# Patient Record
Sex: MALE | Race: WHITE | NOT HISPANIC OR LATINO
[De-identification: names, ages, dates, MRNs, and addresses within clinical notes are randomized per-mention and may not be internally consistent; named-entity substitution may affect disease eponyms.]

---

## 2017-10-09 ENCOUNTER — RESULT REVIEW (OUTPATIENT)
Age: 57
End: 2017-10-09

## 2017-10-18 ENCOUNTER — OUTPATIENT (OUTPATIENT)
Dept: OUTPATIENT SERVICES | Facility: HOSPITAL | Age: 57
LOS: 1 days | End: 2017-10-18
Payer: COMMERCIAL

## 2017-10-18 ENCOUNTER — APPOINTMENT (OUTPATIENT)
Dept: MRI IMAGING | Facility: CLINIC | Age: 57
End: 2017-10-18

## 2017-10-18 DIAGNOSIS — Z00.8 ENCOUNTER FOR OTHER GENERAL EXAMINATION: ICD-10-CM

## 2017-10-18 PROCEDURE — 72148 MRI LUMBAR SPINE W/O DYE: CPT | Mod: 26

## 2017-10-18 PROCEDURE — 72148 MRI LUMBAR SPINE W/O DYE: CPT

## 2018-04-12 ENCOUNTER — OUTPATIENT (OUTPATIENT)
Dept: OUTPATIENT SERVICES | Facility: HOSPITAL | Age: 58
LOS: 1 days | End: 2018-04-12
Payer: COMMERCIAL

## 2018-04-12 ENCOUNTER — APPOINTMENT (OUTPATIENT)
Dept: MRI IMAGING | Facility: CLINIC | Age: 58
End: 2018-04-12

## 2018-04-12 DIAGNOSIS — Z00.8 ENCOUNTER FOR OTHER GENERAL EXAMINATION: ICD-10-CM

## 2018-04-12 PROCEDURE — 73721 MRI JNT OF LWR EXTRE W/O DYE: CPT | Mod: 26,RT

## 2018-04-12 PROCEDURE — 73721 MRI JNT OF LWR EXTRE W/O DYE: CPT

## 2018-05-22 ENCOUNTER — OUTPATIENT (OUTPATIENT)
Dept: OUTPATIENT SERVICES | Facility: HOSPITAL | Age: 58
LOS: 1 days | End: 2018-05-22
Payer: COMMERCIAL

## 2018-05-22 VITALS
HEIGHT: 67 IN | WEIGHT: 173.94 LBS | SYSTOLIC BLOOD PRESSURE: 105 MMHG | HEART RATE: 65 BPM | RESPIRATION RATE: 16 BRPM | TEMPERATURE: 97 F | DIASTOLIC BLOOD PRESSURE: 74 MMHG

## 2018-05-22 DIAGNOSIS — S83.241A OTHER TEAR OF MEDIAL MENISCUS, CURRENT INJURY, RIGHT KNEE, INITIAL ENCOUNTER: ICD-10-CM

## 2018-05-22 DIAGNOSIS — Z01.818 ENCOUNTER FOR OTHER PREPROCEDURAL EXAMINATION: ICD-10-CM

## 2018-05-22 DIAGNOSIS — Z98.890 OTHER SPECIFIED POSTPROCEDURAL STATES: Chronic | ICD-10-CM

## 2018-05-22 LAB
ANION GAP SERPL CALC-SCNC: 8 MMOL/L — SIGNIFICANT CHANGE UP (ref 5–17)
BUN SERPL-MCNC: 22 MG/DL — SIGNIFICANT CHANGE UP (ref 7–23)
CALCIUM SERPL-MCNC: 9.1 MG/DL — SIGNIFICANT CHANGE UP (ref 8.5–10.1)
CHLORIDE SERPL-SCNC: 106 MMOL/L — SIGNIFICANT CHANGE UP (ref 96–108)
CO2 SERPL-SCNC: 27 MMOL/L — SIGNIFICANT CHANGE UP (ref 22–31)
CREAT SERPL-MCNC: 1 MG/DL — SIGNIFICANT CHANGE UP (ref 0.5–1.3)
GLUCOSE SERPL-MCNC: 81 MG/DL — SIGNIFICANT CHANGE UP (ref 70–99)
HCT VFR BLD CALC: 45 % — SIGNIFICANT CHANGE UP (ref 39–50)
HGB BLD-MCNC: 15 G/DL — SIGNIFICANT CHANGE UP (ref 13–17)
MCHC RBC-ENTMCNC: 31.6 PG — SIGNIFICANT CHANGE UP (ref 27–34)
MCHC RBC-ENTMCNC: 33.2 GM/DL — SIGNIFICANT CHANGE UP (ref 32–36)
MCV RBC AUTO: 95.1 FL — SIGNIFICANT CHANGE UP (ref 80–100)
PLATELET # BLD AUTO: 271 K/UL — SIGNIFICANT CHANGE UP (ref 150–400)
POTASSIUM SERPL-MCNC: 4.1 MMOL/L — SIGNIFICANT CHANGE UP (ref 3.5–5.3)
POTASSIUM SERPL-SCNC: 4.1 MMOL/L — SIGNIFICANT CHANGE UP (ref 3.5–5.3)
RBC # BLD: 4.74 M/UL — SIGNIFICANT CHANGE UP (ref 4.2–5.8)
RBC # FLD: 11 % — SIGNIFICANT CHANGE UP (ref 10.3–14.5)
SODIUM SERPL-SCNC: 141 MMOL/L — SIGNIFICANT CHANGE UP (ref 135–145)
WBC # BLD: 6.7 K/UL — SIGNIFICANT CHANGE UP (ref 3.8–10.5)
WBC # FLD AUTO: 6.7 K/UL — SIGNIFICANT CHANGE UP (ref 3.8–10.5)

## 2018-05-22 PROCEDURE — 93005 ELECTROCARDIOGRAM TRACING: CPT

## 2018-05-22 PROCEDURE — G0463: CPT

## 2018-05-22 PROCEDURE — 85027 COMPLETE CBC AUTOMATED: CPT

## 2018-05-22 PROCEDURE — 93010 ELECTROCARDIOGRAM REPORT: CPT | Mod: NC

## 2018-05-22 PROCEDURE — 80048 BASIC METABOLIC PNL TOTAL CA: CPT

## 2018-05-22 NOTE — H&P PST ADULT - PMH
Chronic pain of right knee    Gastroesophageal reflux disease without esophagitis    Other tear of medial meniscus, current injury, right knee, initial encounter    Seasonal allergic rhinitis, unspecified trigger

## 2018-05-22 NOTE — H&P PST ADULT - NSANTHOSAYNRD_GEN_A_CORE
No. PIO screening performed.  STOP BANG Legend: 0-2 = LOW Risk; 3-4 = INTERMEDIATE Risk; 5-8 = HIGH Risk

## 2018-05-22 NOTE — H&P PST ADULT - PROBLEM SELECTOR PLAN 2
Labs - CBC, BMP and EKG  MC with Dr. Fuchs, as requested by Dr. Tamayo  Pre op and Hibiclens instructions reviewed and given. Take routine am meds DOS with sip of water. Avoid NSAIDs and OTC supplements. Verbalized understanding

## 2018-05-22 NOTE — H&P PST ADULT - HISTORY OF PRESENT ILLNESS
59 yo male presents to [facility] scheduled for right knee arthroscopy - partial medial meniscectomy - chondroplasty on 5/30 with Dr. Tamayo. Reports a 2 year history of right knee pain that has increased. MRI show a medial meniscus tear. Denies pain and swelling today.

## 2018-05-22 NOTE — H&P PST ADULT - PROBLEM SELECTOR PLAN 1
scheduled for right knee arthroscopy - partial medial meniscectomy - chondroplasty on 5/30 with Dr. Tamayo

## 2018-05-22 NOTE — H&P PST ADULT - ASSESSMENT
59 yo make with a right knee medial meniscus tear scheduled for right knee arthroscopy - partial medial meniscectomy - chondroplasty on 5/30 with Dr. Tamayo

## 2018-05-29 ENCOUNTER — TRANSCRIPTION ENCOUNTER (OUTPATIENT)
Age: 58
End: 2018-05-29

## 2018-05-29 RX ORDER — SODIUM CHLORIDE 9 MG/ML
1000 INJECTION, SOLUTION INTRAVENOUS
Qty: 0 | Refills: 0 | Status: DISCONTINUED | OUTPATIENT
Start: 2018-05-30 | End: 2018-05-30

## 2018-05-30 ENCOUNTER — RESULT REVIEW (OUTPATIENT)
Age: 58
End: 2018-05-30

## 2018-05-30 ENCOUNTER — OUTPATIENT (OUTPATIENT)
Dept: OUTPATIENT SERVICES | Facility: HOSPITAL | Age: 58
LOS: 1 days | End: 2018-05-30
Payer: COMMERCIAL

## 2018-05-30 VITALS
SYSTOLIC BLOOD PRESSURE: 126 MMHG | RESPIRATION RATE: 14 BRPM | DIASTOLIC BLOOD PRESSURE: 85 MMHG | TEMPERATURE: 98 F | HEART RATE: 66 BPM | OXYGEN SATURATION: 93 % | HEIGHT: 67 IN | WEIGHT: 173.94 LBS

## 2018-05-30 VITALS
TEMPERATURE: 97 F | HEART RATE: 64 BPM | DIASTOLIC BLOOD PRESSURE: 82 MMHG | OXYGEN SATURATION: 97 % | SYSTOLIC BLOOD PRESSURE: 122 MMHG | RESPIRATION RATE: 13 BRPM

## 2018-05-30 DIAGNOSIS — Z01.818 ENCOUNTER FOR OTHER PREPROCEDURAL EXAMINATION: ICD-10-CM

## 2018-05-30 DIAGNOSIS — S83.241A OTHER TEAR OF MEDIAL MENISCUS, CURRENT INJURY, RIGHT KNEE, INITIAL ENCOUNTER: ICD-10-CM

## 2018-05-30 DIAGNOSIS — Z98.890 OTHER SPECIFIED POSTPROCEDURAL STATES: Chronic | ICD-10-CM

## 2018-05-30 PROCEDURE — 88304 TISSUE EXAM BY PATHOLOGIST: CPT | Mod: 26

## 2018-05-30 PROCEDURE — 29881 ARTHRS KNE SRG MNISECTMY M/L: CPT | Mod: RT

## 2018-05-30 PROCEDURE — 88304 TISSUE EXAM BY PATHOLOGIST: CPT

## 2018-05-30 RX ORDER — ASPIRIN/CALCIUM CARB/MAGNESIUM 324 MG
1 TABLET ORAL
Qty: 28 | Refills: 0 | OUTPATIENT
Start: 2018-05-30 | End: 2018-06-12

## 2018-05-30 RX ORDER — SODIUM CHLORIDE 9 MG/ML
1000 INJECTION, SOLUTION INTRAVENOUS
Qty: 0 | Refills: 0 | Status: DISCONTINUED | OUTPATIENT
Start: 2018-05-30 | End: 2018-05-30

## 2018-05-30 RX ORDER — CEFAZOLIN SODIUM 1 G
2000 VIAL (EA) INJECTION ONCE
Qty: 0 | Refills: 0 | Status: COMPLETED | OUTPATIENT
Start: 2018-05-30 | End: 2018-05-30

## 2018-05-30 RX ORDER — ASPIRIN/CALCIUM CARB/MAGNESIUM 324 MG
1 TABLET ORAL
Qty: 0 | Refills: 0 | DISCHARGE
Start: 2018-05-30 | End: 2018-06-12

## 2018-05-30 RX ORDER — HYDROMORPHONE HYDROCHLORIDE 2 MG/ML
0.5 INJECTION INTRAMUSCULAR; INTRAVENOUS; SUBCUTANEOUS
Qty: 0 | Refills: 0 | Status: DISCONTINUED | OUTPATIENT
Start: 2018-05-30 | End: 2018-05-30

## 2018-05-30 RX ORDER — ONDANSETRON 8 MG/1
4 TABLET, FILM COATED ORAL ONCE
Qty: 0 | Refills: 0 | Status: DISCONTINUED | OUTPATIENT
Start: 2018-05-30 | End: 2018-05-30

## 2018-05-30 RX ORDER — ASPIRIN/CALCIUM CARB/MAGNESIUM 324 MG
1 TABLET ORAL
Qty: 28 | Refills: 0
Start: 2018-05-30 | End: 2018-06-12

## 2018-05-30 RX ADMIN — SODIUM CHLORIDE 50 MILLILITER(S): 9 INJECTION, SOLUTION INTRAVENOUS at 06:32

## 2018-05-30 RX ADMIN — SODIUM CHLORIDE 100 MILLILITER(S): 9 INJECTION, SOLUTION INTRAVENOUS at 09:11

## 2018-05-30 NOTE — ASU DISCHARGE PLAN (ADULT/PEDIATRIC). - MEDICATION SUMMARY - MEDICATIONS TO TAKE
I will START or STAY ON the medications listed below when I get home from the hospital:    aspirin 81 mg oral tablet  -- 1 tab(s) by mouth 2 times a day   -- Take with food or milk.    -- Indication: For blood thinner    Percocet 5/325 oral tablet  -- 1 tab(s) by mouth every 6 hours, As Needed -for severe pain MDD:4   -- Caution federal law prohibits the transfer of this drug to any person other  than the person for whom it was prescribed.  May cause drowsiness.  Alcohol may intensify this effect.  Use care when operating dangerous machinery.  This prescription cannot be refilled.  This product contains acetaminophen.  Do not use  with any other product containing acetaminophen to prevent possible liver damage.  Using more of this medication than prescribed may cause serious breathing problems.    -- Indication: For pain medication    Claritin 5 mg oral tablet, chewable  -- 1 tab(s) by mouth 2 times a day, As Needed  -- Indication: For home medication    omeprazole 20 mg oral delayed release capsule  -- 1 cap(s) by mouth once a day  -- Indication: For home medication    Clarinex-D 12 Hour oral tablet, extended release  -- 1 tab(s) by mouth every 12 hours, As Needed  -- Indication: For home medication

## 2018-05-30 NOTE — ASU DISCHARGE PLAN (ADULT/PEDIATRIC). - ACTIVITY LEVEL
no heavy lifting/weight bearing as tolerated/no sports/gym/weight bearing as tolerated for right leg/no exercise/elevate extremity

## 2018-05-30 NOTE — BRIEF OPERATIVE NOTE - PROCEDURE
<<-----Click on this checkbox to enter Procedure Partial medial meniscectomy of right knee  05/30/2018    Active  BEN

## 2018-05-30 NOTE — ASU DISCHARGE PLAN (ADULT/PEDIATRIC). - NOTIFY
Numbness, color, or temperature change to extremity/Fever greater than 101/Bleeding that does not stop/Unable to Urinate/Pain not relieved by Medications/Swelling that continues

## 2018-07-07 ENCOUNTER — TRANSCRIPTION ENCOUNTER (OUTPATIENT)
Age: 58
End: 2018-07-07

## 2019-01-15 PROBLEM — K21.9 GASTRO-ESOPHAGEAL REFLUX DISEASE WITHOUT ESOPHAGITIS: Chronic | Status: ACTIVE | Noted: 2018-05-22

## 2019-01-15 PROBLEM — M25.561 PAIN IN RIGHT KNEE: Chronic | Status: ACTIVE | Noted: 2018-05-22

## 2019-01-15 PROBLEM — J30.2 OTHER SEASONAL ALLERGIC RHINITIS: Chronic | Status: ACTIVE | Noted: 2018-05-22

## 2019-01-15 PROBLEM — S83.241A OTHER TEAR OF MEDIAL MENISCUS, CURRENT INJURY, RIGHT KNEE, INITIAL ENCOUNTER: Chronic | Status: ACTIVE | Noted: 2018-05-22

## 2019-02-01 ENCOUNTER — OUTPATIENT (OUTPATIENT)
Dept: OUTPATIENT SERVICES | Facility: HOSPITAL | Age: 59
LOS: 1 days | End: 2019-02-01
Payer: COMMERCIAL

## 2019-02-01 ENCOUNTER — APPOINTMENT (OUTPATIENT)
Dept: MRI IMAGING | Facility: CLINIC | Age: 59
End: 2019-02-01
Payer: COMMERCIAL

## 2019-02-01 DIAGNOSIS — Z00.8 ENCOUNTER FOR OTHER GENERAL EXAMINATION: ICD-10-CM

## 2019-02-01 DIAGNOSIS — Z98.890 OTHER SPECIFIED POSTPROCEDURAL STATES: Chronic | ICD-10-CM

## 2019-02-01 PROCEDURE — 73221 MRI JOINT UPR EXTREM W/O DYE: CPT | Mod: 26,RT

## 2019-02-01 PROCEDURE — 73221 MRI JOINT UPR EXTREM W/O DYE: CPT

## 2019-11-22 ENCOUNTER — OUTPATIENT (OUTPATIENT)
Dept: OUTPATIENT SERVICES | Facility: HOSPITAL | Age: 59
LOS: 1 days | End: 2019-11-22
Payer: COMMERCIAL

## 2019-11-22 ENCOUNTER — APPOINTMENT (OUTPATIENT)
Dept: MRI IMAGING | Facility: CLINIC | Age: 59
End: 2019-11-22
Payer: COMMERCIAL

## 2019-11-22 DIAGNOSIS — Z98.890 OTHER SPECIFIED POSTPROCEDURAL STATES: Chronic | ICD-10-CM

## 2019-11-22 DIAGNOSIS — Z00.8 ENCOUNTER FOR OTHER GENERAL EXAMINATION: ICD-10-CM

## 2019-11-22 PROCEDURE — 73221 MRI JOINT UPR EXTREM W/O DYE: CPT

## 2019-11-22 PROCEDURE — 73221 MRI JOINT UPR EXTREM W/O DYE: CPT | Mod: 26,RT

## 2020-07-09 ENCOUNTER — TRANSCRIPTION ENCOUNTER (OUTPATIENT)
Age: 60
End: 2020-07-09

## 2020-07-21 ENCOUNTER — APPOINTMENT (OUTPATIENT)
Dept: PULMONOLOGY | Facility: CLINIC | Age: 60
End: 2020-07-21
Payer: COMMERCIAL

## 2020-07-21 VITALS
HEART RATE: 81 BPM | SYSTOLIC BLOOD PRESSURE: 133 MMHG | OXYGEN SATURATION: 91 % | DIASTOLIC BLOOD PRESSURE: 91 MMHG | TEMPERATURE: 94.8 F

## 2020-07-21 VITALS — OXYGEN SATURATION: 94 %

## 2020-07-21 LAB — POCT - HEMOGLOBIN (HGB), QUANTITATIVE, TRANSCUTANEOUS: 14.2

## 2020-07-21 PROCEDURE — 95012 NITRIC OXIDE EXP GAS DETER: CPT

## 2020-07-21 PROCEDURE — 99204 OFFICE O/P NEW MOD 45 MIN: CPT | Mod: 25

## 2020-07-21 PROCEDURE — 94060 EVALUATION OF WHEEZING: CPT

## 2020-07-21 PROCEDURE — 71046 X-RAY EXAM CHEST 2 VIEWS: CPT

## 2020-07-21 PROCEDURE — 94729 DIFFUSING CAPACITY: CPT

## 2020-07-21 PROCEDURE — 94727 GAS DIL/WSHOT DETER LNG VOL: CPT

## 2020-07-21 PROCEDURE — ZZZZZ: CPT

## 2020-07-21 PROCEDURE — 88738 HGB QUANT TRANSCUTANEOUS: CPT

## 2020-07-21 NOTE — ASSESSMENT
[FreeTextEntry1] : prednisone taper\par albuterol HFA\par symbicort BID\par repeat jeromy in 1 month\par f/u rvp\par PPI\par allegra\par flonase \par \par

## 2020-07-21 NOTE — PROCEDURE
[FreeTextEntry1] : PA and lateral chest xray performed using standard projections. Bones and soft tissues structures are unremarkable.Cardiac silhouette appears normal. Lung fields are clear. No infiltrate or masses noted. Mediastinal contours are normal.\par IMPRESSION: no acute cardiopulmonary disease\par \par \par pfts-mild obstructive disease with bronchodiator response at midflows\par increase volume sugestive of air trapping \par \par \par covid swab negative\par he had routine labs drawn today\par \par rvp today

## 2020-07-21 NOTE — HISTORY OF PRESENT ILLNESS
[Never] : never [TextBox_4] : CHRIST WHITE is a 60 year old male who presents with cough/ wheezing X 3- 4weeks\par he has seasonal allergies & post nasal drip\par noticed himself wheezing at night- progressed to throughout the day\par + seasonal allergies\par + post nasal drip\par no new pets/ allergens\par no GI symptoms\par \par \par saw hi PCP- Dr. Kincaid\par started on albuterol HFA- some improvement in symptoms\par no sick contact, no previous pfts\par never seen pulm\par covid swab negative\par covid antibody negative\par \par never smoker\par nonsmoker\par works in data collection\par  \par

## 2020-07-21 NOTE — PHYSICAL EXAM
[No Acute Distress] : no acute distress [No Deformities] : no deformities [IV] : Mallampati Class: IV [Normal Appearance] : normal appearance [Normal Rate/Rhythm] : normal rate/rhythm [No Murmurs] : no murmurs [Normal S1, S2] : normal s1, s2 [No Acc Muscle Use] : no acc muscle use [No Focal Deficits] : no focal deficits [Oriented x3] : oriented x3 [Normal Affect] : normal affect [TextBox_68] : wheezing b/l   [TextBox_11] : +post nasal drip

## 2020-07-22 LAB — RAPID RVP RESULT: NOT DETECTED

## 2020-08-19 ENCOUNTER — LABORATORY RESULT (OUTPATIENT)
Age: 60
End: 2020-08-19

## 2020-08-19 ENCOUNTER — APPOINTMENT (OUTPATIENT)
Dept: PULMONOLOGY | Facility: CLINIC | Age: 60
End: 2020-08-19
Payer: COMMERCIAL

## 2020-08-19 VITALS
SYSTOLIC BLOOD PRESSURE: 133 MMHG | DIASTOLIC BLOOD PRESSURE: 85 MMHG | TEMPERATURE: 97.9 F | HEART RATE: 82 BPM | RESPIRATION RATE: 16 BRPM | OXYGEN SATURATION: 93 %

## 2020-08-19 PROCEDURE — 99213 OFFICE O/P EST LOW 20 MIN: CPT | Mod: 25

## 2020-08-19 PROCEDURE — 36415 COLL VENOUS BLD VENIPUNCTURE: CPT

## 2020-08-19 NOTE — HISTORY OF PRESENT ILLNESS
[Never] : never [TextBox_4] : CHRISTINE WHITE is a 60 year old male who presents for another exacerbation\par after prednisone taper \par okay for 1 week\par then back with cough, sometimes productive- yellow sputum. chest tight. wheezing\par he is on symbicort , flonase and clairitin\par no fevers, no chills\par no change in exercise tolerance\par \par

## 2020-08-19 NOTE — PHYSICAL EXAM
[No Acute Distress] : no acute distress [No Deformities] : no deformities [No Neck Mass] : no neck mass [III] : Mallampati Class: III [No Murmurs] : no murmurs [Normal Rate/Rhythm] : normal rate/rhythm [Normal S1, S2] : normal s1, s2 [No Focal Deficits] : no focal deficits [Oriented x3] : oriented x3 [Normal Affect] : normal affect [TextBox_68] : wheeze expiratory Left. right lung CTA

## 2020-08-19 NOTE — ASSESSMENT
[FreeTextEntry1] : prednisone taper again\par continue PPI\par continue flonase\par symbicort samples given\par stop claritin\par start singulair\par hold on xray\par hold on abx\par f/u labs\par repeat jeromy soon\par

## 2020-08-21 ENCOUNTER — NON-APPOINTMENT (OUTPATIENT)
Age: 60
End: 2020-08-21

## 2020-09-02 ENCOUNTER — APPOINTMENT (OUTPATIENT)
Dept: PULMONOLOGY | Facility: CLINIC | Age: 60
End: 2020-09-02
Payer: COMMERCIAL

## 2020-09-02 VITALS
DIASTOLIC BLOOD PRESSURE: 82 MMHG | SYSTOLIC BLOOD PRESSURE: 123 MMHG | RESPIRATION RATE: 15 BRPM | HEART RATE: 89 BPM | TEMPERATURE: 97.8 F | OXYGEN SATURATION: 95 %

## 2020-09-02 PROCEDURE — 99213 OFFICE O/P EST LOW 20 MIN: CPT

## 2020-09-02 RX ORDER — FLUTICASONE PROPIONATE 50 UG/1
50 SPRAY, METERED NASAL TWICE DAILY
Qty: 1 | Refills: 3 | Status: DISCONTINUED | COMMUNITY
Start: 2020-07-21 | End: 2020-09-02

## 2020-09-02 NOTE — DISCUSSION/SUMMARY
[FreeTextEntry1] : continue symbicort\par continue singulair\par start atrovent nasal spray\par stop flonase nasal spray\par finished prednisone\par dneies GERD symptoms- can stop PPI\par call me in 2 weeks\par if post nasal drip still an issue- can go to ENT\par \par

## 2020-09-02 NOTE — HISTORY OF PRESENT ILLNESS
[Never] : never [TextBox_4] : CHRISTINE WHITE is a 60 year old male who presents for f/u for cough/ wheezing \par he is finished with prednisone- no longer wheezing\par \par on symbicort, flonase & singulair. no wheezing.\par + post nasal drip still ongoing and it causes a cough\par no change in exercise tolerance\par no fevers\par no GERD\par no sinus congestion/pain\par + history of deviated septum\par

## 2020-09-02 NOTE — PHYSICAL EXAM
[No Acute Distress] : no acute distress [III] : Mallampati Class: III [No Deformities] : no deformities [No Neck Mass] : no neck mass [Normal Rate/Rhythm] : normal rate/rhythm [Normal S1, S2] : normal s1, s2 [No Murmurs] : no murmurs [No Resp Distress] : no resp distress [Clear to Auscultation Bilaterally] : clear to auscultation bilaterally [No Focal Deficits] : no focal deficits [Oriented x3] : oriented x3 [Normal Affect] : normal affect

## 2020-09-08 ENCOUNTER — NON-APPOINTMENT (OUTPATIENT)
Age: 60
End: 2020-09-08

## 2020-09-10 ENCOUNTER — RESULT REVIEW (OUTPATIENT)
Age: 60
End: 2020-09-10

## 2020-09-10 ENCOUNTER — APPOINTMENT (OUTPATIENT)
Dept: CT IMAGING | Facility: IMAGING CENTER | Age: 60
End: 2020-09-10
Payer: COMMERCIAL

## 2020-09-10 ENCOUNTER — OUTPATIENT (OUTPATIENT)
Dept: OUTPATIENT SERVICES | Facility: HOSPITAL | Age: 60
LOS: 1 days | End: 2020-09-10
Payer: COMMERCIAL

## 2020-09-10 DIAGNOSIS — R06.2 WHEEZING: ICD-10-CM

## 2020-09-10 DIAGNOSIS — Z98.890 OTHER SPECIFIED POSTPROCEDURAL STATES: Chronic | ICD-10-CM

## 2020-09-10 DIAGNOSIS — Z00.8 ENCOUNTER FOR OTHER GENERAL EXAMINATION: ICD-10-CM

## 2020-09-10 PROCEDURE — 71250 CT THORAX DX C-: CPT | Mod: 26

## 2020-09-10 PROCEDURE — 71250 CT THORAX DX C-: CPT

## 2020-09-14 ENCOUNTER — APPOINTMENT (OUTPATIENT)
Dept: PULMONOLOGY | Facility: CLINIC | Age: 60
End: 2020-09-14
Payer: COMMERCIAL

## 2020-09-14 ENCOUNTER — LABORATORY RESULT (OUTPATIENT)
Age: 60
End: 2020-09-14

## 2020-09-14 DIAGNOSIS — Z23 ENCOUNTER FOR IMMUNIZATION: ICD-10-CM

## 2020-09-14 PROCEDURE — 36415 COLL VENOUS BLD VENIPUNCTURE: CPT

## 2020-09-14 PROCEDURE — 90686 IIV4 VACC NO PRSV 0.5 ML IM: CPT

## 2020-09-14 PROCEDURE — G0008: CPT

## 2020-09-14 PROCEDURE — 99214 OFFICE O/P EST MOD 30 MIN: CPT | Mod: 25

## 2020-09-14 NOTE — HISTORY OF PRESENT ILLNESS
[Never] : never [TextBox_4] : CHRIST WHITE is a 60 year old male who presents wih wife\par wheezing/ dyspnea again. \par cough +\par on symbicort and started spiriva for past week- no change\par he is off PPI but regardless of whether on/off wheezing is present\par we went over his allergy serum profile\par went over high eosinophils on CBC\par denies any post nasal drip\par CT chest noncontrast 4 mm nodules (2) in nonsmoker\par \par

## 2020-09-14 NOTE — ASSESSMENT
[FreeTextEntry1] : he has been on 2 prednisone tapers- stable for 2 days and then return of cough/ wheeze\par his eosinophils were 12% when off prednisone\par will ask him to see Dr Boxer\par will perform pfts to see if any reversibility\par check FENO\par check cbc again today off prednisone\par may be candidate for injectables\par he wants to try trelgy- sample given- advised to stop symbicort spiriva

## 2020-09-14 NOTE — PROCEDURE
[FreeTextEntry1] : labs today \par rheum panel\par cbc\par covid swab- with pfts with bronchodilator (reminded him to bring in his own albuterol)\par FENO at next vist\par \par flu today\par \par

## 2020-09-14 NOTE — PHYSICAL EXAM
[No Acute Distress] : no acute distress [Well Developed] : well developed [Well Nourished] : well nourished [No Neck Mass] : no neck mass [III] : Mallampati Class: III [Normal Rate/Rhythm] : normal rate/rhythm [No Murmurs] : no murmurs [No Focal Deficits] : no focal deficits [Normal S1, S2] : normal s1, s2 [Oriented x3] : oriented x3 [Normal Affect] : normal affect [TextBox_68] : wheeze b/l

## 2020-09-15 LAB
BASOPHILS # BLD AUTO: 0.08 K/UL
BASOPHILS NFR BLD AUTO: 1 %
CK SERPL-CCNC: 115 U/L
CRP SERPL-MCNC: 0.31 MG/DL
EOSINOPHIL # BLD AUTO: 0.64 K/UL
EOSINOPHIL NFR BLD AUTO: 7.9 %
ERYTHROCYTE [SEDIMENTATION RATE] IN BLOOD BY WESTERGREN METHOD: 25 MM/HR
HCT VFR BLD CALC: 47.6 %
HGB BLD-MCNC: 15.2 G/DL
HIV1+2 AB SPEC QL IA.RAPID: NONREACTIVE
IMM GRANULOCYTES NFR BLD AUTO: 0.4 %
LYMPHOCYTES # BLD AUTO: 1.59 K/UL
LYMPHOCYTES NFR BLD AUTO: 19.7 %
MAN DIFF?: NORMAL
MCHC RBC-ENTMCNC: 31.9 GM/DL
MCHC RBC-ENTMCNC: 32.5 PG
MCV RBC AUTO: 101.9 FL
MONOCYTES # BLD AUTO: 0.71 K/UL
MONOCYTES NFR BLD AUTO: 8.8 %
MPO AB + PR3 PNL SER: NORMAL
NEUTROPHILS # BLD AUTO: 5.04 K/UL
NEUTROPHILS NFR BLD AUTO: 62.2 %
PLATELET # BLD AUTO: 296 K/UL
RBC # BLD: 4.67 M/UL
RBC # FLD: 11.6 %
RHEUMATOID FACT SER QL: <10 IU/ML
SARS-COV-2 N GENE NPH QL NAA+PROBE: NOT DETECTED
WBC # FLD AUTO: 8.09 K/UL

## 2020-09-16 LAB
ACE BLD-CCNC: 24 U/L
ANA SER IF-ACNC: NEGATIVE
ANACR T: NEGATIVE
ENA JO1 AB SER IA-ACNC: <0.2 AL
ENA SCL70 IGG SER IA-ACNC: <0.2 AL

## 2020-09-17 LAB
ALTERN TENCAPG(M6): 2.3 MCG/ML
ASPER FUMCAPG(M3): 53.4 MCG/ML
AUREOBASCAPG(M12): 11.5 MCG/ML
MICROPOLYCAPG(M22): 3.2 MCG/ML
PENIC CHRYCAPG(M1): 49.6 MCG/ML
PHOMA BETAE IGG: 3 MCG/ML
THERMOCAPG(M23): 16.2 MCG/ML
TRICHODERMA VIRIDE IGG: 4.9 MCG/ML

## 2020-09-18 ENCOUNTER — APPOINTMENT (OUTPATIENT)
Dept: PULMONOLOGY | Facility: CLINIC | Age: 60
End: 2020-09-18
Payer: COMMERCIAL

## 2020-09-18 VITALS
SYSTOLIC BLOOD PRESSURE: 124 MMHG | OXYGEN SATURATION: 95 % | HEIGHT: 67 IN | DIASTOLIC BLOOD PRESSURE: 89 MMHG | RESPIRATION RATE: 16 BRPM | HEART RATE: 86 BPM | WEIGHT: 170 LBS | BODY MASS INDEX: 26.68 KG/M2 | TEMPERATURE: 98 F

## 2020-09-18 PROCEDURE — 94750: CPT

## 2020-09-18 PROCEDURE — 94729 DIFFUSING CAPACITY: CPT

## 2020-09-18 PROCEDURE — 99213 OFFICE O/P EST LOW 20 MIN: CPT | Mod: 95

## 2020-09-18 PROCEDURE — 94060 EVALUATION OF WHEEZING: CPT

## 2020-09-18 PROCEDURE — 94726 PLETHYSMOGRAPHY LUNG VOLUMES: CPT

## 2020-09-18 PROCEDURE — 95012 NITRIC OXIDE EXP GAS DETER: CPT

## 2020-09-18 NOTE — PHYSICAL EXAM
[No Acute Distress] : no acute distress [Well Nourished] : well nourished [Well Developed] : well developed [No Resp Distress] : no resp distress [No Acc Muscle Use] : no acc muscle use

## 2020-09-18 NOTE — ASSESSMENT
[FreeTextEntry1] : On symbicort & spiriva- his flow rates are better today than the initial PFT done 7/2020\par the reversibility seen midflows initially is not seen\par based on his high eosinophils when off prednisone, his need for prednisone to help control his symptoms and high feno- i believe injectables will be next step\par \par CT chest unremarkable\par FENO 70 today\par 2 PFTs  july and 9/2020 on file\par reviewed labs\par \par he will f/u with Dr Boxer- and then see me with his decision\par

## 2020-09-18 NOTE — HISTORY OF PRESENT ILLNESS
[TextBox_4] : This visit was provided via telehealth using real-time 2-way audio visual technology. The patient,  CHRISTINE WHITE , was located at home, [address] [place] [address]\[place] [address] at the time of the visit. \par The provider, Jr Linares, was located at office [address] [place] [address] at the time of the visit. \par The patient, Mr. CHRISTINE WHITE and Physician MD Linares, Jr participated in the telehealth encounter. \par Verbal consent obtained by  from patient \par \par CHRISTINE WHITE is a 60 year old male who presents to f/u on his formal pfts\par \par pfts show no reversibility, with fev1 within normal at 86%\par normal volumes & DLCO\par \par FENO 70\par \par he is on symbicort and spiriva\par not on prednisone\par still with cough/ chest tightness\par \par

## 2020-09-24 ENCOUNTER — APPOINTMENT (OUTPATIENT)
Dept: ALLERGY | Facility: CLINIC | Age: 60
End: 2020-09-24
Payer: COMMERCIAL

## 2020-09-24 VITALS
DIASTOLIC BLOOD PRESSURE: 90 MMHG | HEIGHT: 67 IN | HEART RATE: 95 BPM | BODY MASS INDEX: 26.68 KG/M2 | TEMPERATURE: 98.1 F | RESPIRATION RATE: 16 BRPM | SYSTOLIC BLOOD PRESSURE: 124 MMHG | WEIGHT: 170 LBS | OXYGEN SATURATION: 96 %

## 2020-09-24 PROCEDURE — 99204 OFFICE O/P NEW MOD 45 MIN: CPT | Mod: 25

## 2020-09-24 PROCEDURE — 95004 PERQ TESTS W/ALRGNC XTRCS: CPT

## 2020-09-24 PROCEDURE — 95018 ALL TSTG PERQ&IQ DRUGS/BIOL: CPT

## 2020-09-24 NOTE — IMPRESSION
[Allergy Testing Dust Mite] : dust mites [] : molds [Allergy Testing Trees] : trees [Allergy Testing Grasses] : grasses [Allergy Testing Cockroach] : cockroach [Allergy Testing Dog] : dog [Allergy Testing Cat] : cat [Allergy Testing Weeds] : weeds

## 2020-09-24 NOTE — PHYSICAL EXAM
[Alert] : alert [Well Nourished] : well nourished [Healthy Appearance] : healthy appearance [No Acute Distress] : no acute distress [Well Developed] : well developed [Normal Pupil & Iris Size/Symmetry] : normal pupil and iris size and symmetry [Sclera Not Icteric] : sclera not icteric [Normal Nasal Mucosa] : the nasal mucosa was normal [Normal Lips/Tongue] : the lips and tongue were normal [Normal Tonsils] : normal tonsils [Normal Dentition] : normal dentition [No Oral Lesions or Ulcers] : no oral lesions or ulcers [Pale mucosa] : pale mucosa [Boggy Nasal Turbinates] : boggy and/or pale nasal turbinates [Clear Rhinorrhea] : clear rhinorrhea was seen [No Neck Mass] : no neck mass was observed [No LAD] : no lymphadenopathy [No Thyroid Mass] : no thyroid mass [Supple] : the neck was supple [Normal Rate and Effort] : normal respiratory rhythm and effort [No Crackles] : no crackles [No Retractions] : no retractions [Bilateral Audible Breath Sounds] : bilateral audible breath sounds [Normal Rate] : heart rate was normal  [Normal S1, S2] : normal S1 and S2 [No murmur] : no murmur [Regular Rhythm] : with a regular rhythm [Normal Cervical Lymph Nodes] : cervical [Skin Intact] : skin intact  [No Rash] : no rash [No Skin Lesions] : no skin lesions [No Joint Swelling or Erythema] : no joint swelling or erythema [No clubbing] : no clubbing [No Edema] : no edema [No Cyanosis] : no cyanosis [Normal Mood] : mood was normal [Normal Affect] : affect was normal [Alert, Awake, Oriented as Age-Appropriate] : alert, awake, oriented as age appropriate

## 2020-09-25 ENCOUNTER — APPOINTMENT (OUTPATIENT)
Dept: CT IMAGING | Facility: IMAGING CENTER | Age: 60
End: 2020-09-25
Payer: COMMERCIAL

## 2020-09-25 ENCOUNTER — RESULT REVIEW (OUTPATIENT)
Age: 60
End: 2020-09-25

## 2020-09-25 ENCOUNTER — OUTPATIENT (OUTPATIENT)
Dept: OUTPATIENT SERVICES | Facility: HOSPITAL | Age: 60
LOS: 1 days | End: 2020-09-25
Payer: COMMERCIAL

## 2020-09-25 DIAGNOSIS — Z00.8 ENCOUNTER FOR OTHER GENERAL EXAMINATION: ICD-10-CM

## 2020-09-25 DIAGNOSIS — Z98.890 OTHER SPECIFIED POSTPROCEDURAL STATES: Chronic | ICD-10-CM

## 2020-09-25 DIAGNOSIS — R06.2 WHEEZING: ICD-10-CM

## 2020-09-25 PROCEDURE — 70486 CT MAXILLOFACIAL W/O DYE: CPT | Mod: 26

## 2020-09-25 PROCEDURE — 70486 CT MAXILLOFACIAL W/O DYE: CPT

## 2020-10-01 ENCOUNTER — APPOINTMENT (OUTPATIENT)
Dept: ALLERGY | Facility: CLINIC | Age: 60
End: 2020-10-01
Payer: COMMERCIAL

## 2020-10-01 PROCEDURE — 95018 ALL TSTG PERQ&IQ DRUGS/BIOL: CPT

## 2020-10-01 PROCEDURE — 95024 IQ TESTS W/ALLERGENIC XTRCS: CPT

## 2020-10-01 PROCEDURE — 99214 OFFICE O/P EST MOD 30 MIN: CPT | Mod: 25

## 2020-10-01 NOTE — SOCIAL HISTORY
[Spouse/Partner] : spouse/partner [House] : [unfilled] lives in a house  [Radiator/Baseboard] : heating provided by radiator(s)/baseboard(s) [Central] : air conditioning provided by central unit [None] : none [] :  [FreeTextEntry2] :   [Bedroom] : not in the bedroom [Basement] : not in the basement [Living Area] : not in the living area [Smokers in Household] : there are no smokers in the home

## 2020-10-01 NOTE — HISTORY OF PRESENT ILLNESS
[de-identified] : Patient here to complete allergy skin testing and review CT results and plan of treatment

## 2020-10-01 NOTE — ASSESSMENT
[FreeTextEntry1] : Chronic sinusitis resulting in recurrent coughing and wheezing:\par \par Sinus irrigation with budesonide/mupirocin/saline/baby shampoo \par Continue Trelegy\par Continue Singulair\par Doxycycline 100 mg BID x 14 days\par RV rhinoscopy in 2 weeks

## 2020-10-08 ENCOUNTER — APPOINTMENT (OUTPATIENT)
Dept: ALLERGY | Facility: CLINIC | Age: 60
End: 2020-10-08
Payer: COMMERCIAL

## 2020-10-08 PROCEDURE — 99213 OFFICE O/P EST LOW 20 MIN: CPT | Mod: 95

## 2020-10-08 NOTE — PHYSICAL EXAM
[Alert] : alert [Healthy Appearance] : healthy appearance [No Acute Distress] : no acute distress [Well Developed] : well developed [Normal Voice/Communication] : normal voice communication [Wheezing] : no wheezing was heard [Normal Mood] : mood was normal [Normal Affect] : affect was normal [Judgment and Insight Age Appropriate] : judgement and insight is age appropriate [Alert, Awake, Oriented as Age-Appropriate] : alert, awake, oriented as age appropriate [de-identified] : no audible wheeze

## 2020-10-08 NOTE — HISTORY OF PRESENT ILLNESS
[Home] : at home, [unfilled] , at the time of the visit. [Medical Office: ([facility])___] : at the medical office located in  [Verbal consent obtained from patient] : the patient, [unfilled] [de-identified] : Patient reports persistent wheeze despite using medications as prescribed.

## 2020-10-08 NOTE — ASSESSMENT
[FreeTextEntry1] : Chronic sinusitis:\par \par Prednisone 30 mg QD x 3 D\par Prednisone 20 mg QD x 3 D\par Prednisone 10 mg QD x 3 D\par Continue with sinus rinse\par \par This visit was provided via telehealth using real time 2-way audio visual technology.  The patient,  Oleksandr Ordoñez, was located at home, at the time of the visit.   The provider, Mitchell Boxer, M.D., was located at the office, [address] [place] [address], at the time of the visit.\par \par The patient, Oleksandr Ordoñez and physician, Mitchell Boxer, M.D., participated in the telehealth encounter.\par \par Verbal consent obtained by  from patient.\par \par The majority of time (>50%) was spent on counseling and coordination of care with this patient and/or family member.  The approximate physician face to face time was 15 minutes for the diagnosis of chronic sinusitis. \par

## 2020-10-14 ENCOUNTER — APPOINTMENT (OUTPATIENT)
Dept: ALLERGY | Facility: CLINIC | Age: 60
End: 2020-10-14

## 2020-11-02 ENCOUNTER — NON-APPOINTMENT (OUTPATIENT)
Age: 60
End: 2020-11-02

## 2020-11-09 ENCOUNTER — APPOINTMENT (OUTPATIENT)
Dept: ALLERGY | Facility: CLINIC | Age: 60
End: 2020-11-09
Payer: COMMERCIAL

## 2020-11-09 PROCEDURE — 31231 NASAL ENDOSCOPY DX: CPT

## 2020-11-09 PROCEDURE — 99072 ADDL SUPL MATRL&STAF TM PHE: CPT

## 2020-11-09 PROCEDURE — 99214 OFFICE O/P EST MOD 30 MIN: CPT | Mod: 25

## 2020-11-09 RX ORDER — CEFDINIR 300 MG/1
300 CAPSULE ORAL
Qty: 42 | Refills: 0 | Status: DISCONTINUED | COMMUNITY
Start: 2020-10-13

## 2020-11-09 RX ORDER — SUCRALFATE 1 G/10ML
1 SUSPENSION ORAL
Qty: 300 | Refills: 0 | Status: DISCONTINUED | COMMUNITY
Start: 2020-10-13

## 2020-11-09 RX ORDER — TACROLIMUS 1 MG/G
0.1 OINTMENT TOPICAL
Qty: 60 | Refills: 0 | Status: DISCONTINUED | COMMUNITY
Start: 2020-06-11

## 2020-11-09 RX ORDER — BUDESONIDE AND FORMOTEROL FUMARATE DIHYDRATE 160; 4.5 UG/1; UG/1
160-4.5 AEROSOL RESPIRATORY (INHALATION) TWICE DAILY
Qty: 1 | Refills: 0 | Status: DISCONTINUED | COMMUNITY
Start: 2020-07-21 | End: 2020-11-09

## 2020-11-09 NOTE — HISTORY OF PRESENT ILLNESS
[de-identified] : Patient not doing well - persistent coughing - prednisone seemed to clear his symptoms temporarily.  He saw ENT in [place] - 3 weeks of cefdinir - no change.

## 2020-11-09 NOTE — PHYSICAL EXAM
[Alert] : alert [Well Nourished] : well nourished [Healthy Appearance] : healthy appearance [No Acute Distress] : no acute distress [Well Developed] : well developed [Normal Pupil & Iris Size/Symmetry] : normal pupil and iris size and symmetry [Sclera Not Icteric] : sclera not icteric [Normal Nasal Mucosa] : the nasal mucosa was normal [Normal Lips/Tongue] : the lips and tongue were normal [Normal Tonsils] : normal tonsils [Normal Dentition] : normal dentition [No Oral Lesions or Ulcers] : no oral lesions or ulcers [Pale mucosa] : pale mucosa [Boggy Nasal Turbinates] : boggy and/or pale nasal turbinates [Clear Rhinorrhea] : clear rhinorrhea was seen [No Neck Mass] : no neck mass was observed [No LAD] : no lymphadenopathy [No Thyroid Mass] : no thyroid mass [Supple] : the neck was supple [Normal Rate and Effort] : normal respiratory rhythm and effort [No Crackles] : no crackles [No Retractions] : no retractions [Bilateral Audible Breath Sounds] : bilateral audible breath sounds [Wheezing] : wheezing was heard [Normal Rate] : heart rate was normal  [Normal S1, S2] : normal S1 and S2 [No murmur] : no murmur [Regular Rhythm] : with a regular rhythm [Normal Cervical Lymph Nodes] : cervical [Skin Intact] : skin intact  [No Rash] : no rash [No Skin Lesions] : no skin lesions [No Joint Swelling or Erythema] : no joint swelling or erythema [No clubbing] : no clubbing [No Edema] : no edema [No Cyanosis] : no cyanosis [Normal Mood] : mood was normal [Normal Affect] : affect was normal [Alert, Awake, Oriented as Age-Appropriate] : alert, awake, oriented as age appropriate [de-identified] : see rhinoscopy

## 2020-11-09 NOTE — IMPRESSION
[FreeTextEntry2] : septal spur on right \par post nasal drip along TT bilaterally\par increased posterior cobblestone

## 2020-11-09 NOTE — ASSESSMENT
[FreeTextEntry1] : Chronic and acute sinusitis with coughing:\par \par Prednisone 30 mg QD x 5 D\par Prednisone 20 mg QD x 5 D\par Prednisone 10 mg QD x 5 D\par Restart Sinus Rinse with budesonide/mupirocin\par Repeat sinus CT \par \par Moderate persistent asthma:\par \par Restart Trelegy QD\par Albuterol nebulizer QID prn \par \par Refer to ENT for evaluation

## 2020-11-09 NOTE — REASON FOR VISIT
[Routine Follow-Up] : a routine follow-up visit for [FreeTextEntry3] : follow up of sinusitis and coughing.

## 2020-11-09 NOTE — SOCIAL HISTORY
[Spouse/Partner] : spouse/partner [FreeTextEntry2] :   [House] : [unfilled] lives in a house  [Radiator/Baseboard] : heating provided by radiator(s)/baseboard(s) [Central] : air conditioning provided by central unit [Bedroom] : not in the bedroom [Basement] : not in the basement [Living Area] : not in the living area [None] : none [] :  [Smokers in Household] : there are no smokers in the home

## 2020-11-10 ENCOUNTER — TRANSCRIPTION ENCOUNTER (OUTPATIENT)
Age: 60
End: 2020-11-10

## 2020-11-13 ENCOUNTER — APPOINTMENT (OUTPATIENT)
Dept: CT IMAGING | Facility: IMAGING CENTER | Age: 60
End: 2020-11-13
Payer: COMMERCIAL

## 2020-11-13 ENCOUNTER — RESULT REVIEW (OUTPATIENT)
Age: 60
End: 2020-11-13

## 2020-11-13 ENCOUNTER — OUTPATIENT (OUTPATIENT)
Dept: OUTPATIENT SERVICES | Facility: HOSPITAL | Age: 60
LOS: 1 days | End: 2020-11-13
Payer: COMMERCIAL

## 2020-11-13 DIAGNOSIS — Z00.8 ENCOUNTER FOR OTHER GENERAL EXAMINATION: ICD-10-CM

## 2020-11-13 DIAGNOSIS — Z98.890 OTHER SPECIFIED POSTPROCEDURAL STATES: Chronic | ICD-10-CM

## 2020-11-13 DIAGNOSIS — R06.2 WHEEZING: ICD-10-CM

## 2020-11-13 PROCEDURE — 70486 CT MAXILLOFACIAL W/O DYE: CPT

## 2020-11-13 PROCEDURE — 70486 CT MAXILLOFACIAL W/O DYE: CPT | Mod: 26

## 2020-11-17 NOTE — HISTORY OF PRESENT ILLNESS
[Eczematous rashes] : eczematous rashes [Venom Reactions] : venom reactions [Food Allergies] : food allergies [de-identified] : Patient born in [place] and living [place].   Patient with history of seasonal allergies x many years - mid April thru May - and remainder of the year no nasal allergies.   About 4 months months ago he developed wheezing and coughing - continuous symptoms - he saw his PCP and treated with albuterol.   He then consulted with pulmonary and treated with prednisone with resolution of his symptoms.  Off prednisone he had recurrent symptoms.   He most recently was treated with Trelegy for the past week - no improvement with the inhalers.   Patient feels intermittent post nasal drip - every morning he has to cough and the mucus is thick - the mucus is between green and yellow and very thick.   In the AM he has nasal congestion and AM mucus that is green in color   Taste and smell are normal.  \par \par Occasional GERD symptoms - he has been treated with omeprazole - he stopped recently with no change in his coughing when taking his medications.   \par \par In July he was in [place] and no change in his coughing. \par \par Patient lives in [facility] - no flooding inside his house.   He works in his garage a lot.   No musty smell in his home.   He works at desk and computer. 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

## 2020-11-17 NOTE — ASSESSMENT
[FreeTextEntry1] : Chronic cough/wheeze secondary to chronic sinusitis. \par \par SInus CT\par Continue Trelegy\par Continue Singulair\par RV environmental ID skin testing\par \par Addendum:  Sinus CT confirms chronic sinusitis with air fluid levels.   Mr. Ordoñez has subsequently been treated with Doxycycline BID x 2 weeks - nasal irrigation with budesonide/mupirocin/saline/babyshampoo.   He will return for re-evaluation in 1 week.  \par \par

## 2020-11-17 NOTE — CONSULT LETTER
[Dear  ___] : Dear  [unfilled], [Thank you for referring [unfilled] for consultation for _____] : Thank you for referring [unfilled] for consultation for [unfilled] [Please see my note below.] : Please see my note below. [Sincerely,] : Sincerely, [FreeTextEntry3] : Mitchell B. Boxer, M.D., FAAAAI\par [facility]\par \par Department of Allergy-Immunology\par [facility] \par [address] [place] [address]\par [place] [address]\par Tel:   [phone]\par Fax:  [phone]\par Email: [email or web address]\par

## 2020-11-18 ENCOUNTER — APPOINTMENT (OUTPATIENT)
Dept: OTOLARYNGOLOGY | Facility: CLINIC | Age: 60
End: 2020-11-18
Payer: COMMERCIAL

## 2020-11-18 VITALS
WEIGHT: 165 LBS | TEMPERATURE: 96.9 F | HEIGHT: 67 IN | HEART RATE: 104 BPM | BODY MASS INDEX: 25.9 KG/M2 | DIASTOLIC BLOOD PRESSURE: 93 MMHG | SYSTOLIC BLOOD PRESSURE: 127 MMHG

## 2020-11-18 PROCEDURE — 31231 NASAL ENDOSCOPY DX: CPT

## 2020-11-18 PROCEDURE — 99203 OFFICE O/P NEW LOW 30 MIN: CPT | Mod: 25

## 2020-11-18 NOTE — HISTORY OF PRESENT ILLNESS
[de-identified] : 60M with CRS presents for evaluation\par Referred by Dr. Mitchell Boxer (Allergy)\par Mild longstanding sinus symptoms but then 5mo ago began to experience persistent PND which has contributed to worsening wheezing, frequent throat clearing\par Prev saw pulmonology-- no improvement with inhalers\par Also saw OSH ENT who started cefdinir for 3wk without improvement\par Then saw Boxer who referred for CT in 9/2020-- demonstrated pansinus disease\par Since then has been on several rounds of PO steroids with improvement but symptoms rapidly return when off\par Presently on steroid taper\par Had repeat CT several days ago while on steroids-- demonstrates improvement as compared to prior but still with pansinus disease-- predominately ethmoid disease, R DNS\par At this point symptoms are causing a significant QOL burden\par No improvement w INCS\par \par No other PMH

## 2020-11-18 NOTE — REVIEW OF SYSTEMS
[Seasonal Allergies] : seasonal allergies [Post Nasal Drip] : post nasal drip [Nasal Congestion] : nasal congestion [Recurrent Sinus Infections] : recurrent sinus infections [Discolored Nasal Discharge] : discolored nasal discharge [Throat Clearing] : throat clearing [Wheezing] : wheezing [Cough] : cough [Negative] : Heme/Lymph

## 2020-11-18 NOTE — DATA REVIEWED
[de-identified] : \par EXAM: CT SINUSES\par \par \par PROCEDURE DATE: 11/13/2020\par \par \par \par INTERPRETATION: INDICATION: Wheezing and coughing with recurrent sinusitis.\par \par TECHNIQUE: Thin section axial CT imaging of the sinuses was performed. Sagittal and coronal reformations were generated from the thin section axial data. The study was performed with [employer] stereotactic protocol.\par \par COMPARISON EXAMINATION: 9/25/2020\par \par FINDINGS:\par FRONTAL SINUSES: Hypoplastic on the left. Mild mucosal thickening bilaterally which has decreased on the right.\par ETHMOID SINUSES: Moderate to marked mucosal thickening which has mildly improved\par MAXILLARY SINUSES: Mild mucosal thickening which has improved\par SPHENOID SINUSES: Mild mucosal thickening which has significantly improved. Previously seen foamy secretions on the left have resolved.\par NASAL SEPTUM: Deviated to the right with a septal spur narrowing the right nasal cavity\par NASAL CAVITY/NASOPHARYNX: No polypoid mass. Mildly improved aeration of the upper nasal cavities.\par POSTOP CHANGES: None seen.\par RIGHT OSTIOMEATAL UNIT: A lateralized middle turbinate narrows the middle meatus\par LEFT OSTIOMEATAL UNIT: Mucosal disease obstructs the infundibulum. A lateralized middle turbinate narrows the meatus.\par SPHENOETHMOIDAL RECESSES: Obstructed on the right and narrowed on the left by mucosal disease\par RIGHT FRONTAL RECESS: Narrowed by a large agger nasi cell and obstructed by mucosal disease\par LEFT FRONTAL RECESS: Obstructed by mucosal disease\par BONES: The bones surrounding the paranasal sinuses are intact.\par VISUALIZED INTRACRANIAL STRUCTURES: Normal.\par ORBITAL CONTENTS: Normal.\par MISCELLANEOUS: None.\par \par IMPRESSION: Chronic pansinusitis with overall improved aeration of the sinuses when compared with the prior exam.\par \par Nasal septal deviation to the right with narrowing of the right nasal cavity.\par \par Narrowing/obstruction of sinus drainage pathways.\par \par \par \par \par \par \par \par BETHANIE ORTIZ MD; Attending Radiologist\par This document has been electronically signed. Nov 13 2020 2:09PM\par

## 2020-11-18 NOTE — REASON FOR VISIT
[Initial Evaluation] : an initial evaluation for [FreeTextEntry2] : c/o frequent coughing and wheezing and nasal congestion times 30 years

## 2020-11-19 ENCOUNTER — APPOINTMENT (OUTPATIENT)
Dept: ALLERGY | Facility: CLINIC | Age: 60
End: 2020-11-19
Payer: COMMERCIAL

## 2020-11-19 PROCEDURE — 99213 OFFICE O/P EST LOW 20 MIN: CPT | Mod: 95

## 2020-11-19 NOTE — REASON FOR VISIT
[Routine Follow-Up] : a routine follow-up visit for [FreeTextEntry3] : recurrent sinusitis - asthma

## 2020-11-19 NOTE — PHYSICAL EXAM
[Alert] : alert [No Acute Distress] : no acute distress [Normal Mood] : mood was normal [Normal Affect] : affect was normal [Judgment and Insight Age Appropriate] : judgement and insight is age appropriate [Alert, Awake, Oriented as Age-Appropriate] : alert, awake, oriented as age appropriate

## 2020-11-19 NOTE — ASSESSMENT
[FreeTextEntry1] : Chronic sinusitis:\par \par Patient has been treated with optimal therapy with persistent symptoms.  He is scheduled for sinus surgery with Dr. Villa.   He will continue with Sinus Rinse with budesonide/mupirocin. \par \par Moderate persistent asthma:\par \par Continue with Trelegy at this time\par PFTs with Dr. Hernandez and patient to return for asthma clearance prior to the surgery \par \par This visit was provided via telehealth using real time 2-way audio visual technology.  The patient, Oleksandr Ordoñez, was located at home, at the time of the visit.   The provider, Mitchell Boxer, M.D., was located at the office, [address] [place] [address], at the time of the visit.\par \par The patient, Oleksandr Ordoñez and physician, Mitchell Boxer, M.D., participated in the telehealth encounter.\par \par Verbal consent obtained by  from patient.\par \par The majority of time (>50%) was spent on counseling and coordination of care with this patient and/or family member.  The approximate physician face to face time was 15 minutes for the diagnosis of chronic sinusitis and moderate persistent asthma. \par \par

## 2020-11-23 ENCOUNTER — APPOINTMENT (OUTPATIENT)
Dept: OTOLARYNGOLOGY | Facility: CLINIC | Age: 60
End: 2020-11-23

## 2020-11-23 DIAGNOSIS — Z01.812 ENCOUNTER FOR PREPROCEDURAL LABORATORY EXAMINATION: ICD-10-CM

## 2020-11-25 ENCOUNTER — APPOINTMENT (OUTPATIENT)
Dept: ALLERGY | Facility: CLINIC | Age: 60
End: 2020-11-25

## 2020-11-28 ENCOUNTER — TRANSCRIPTION ENCOUNTER (OUTPATIENT)
Age: 60
End: 2020-11-28

## 2020-11-30 ENCOUNTER — NON-APPOINTMENT (OUTPATIENT)
Age: 60
End: 2020-11-30

## 2020-12-01 ENCOUNTER — APPOINTMENT (OUTPATIENT)
Dept: PULMONOLOGY | Facility: CLINIC | Age: 60
End: 2020-12-01
Payer: COMMERCIAL

## 2020-12-01 VITALS
RESPIRATION RATE: 17 BRPM | HEART RATE: 106 BPM | TEMPERATURE: 98 F | SYSTOLIC BLOOD PRESSURE: 140 MMHG | HEIGHT: 65 IN | OXYGEN SATURATION: 98 % | BODY MASS INDEX: 28.66 KG/M2 | DIASTOLIC BLOOD PRESSURE: 80 MMHG | WEIGHT: 172 LBS

## 2020-12-01 DIAGNOSIS — Z82.49 FAMILY HISTORY OF ISCHEMIC HEART DISEASE AND OTHER DISEASES OF THE CIRCULATORY SYSTEM: ICD-10-CM

## 2020-12-01 PROCEDURE — 99072 ADDL SUPL MATRL&STAF TM PHE: CPT

## 2020-12-01 PROCEDURE — 94727 GAS DIL/WSHOT DETER LNG VOL: CPT

## 2020-12-01 PROCEDURE — 99214 OFFICE O/P EST MOD 30 MIN: CPT | Mod: 25

## 2020-12-01 PROCEDURE — 94618 PULMONARY STRESS TESTING: CPT

## 2020-12-01 PROCEDURE — 94729 DIFFUSING CAPACITY: CPT

## 2020-12-01 PROCEDURE — 94010 BREATHING CAPACITY TEST: CPT

## 2020-12-01 PROCEDURE — ZZZZZ: CPT

## 2020-12-01 PROCEDURE — 71046 X-RAY EXAM CHEST 2 VIEWS: CPT

## 2020-12-01 PROCEDURE — 95012 NITRIC OXIDE EXP GAS DETER: CPT

## 2020-12-01 RX ORDER — DOXYCYCLINE 100 MG/1
100 TABLET, FILM COATED ORAL
Qty: 28 | Refills: 0 | Status: DISCONTINUED | COMMUNITY
Start: 2020-10-01 | End: 2020-12-01

## 2020-12-01 RX ORDER — MUPIROCIN 2 G/100G
2 CREAM TOPICAL
Qty: 5 | Refills: 0 | Status: DISCONTINUED | COMMUNITY
Start: 2020-10-01 | End: 2020-12-01

## 2020-12-01 RX ORDER — PREDNISONE 10 MG/1
10 TABLET ORAL
Qty: 30 | Refills: 0 | Status: DISCONTINUED | COMMUNITY
Start: 2020-10-08 | End: 2020-12-01

## 2020-12-01 RX ORDER — AMOXICILLIN AND CLAVULANATE POTASSIUM 875; 125 MG/1; MG/1
875-125 TABLET, COATED ORAL
Qty: 28 | Refills: 0 | Status: DISCONTINUED | COMMUNITY
Start: 2020-11-09 | End: 2020-12-01

## 2020-12-01 RX ORDER — PREDNISONE 10 MG/1
10 TABLET ORAL
Qty: 30 | Refills: 0 | Status: DISCONTINUED | COMMUNITY
Start: 2020-07-21 | End: 2020-12-01

## 2020-12-01 NOTE — ASSESSMENT
[FreeTextEntry1] : Mr. WHITE is a 60 year old male with a history of originally from [place], cervical facet syndrome, deviated septum, chronic sinus issues, herniated disk, GERD, allergies, HLD who comes into the office today for pulmonary evaluation for chronic cough, wheezing, and SOB.\par \par The patient's shortness of breath is multifactorial due to:\par -pulmonary disease \par      -severe persistent asthma\par      -eosinophilic asthma\par      -steroid dependent asthma\par      -Tracheomalacia\par -poor breathing mechanics \par -overweight/out of shape\par -?cardiac disease (doubt)\par \par Problem 1: Severe Persistent Asthma\par -Complete Full PFTs and NiOx when available\par -continue Singulair 10 mg before bed \par -Add Bevespi 2 inhalations BID\par -Add Alvesco (160) 2 inhalations BID\par -Add Ventolin rescue inhaler 2 inhalations before exercise, Q6H \par \par -Asthma is believed to be caused by inherited (genetic) and environmental factor, but its exact cause is unknown. Asthma may be triggered by allergens, lung infections, or irritants in the air. Asthma triggers are different for each person\par -Inhaler technique reviewed as well as oral hygiene techniques reviewed with patient. Avoidance of cold air, extremes of temperature, rescue inhaler should be used before exercise. Order of medication reviewed with patient. Recommended use of a cool mist humidifier in the bedroom.\par \par Problem 2: Steroid Dependent Asthma\par -Patient is a candidate for Dupixent\par -Dupixent is a prescription medicine used with other asthma medicines for the maintenance treatment of moderate-to-severe asthma in people aged 12 years and older whose asthma is not controlled with their current asthma medicines. Dupixent helps prevent severe asthma attacks (exacerbations) and can improve your breathing. Dupixent may also help reduce the amount of oral corticosteroids you need while preventing severe asthma attacks and improving your breathing. Dupixent is not used to treat sudden breathing problems. Risks and side effect of Dupixent were discussed and reviewed with patient.\par \par Problem 3: Eosinophilic asthma\par -Patient is a candidate for Nucala or Fasenra\par -The safety and efficacy of Nucala was established in three double-blind, randomized, placebo-controlled trials in patients with severe asthma. Compared to a placebo, patients with severe asthma receiving Nucala had fewer exacerbation requiring hospitalization and/or emergency department visits, and a longer time to first exacerbation. In addition, patients with severe asthma receiving Nucala or Fasenra experienced greater reductions in their daily maintenance oral corticosteroid dose, while maintaining asthma control compared with patients receiving placebo. Treatment with Nucala did not result in a significant improvement in lung function, as measured by the volume of air exhaled by patients in one second. The most common side effects include: headache, injection site reactions, back pain, weakness, and fatigue; hypersensitivity reactions can occur within hours or days including swelling of the face, mouth, and tongue, fainting, dizziness, hives, breathing problems, and rash; herpes zoster infections have occurred. The drug is a monoclonal antibody that inhibits interleukin-5 which helps regular eosinophils, a type of white blood cell that contributes to asthma. The over-production of eosinophils can cause inflammation in the lungs, increasing the frequency of asthma attacks. Patients must also take other medications, including high dose inhaled corticosteroids and at least one additional asthma drug.\par \par Problem 4: Chronic Allergy / Sinus\par -Add Xhance 1 sniff BID \par -Add Olopatadine 0.6% at 1 sniff/nostril BID \par -add Xyzal 5 mg qHS \par Environmental measures for allergies were encouraged including mattress and pillow cover, air purifier, and environmental controls. \par \par Problem 5: ?Tracheomalacia\par -Complete a dynamic Ct of the chest to r/o TBM\par Tracheomalacia is usually acquired in adults and common causes include damage by tracheostomy or endotracheal intubation damaging the tracheal cartilage with increase risk with multiple intubations, prolonged intubation, and concurrent high dose steroid therapy; external chest wall trauma and surgery; chronic compression of the trachea by benign etiologies (eg, benign mediastinal goiter) or malignancy; relapsing polychondritis; or recurrent infection. Tracheomalacia can be asymptomatic, however signs or symptoms can develop as the severity of the airway narrowing progresses with major symptoms include dyspnea, cough, and sputum retention. Other symptoms include severe paroxysms of coughing, wheezing or stridor, barking cough and may be exacerbated by forced expiration, cough, and valsalva maneuver. Tracheomalacia is diagnosed by a bronchoscopic visualization of dynamic airway collapse on dynamic chest CT. Therapy is warranted in symptomatic patients with severe tracheomalacia and includes surgical repair as tracheobronchoplasty. The patient was referred to Dr. Zaid Hernandez or Dr. Allen Fuentes, at [facility] for a surgical consult. \par \par Problem 6: GERD\par -continue Protonix 40 mg before breakfast \par -Rule of 2s: avoid eating too much, eating too fast, eating too late, eating too spicy, eating too lousy, eating two hours before bed.\par -Things to avoid including overeating, spicy foods, tight clothing, eating within three hours of bed, this list is not all inclusive. \par -For treatment of reflux, possible options discussed including diet control, H2 blockers, PPIs, as well as coating motility agents discussed as treatment options. Timing of meals and proximity of last meal to sleep were discussed. If symptoms persist, a formal gastrointestinal evaluation is needed.\par \par Problem 7: R/o PIO\par -Recommended to complete a home sleep study due to elevated MP class, snoring, large neck size\par Sleep apnea is associated with adverse clinical consequences which an affect most organ systems. Cardiovascular disease risk includes arrhythmias, atrial fibrillation, hypertension, coronary artery disease, and stroke. Metabolic disorders include diabetes type 2, non-alcoholic fatty liver disease. Mood disorder especially depression; and cognitive decline especially in the elderly. Associations with chronic reflux/Guerrero’s esophagus some but not all inclusive. \par -Reasons include arousal consistent with hypopnea; respiratory events most prominent in REM sleep or supine position; therefore sleep staging and body position are important for accurate diagnosis and estimation of AHI. \par \par Problem 8: Poor Mechanics of Breathing\par - Proper breathing techniques were reviewed with an emphasis of exhalation. Patient instructed to breath in for 1 second and out for four seconds. Patient was encouraged to not talk while walking. \par \par Problem 9: Overweight\par -Weight loss, exercise, and diet control were discussed and are highly encouraged. Treatment options were given such as, aqua therapy, and contacting a nutritionist. Recommended to use the elliptical, stationary bike, less use of treadmill. Mindful eating was explained to the patient Obesity is associated with worsening asthma, shortness of breath, and potential for cardiac disease, diabetes, and other underlying medical conditions. \par \par Problem 10: Cardiac\par -recommended to follow up with a cardiologist\par \par Problem 11: health maintenance\par -s/p influenza vaccine\par -recommended strep pneumonia vaccines after age 65: Prevnar-13 vaccine, followed by Pneumo vaccine 23 one year following\par -recommended early intervention for URIs\par -recommended regular osteoporosis evaluations\par -recommended early dermatological evaluations\par -recommended after the age of 50 to the age of 70, colonoscopy every 5 years \par \par  Follow up in 6-8 weeks\par -he  is recommended to call with any changes, questions, or concerns.

## 2020-12-01 NOTE — HISTORY OF PRESENT ILLNESS
[TextBox_4] : Mr. WHITE is a 60 year old male presenting to the office today for initial pulmonary evaluation. His chief complaint is chronic cough / sinus issues.\par -he notes he developed a cough during June. He was given albuterol by is primary doctor, but it didn’t help. He was sent to a pulmonologist who had him do many tests.\par -he went to Dr. Boxer allergist - who diagnosed his issue as chronic sinus issues - gave him Augmentin and Prednisone, and Doxycycline afterward.\par -He went to an ENT who gave him a round of ABx, which didn’t help him\par -he began to wake up 2-3 times per night from thick sputum, and it gave him anxiety as well.\par -he went back to Dr. Boxer 4 days ago and got a 2nd sinus CT, which showed mild changes, and was given Prednisone and another Abx course, and his cough had completely cleared while on it. It has \par -he was given Trelegy and Albuterol, which helped him for his breathing at night\par -he notes he has post nasal drip and itchy eyes secondary to allergies\par -he notes he has reflux, controlled with medication.\par -he notes he snores occasionally\par -he notes his energy level is generally very high\par -he could not fall asleep while watching a boring TV show\par -he could fall asleep in a car occasionally\par -he notes his neck size is 16-17\par -he notes he wakes up with nocturia occasionally\par -he notes he had some trouble with SOB about 10 years ago, but it resolved. Otherwise he has had no diagnosis of asthma\par -he denies any chest pain, chest pressure, diarrhea, constipation, dysphagia, dizziness, leg swelling, leg pain, itchy eyes, itchy ears, heartburn, reflux, sour taste in the mouth, myalgias or arthralgias.

## 2020-12-01 NOTE — REVIEW OF SYSTEMS
[Negative] : Endocrine [Nasal Congestion] : nasal congestion [Postnasal Drip] : postnasal drip [Sinus Problems] : sinus problems [Cough] : cough [Sputum] : sputum [Itchy Eyes] : itchy eyes [Seasonal Allergies] : seasonal allergies [Chest Discomfort] : no chest discomfort [GERD] : no gerd [Diarrhea] : no diarrhea [Constipation] : no constipation [Dysphagia] : no dysphagia [Dizziness] : no dizziness

## 2020-12-01 NOTE — PHYSICAL EXAM
[No Acute Distress] : no acute distress [Normal Oropharynx] : normal oropharynx [Normal Appearance] : normal appearance [No Neck Mass] : no neck mass [Normal Rate/Rhythm] : normal rate/rhythm [Normal S1, S2] : normal s1, s2 [No Murmurs] : no murmurs [No Resp Distress] : no resp distress [Clear to Auscultation Bilaterally] : clear to auscultation bilaterally [No Abnormalities] : no abnormalities [Benign] : benign [Normal Gait] : normal gait [No Clubbing] : no clubbing [No Cyanosis] : no cyanosis [No Edema] : no edema [FROM] : FROM [Normal Color/ Pigmentation] : normal color/ pigmentation [No Focal Deficits] : no focal deficits [Oriented x3] : oriented x3 [Normal Affect] : normal affect [III] : Mallampati Class: III [TextBox_68] : I:E ratio 1:3; expiratory wheezes b/l

## 2020-12-01 NOTE — PROCEDURE
[FreeTextEntry1] : Full PFT revealed mild-moderate obstructive dysfunction, with a FEV1 of 2.43L, which is 79% of predicted, and a normal diffusion of 23.3, which is 107% of predicted, with a normal flow volume loop\par \par FENO was 108; a normal value being less than 25\par Fractional exhaled nitric oxide (FENO) is regarded as a simple, noninvasive method for assessing eosinophilic airway inflammation. Produced by a variety of cells within the lung, nitric oxide (NO) concentrations are generally low in healthy individuals. However, high concentrations of NO appear to be involved in nonspecific host defense mechanisms and chronic inflammatory diseases such as asthma. The American Thoracic Society (ATS) therefore has recommended using FENO to aid in the diagnosis and monitoring of eosinophilic airway inflammation and asthma, and for identifying steroid responsive individuals whose chronic respiratory symptoms may be caused by airway inflammation. \par \par 6 minute walk test reveals a low saturation of 93% with very mild dyspnea; walked 586.8 meters \par \par CXR reveals a normal sized heart; no evidence of infiltrate or effusion--a normal appearing chest radiograph \par \par Chest CT (9/10/2020) reveals no pneumonia.\par \par CT sinus (11/13/2020) reveals chronic pansinusitis with overall improved aeration of the sinuses when compared with the prior exam.\par Nasal septal deviation to the right with narrowing of the right nasal cavity.\par Narrowing/obstruction of sinus drainage pathways.\par \par Full PFT (9/18/2020) revealed normal flows / very mild obstructive dysfunction, with a FEV1 of 3.44L, which is 83% of predicted, normal lung volumes, and a normal diffusion of 26.49, which is 96% of predicted, which corrects to 4.4 or 93% when corrected for volume, with a normal flow volume loop

## 2020-12-01 NOTE — ADDENDUM
[FreeTextEntry1] : Documented by Baljinder Thompson acting as a scribe for Dr. Zeyad Hernandez on 12/01/2020.\par \par All medical record entries made by the Scribe were at my, Dr. Zeyad Hernandez's, direction and personally dictated by me on 12/01/2020. I have reviewed the chart and agree that the record accurately reflects my personal performance of the history, physical exam, assessment and plan. I have also personally directed, reviewed, and agree with the discharge instructions.

## 2020-12-01 NOTE — REASON FOR VISIT
[Initial] : an initial visit [TextBox_44] : severe persistent / steroid dependent / eosinophilic asthma, chronic sinus / allergies, GERD, ?TBM, ?PIO

## 2020-12-05 ENCOUNTER — APPOINTMENT (OUTPATIENT)
Dept: DISASTER EMERGENCY | Facility: CLINIC | Age: 60
End: 2020-12-05

## 2020-12-07 ENCOUNTER — APPOINTMENT (OUTPATIENT)
Dept: OTOLARYNGOLOGY | Facility: HOSPITAL | Age: 60
End: 2020-12-07

## 2020-12-09 ENCOUNTER — APPOINTMENT (OUTPATIENT)
Dept: PULMONOLOGY | Facility: CLINIC | Age: 60
End: 2020-12-09

## 2020-12-09 ENCOUNTER — APPOINTMENT (OUTPATIENT)
Dept: CT IMAGING | Facility: IMAGING CENTER | Age: 60
End: 2020-12-09
Payer: COMMERCIAL

## 2020-12-09 ENCOUNTER — RESULT REVIEW (OUTPATIENT)
Age: 60
End: 2020-12-09

## 2020-12-09 ENCOUNTER — TRANSCRIPTION ENCOUNTER (OUTPATIENT)
Age: 60
End: 2020-12-09

## 2020-12-09 ENCOUNTER — OUTPATIENT (OUTPATIENT)
Dept: OUTPATIENT SERVICES | Facility: HOSPITAL | Age: 60
LOS: 1 days | End: 2020-12-09
Payer: COMMERCIAL

## 2020-12-09 DIAGNOSIS — Z98.890 OTHER SPECIFIED POSTPROCEDURAL STATES: Chronic | ICD-10-CM

## 2020-12-09 DIAGNOSIS — Z00.8 ENCOUNTER FOR OTHER GENERAL EXAMINATION: ICD-10-CM

## 2020-12-09 DIAGNOSIS — J39.8 OTHER SPECIFIED DISEASES OF UPPER RESPIRATORY TRACT: ICD-10-CM

## 2020-12-09 PROCEDURE — 71250 CT THORAX DX C-: CPT

## 2020-12-09 PROCEDURE — 71250 CT THORAX DX C-: CPT | Mod: 26

## 2020-12-09 RX ORDER — GLYCOPYRROLATE AND FORMOTEROL FUMARATE 9; 4.8 UG/1; UG/1
9-4.8 AEROSOL, METERED RESPIRATORY (INHALATION)
Qty: 3 | Refills: 1 | Status: DISCONTINUED | COMMUNITY
Start: 2020-12-01 | End: 2020-12-09

## 2020-12-10 ENCOUNTER — TRANSCRIPTION ENCOUNTER (OUTPATIENT)
Age: 60
End: 2020-12-10

## 2020-12-12 ENCOUNTER — NON-APPOINTMENT (OUTPATIENT)
Age: 60
End: 2020-12-12

## 2020-12-15 ENCOUNTER — NON-APPOINTMENT (OUTPATIENT)
Age: 60
End: 2020-12-15

## 2020-12-15 ENCOUNTER — TRANSCRIPTION ENCOUNTER (OUTPATIENT)
Age: 60
End: 2020-12-15

## 2020-12-22 ENCOUNTER — APPOINTMENT (OUTPATIENT)
Dept: PULMONOLOGY | Facility: CLINIC | Age: 60
End: 2020-12-22
Payer: COMMERCIAL

## 2020-12-22 VITALS
RESPIRATION RATE: 17 BRPM | WEIGHT: 176 LBS | HEIGHT: 65 IN | BODY MASS INDEX: 29.32 KG/M2 | SYSTOLIC BLOOD PRESSURE: 120 MMHG | TEMPERATURE: 97.2 F | DIASTOLIC BLOOD PRESSURE: 62 MMHG | HEART RATE: 80 BPM | OXYGEN SATURATION: 96 %

## 2020-12-22 DIAGNOSIS — Z29.9 ENCOUNTER FOR PROPHYLACTIC MEASURES, UNSPECIFIED: ICD-10-CM

## 2020-12-22 PROCEDURE — 99072 ADDL SUPL MATRL&STAF TM PHE: CPT

## 2020-12-22 PROCEDURE — 99212 OFFICE O/P EST SF 10 MIN: CPT

## 2021-01-05 ENCOUNTER — NON-APPOINTMENT (OUTPATIENT)
Age: 61
End: 2021-01-05

## 2021-01-06 ENCOUNTER — TRANSCRIPTION ENCOUNTER (OUTPATIENT)
Age: 61
End: 2021-01-06

## 2021-01-07 ENCOUNTER — NON-APPOINTMENT (OUTPATIENT)
Age: 61
End: 2021-01-07

## 2021-01-07 ENCOUNTER — APPOINTMENT (OUTPATIENT)
Dept: THORACIC SURGERY | Facility: CLINIC | Age: 61
End: 2021-01-07
Payer: COMMERCIAL

## 2021-01-07 VITALS
OXYGEN SATURATION: 94 % | SYSTOLIC BLOOD PRESSURE: 125 MMHG | DIASTOLIC BLOOD PRESSURE: 85 MMHG | RESPIRATION RATE: 17 BRPM | BODY MASS INDEX: 29.16 KG/M2 | HEART RATE: 75 BPM | WEIGHT: 175 LBS | TEMPERATURE: 97 F | HEIGHT: 65 IN

## 2021-01-07 DIAGNOSIS — R06.2 WHEEZING: ICD-10-CM

## 2021-01-07 PROCEDURE — 99072 ADDL SUPL MATRL&STAF TM PHE: CPT

## 2021-01-07 PROCEDURE — 99204 OFFICE O/P NEW MOD 45 MIN: CPT

## 2021-01-07 NOTE — PHYSICAL EXAM
[General Appearance - Alert] : alert [General Appearance - In No Acute Distress] : in no acute distress [Outer Ear] : the ears and nose were normal in appearance [Both Tympanic Membranes Were Examined] : both tympanic membranes were normal [Neck Appearance] : the appearance of the neck was normal [] : no respiratory distress [Respiration, Rhythm And Depth] : normal respiratory rhythm and effort [Apical Impulse] : the apical impulse was normal [2+] : left 2+ [Abnormal Walk] : normal gait [Musculoskeletal - Swelling] : no joint swelling seen [Skin Color & Pigmentation] : normal skin color and pigmentation [Skin Turgor] : normal skin turgor [Oriented To Time, Place, And Person] : oriented to person, place, and time

## 2021-01-13 ENCOUNTER — NON-APPOINTMENT (OUTPATIENT)
Age: 61
End: 2021-01-13

## 2021-01-14 ENCOUNTER — TRANSCRIPTION ENCOUNTER (OUTPATIENT)
Age: 61
End: 2021-01-14

## 2021-01-19 ENCOUNTER — APPOINTMENT (OUTPATIENT)
Dept: PULMONOLOGY | Facility: CLINIC | Age: 61
End: 2021-01-19
Payer: COMMERCIAL

## 2021-01-19 ENCOUNTER — NON-APPOINTMENT (OUTPATIENT)
Age: 61
End: 2021-01-19

## 2021-01-19 VITALS
TEMPERATURE: 97.3 F | WEIGHT: 176 LBS | BODY MASS INDEX: 29.32 KG/M2 | HEIGHT: 65 IN | DIASTOLIC BLOOD PRESSURE: 80 MMHG | SYSTOLIC BLOOD PRESSURE: 120 MMHG | HEART RATE: 71 BPM | OXYGEN SATURATION: 96 % | RESPIRATION RATE: 16 BRPM

## 2021-01-19 PROCEDURE — 95012 NITRIC OXIDE EXP GAS DETER: CPT

## 2021-01-19 PROCEDURE — 99214 OFFICE O/P EST MOD 30 MIN: CPT | Mod: 25

## 2021-01-19 PROCEDURE — 99072 ADDL SUPL MATRL&STAF TM PHE: CPT

## 2021-01-19 PROCEDURE — 94010 BREATHING CAPACITY TEST: CPT

## 2021-01-19 NOTE — PHYSICAL EXAM
[No Acute Distress] : no acute distress [Normal Oropharynx] : normal oropharynx [III] : Mallampati Class: III [Normal Appearance] : normal appearance [No Neck Mass] : no neck mass [Normal Rate/Rhythm] : normal rate/rhythm [Normal S1, S2] : normal s1, s2 [No Murmurs] : no murmurs [No Resp Distress] : no resp distress [Clear to Auscultation Bilaterally] : clear to auscultation bilaterally [No Abnormalities] : no abnormalities [Benign] : benign [Normal Gait] : normal gait [No Clubbing] : no clubbing [No Cyanosis] : no cyanosis [No Edema] : no edema [Normal Color/ Pigmentation] : normal color/ pigmentation [FROM] : FROM [No Focal Deficits] : no focal deficits [Oriented x3] : oriented x3 [Normal Affect] : normal affect [TextBox_68] : I:E ratio 1:3; clear

## 2021-01-19 NOTE — HISTORY OF PRESENT ILLNESS
[TextBox_4] : Mr. WHITE is a 60 year old male with a history of allergies, asthma, chronic cough, eosinophilic asthma, GERD, pulmonary nodules, snoring, SOB, bronchomalacia, and wheezing presenting to the office today for follow up pulmonary evaluation. His chief complaint is weight / lack of exercise\par -he reports he feels about 85% better\par -he states he coughs about 1-2 times per day. No productivity with his cough anymore\par -he reports he is not exercising enough\par -he states his sinuses are much clearer than before, and are now back to his normal\par -he notes he gained some weight from having stopped exercising\par -he reports his snoring seems to remain the same\par -he denies any headaches, nausea, vomiting, fever, chills, sweats, chest pain, chest pressure, diarrhea, constipation, dysphagia, dizziness, sour taste in the mouth, leg swelling, leg pain, itchy eyes, itchy ears, heartburn, reflux, myalgias or arthralgias.

## 2021-01-19 NOTE — ADDENDUM
[FreeTextEntry1] : Documented by Baljinder Thompson acting as a scribe for Dr. Zeyad Hernandez on 01/19/2021.\par \par All medical record entries made by the Scribe were at my, Dr. Zeyad Hernandez's, direction and personally dictated by me on 01/19/2021. I have reviewed the chart and agree that the record accurately reflects my personal performance of the history, physical exam, assessment and plan. I have also personally directed, reviewed, and agree with the discharge instructions.

## 2021-01-19 NOTE — REASON FOR VISIT
[Follow-Up] : a follow-up visit [TextBox_44] : severe persistent / steroid dependent / eosinophilic asthma, chronic sinus / allergies, GERD, bronchomalacia, ?PIO

## 2021-01-19 NOTE — ASSESSMENT
[FreeTextEntry1] : Mr. WHITE is a 60 year old male with a history of originally from [place], cervical facet syndrome, deviated septum, chronic sinus issues, herniated disk, GERD, allergies, HLD, eosinophilic asthma, elevated IgE, allergy, GERD, bronchomalacia who comes into the office today for pulmonary evaluation for chronic cough, wheezing, and SOB - improved\par \par The patient's shortness of breath is multifactorial due to:\par -pulmonary disease \par      -severe persistent asthma\par      -eosinophilic asthma\par      -steroid dependent asthma\par      -Tracheomalacia\par -poor breathing mechanics \par -overweight/out of shape\par -?cardiac disease (doubt)\par \par Problem 1: Severe Persistent Asthma (improved)\par -Complete Full PFTs and NiOx when available\par -continue Singulair 10 mg before bed \par -continue Anoro Ellipta at 1 inhalation daily\par -continue Alvesco (160) 2 inhalations BID\par -continue Ventolin rescue inhaler 2 inhalations before exercise, Q6H \par \par -Asthma is believed to be caused by inherited (genetic) and environmental factor, but its exact cause is unknown. Asthma may be triggered by allergens, lung infections, or irritants in the air. Asthma triggers are different for each person\par -Inhaler technique reviewed as well as oral hygiene techniques reviewed with patient. Avoidance of cold air, extremes of temperature, rescue inhaler should be used before exercise. Order of medication reviewed with patient. Recommended use of a cool mist humidifier in the bedroom.\par \par Problem 2: Steroid Dependent Asthma\par -Patient is a candidate for Dupixent. S/p 3rd shot of Dupixent\par -Dupixent is a prescription medicine used with other asthma medicines for the maintenance treatment of moderate-to-severe asthma in people aged 12 years and older whose asthma is not controlled with their current asthma medicines. Dupixent helps prevent severe asthma attacks (exacerbations) and can improve your breathing. Dupixent may also help reduce the amount of oral corticosteroids you need while preventing severe asthma attacks and improving your breathing. Dupixent is not used to treat sudden breathing problems. Risks and side effect of Dupixent were discussed and reviewed with patient.\par \par Problem 3: Eosinophilic asthma\par -Patient is a candidate for Nucala or Fasenra\par -The safety and efficacy of Nucala was established in three double-blind, randomized, placebo-controlled trials in patients with severe asthma. Compared to a placebo, patients with severe asthma receiving Nucala had fewer exacerbation requiring hospitalization and/or emergency department visits, and a longer time to first exacerbation. In addition, patients with severe asthma receiving Nucala or Fasenra experienced greater reductions in their daily maintenance oral corticosteroid dose, while maintaining asthma control compared with patients receiving placebo. Treatment with Nucala did not result in a significant improvement in lung function, as measured by the volume of air exhaled by patients in one second. The most common side effects include: headache, injection site reactions, back pain, weakness, and fatigue; hypersensitivity reactions can occur within hours or days including swelling of the face, mouth, and tongue, fainting, dizziness, hives, breathing problems, and rash; herpes zoster infections have occurred. The drug is a monoclonal antibody that inhibits interleukin-5 which helps regular eosinophils, a type of white blood cell that contributes to asthma. The over-production of eosinophils can cause inflammation in the lungs, increasing the frequency of asthma attacks. Patients must also take other medications, including high dose inhaled corticosteroids and at least one additional asthma drug.\par \par Problem 4: Chronic Allergy / Sinus\par -continue Xhance 1 sniff BID \par -continue Olopatadine 0.6% at 1 sniff/nostril BID \par -continue Xyzal 5 mg qHS \par Environmental measures for allergies were encouraged including mattress and pillow cover, air purifier, and environmental controls. \par \par Problem 5: Bronchomalacia\par -s/p dynamic Ct of the chest with Dr. Hernandez\par Tracheomalacia is usually acquired in adults and common causes include damage by tracheostomy or endotracheal intubation damaging the tracheal cartilage with increase risk with multiple intubations, prolonged intubation, and concurrent high dose steroid therapy; external chest wall trauma and surgery; chronic compression of the trachea by benign etiologies (eg, benign mediastinal goiter) or malignancy; relapsing polychondritis; or recurrent infection. Tracheomalacia can be asymptomatic, however signs or symptoms can develop as the severity of the airway narrowing progresses with major symptoms include dyspnea, cough, and sputum retention. Other symptoms include severe paroxysms of coughing, wheezing or stridor, barking cough and may be exacerbated by forced expiration, cough, and valsalva maneuver. Tracheomalacia is diagnosed by a bronchoscopic visualization of dynamic airway collapse on dynamic chest CT. Therapy is warranted in symptomatic patients with severe tracheomalacia and includes surgical repair as tracheobronchoplasty. The patient was referred to Dr. Zaid Hernandez or Dr. Allen Fuentes, at [facility] for a surgical consult. \par \par Problem 6: GERD\par -continue Protonix 40 mg before breakfast \par -Rule of 2s: avoid eating too much, eating too fast, eating too late, eating too spicy, eating too lousy, eating two hours before bed.\par -Things to avoid including overeating, spicy foods, tight clothing, eating within three hours of bed, this list is not all inclusive. \par -For treatment of reflux, possible options discussed including diet control, H2 blockers, PPIs, as well as coating motility agents discussed as treatment options. Timing of meals and proximity of last meal to sleep were discussed. If symptoms persist, a formal gastrointestinal evaluation is needed.\par \par Problem 7: R/o PIO (? present)\par -Recommended to complete a home sleep study due to elevated MP class, snoring, large neck size\par Sleep apnea is associated with adverse clinical consequences which an affect most organ systems. Cardiovascular disease risk includes arrhythmias, atrial fibrillation, hypertension, coronary artery disease, and stroke. Metabolic disorders include diabetes type 2, non-alcoholic fatty liver disease. Mood disorder especially depression; and cognitive decline especially in the elderly. Associations with chronic reflux/Guerrero’s esophagus some but not all inclusive. \par -Reasons include arousal consistent with hypopnea; respiratory events most prominent in REM sleep or supine position; therefore sleep staging and body position are important for accurate diagnosis and estimation of AHI. \par \par Problem 8: Poor Mechanics of Breathing\par - Proper breathing techniques were reviewed with an emphasis of exhalation. Patient instructed to breath in for 1 second and out for four seconds. Patient was encouraged to not talk while walking. \par \par Problem 9: Overweight\par -Weight loss, exercise, and diet control were discussed and are highly encouraged. Treatment options were given such as, aqua therapy, and contacting a nutritionist. Recommended to use the elliptical, stationary bike, less use of treadmill. Mindful eating was explained to the patient Obesity is associated with worsening asthma, shortness of breath, and potential for cardiac disease, diabetes, and other underlying medical conditions. \par \par Problem 10: Cardiac\par -recommended to follow up with a cardiologist\par \par Problem 11: health maintenance\par -s/p influenza vaccine 2020\par -recommended strep pneumonia vaccines after age 65: Prevnar-13 vaccine, followed by Pneumo vaccine 23 one year following\par -recommended early intervention for URIs\par -recommended regular osteoporosis evaluations\par -recommended early dermatological evaluations\par -recommended after the age of 50 to the age of 70, colonoscopy every 5 years \par \par  Follow up in 6-8 weeks\par -he  is recommended to call with any changes, questions, or concerns. 
None

## 2021-01-19 NOTE — PROCEDURE
[FreeTextEntry1] : PFT revealed normal flows, with a FEV1 of 3.26L, which is 107% of predicted, with a normal flow volume loop \par \par FENO was 20; a normal value being less than 25\par Fractional exhaled nitric oxide (FENO) is regarded as a simple, noninvasive method for assessing eosinophilic airway inflammation. Produced by a variety of cells within the lung, nitric oxide (NO) concentrations are generally low in healthy individuals. However, high concentrations of NO appear to be involved in nonspecific host defense mechanisms and chronic inflammatory diseases such as asthma. The American Thoracic Society (ATS) therefore has recommended using FENO to aid in the diagnosis and monitoring of eosinophilic airway inflammation and asthma, and for identifying steroid responsive individuals whose chronic respiratory symptoms may be caused by airway inflammation. \par \par Chest CT (12/9/2020) reveals findings consistent with bronchomalacia. Remaining incidental findings as above are stable since previous examination as detailed above.

## 2021-01-19 NOTE — REVIEW OF SYSTEMS
[Negative] : Endocrine [Cough] : cough [Nasal Congestion] : no nasal congestion [Postnasal Drip] : no postnasal drip [Sinus Problems] : no sinus problems [Sputum] : no sputum [Chest Discomfort] : no chest discomfort [GERD] : no gerd [Diarrhea] : no diarrhea [Constipation] : no constipation [Dysphagia] : no dysphagia

## 2021-01-22 ENCOUNTER — APPOINTMENT (OUTPATIENT)
Dept: GASTROENTEROLOGY | Facility: CLINIC | Age: 61
End: 2021-01-22
Payer: COMMERCIAL

## 2021-01-22 PROCEDURE — 99205 OFFICE O/P NEW HI 60 MIN: CPT | Mod: 95

## 2021-01-22 RX ORDER — OMEPRAZOLE 40 MG/1
40 CAPSULE, DELAYED RELEASE ORAL
Qty: 30 | Refills: 0 | Status: DISCONTINUED | COMMUNITY
Start: 2020-07-21 | End: 2021-01-22

## 2021-01-22 NOTE — PHYSICAL EXAM
[General Appearance - Alert] : alert [General Appearance - Well Nourished] : well nourished [Sclera] : the sclera and conjunctiva were normal [Outer Ear] : the ears and nose were normal in appearance [Hearing Threshold Finger Rub Not Bland] : hearing was normal [Examination Of The Oral Cavity] : the lips and gums were normal [] : no respiratory distress [Respiration, Rhythm And Depth] : normal respiratory rhythm and effort [Exaggerated Use Of Accessory Muscles For Inspiration] : no accessory muscle use [No Focal Deficits] : no focal deficits [Impaired Insight] : insight and judgment were intact [Affect] : the affect was normal [FreeTextEntry1] : aox3

## 2021-01-22 NOTE — HISTORY OF PRESENT ILLNESS
[FreeTextEntry1] : Telehealth appointment\par Patient at Home in [facility], Physician in office\par Patient informed about reason for appt and privacy risks associated with technology\par Patient provided verbal consent for telehealth appointment today and had no administrative questions\par \par ------------------------------------------------------------------\par \par 59 yo M pmh chronic cough, cervical facet syndrome, possible bronchomalacia who was referred by Dr. Hernandez for pH testing.\par \par GERD\par Has had a long time\par Major symptoms were epigastric burning, regurgitation (at night), and burning in chest\par Is waking up in night from standpoint\par Can having severe symptoms with pain on back\par Also with globus intermittently\par \par On Pantoprazole daily - this has largely controlled symptoms\par \par No dysphagia\par No chest pain\par No n/v\par No abdominal pain\par \par SOB/Chronic Cough\par Noticed to have bronchomalacia on dynamic testing with 30% closing\par Has eosinophilic asthma - on multiple meds\par Referred for pH testing\par Able to go mountain biking when controlled\par \par \par Colonoscopy:\par Last one was 3 years ago -> due for one at this time\par Wishes to have at same time as EGD\par \par \par \par ----------------------------------------------------------------------------------\par \par \par \par Last visit with Mary:\par 61 y/o male, nonsmoker with a PMH of chronic cough, chronic sinus issues, cervical facet syndrome, deviated septum, chronic sinus issues, herniated disk, GERD, allergies, HLD, and TBM. He was evaluated by Dr. Hernandze and sent today for an initial evaluation of TBM\par \par He reports symptoms of productive cough, wheezing, began this summer. Over the course of the year, treated with at least 4 courses of prednisone. Overall, patient reports productive cough and wheezing greatly improved since starting inhalers with Dr Hernandez. \par \par He also reports acid reflux for many years and reports acid waking him sometimes. He is on pantoprazole. Advised him to try bed risers to elevated head of bed and decrease reflux of acid with gravity. \par \par He was has been followed by an allergist and ENT for allergies, chronic sinus issues and deviated septum. Sinus surgery deferred due to COVID. Advised him to follow-up ENT.\par \par Explained to patient that tracheobronchomalacia is a progressive weakness of the trachea often associated with asthma, GERD, PIO, sinusitis. It is diagnosed by a dynamic CT Chest and an awake dynamic bronchoscopy. As disease progresses, patient may develop worsening barking cough, dyspnea, difficulty expectorating, frequent PNAs. Tracheobronchoplasty may be warranted in symptomatic patients severe tracheomalacia. \par \par If worsening cough, advised patient to undergo awake dynamic bronchoscopy to evaluate extent of disease. Since cough improved, no indication to perform awake bronchoscopy at this time.\par \par I have reviewed the patient's medical records and diagnostic images at the time of this office consultation and have made the following recommendation.\par Plan:\par 1. Bravo \par 2. Followup with ENT for voice hoarseness, possible laryngoscope

## 2021-01-22 NOTE — ASSESSMENT
[FreeTextEntry1] : 61 yo M pmh chronic cough, cervical facet syndrome, steroid dependant asthma c/b possible bronchomalacia who was referred by Dr. Hernandez for pH testing.  WIll need EGD/Bravo OFF therapy to see if GERD is concomitant factor.  I do suspect this will be positive, and ultimately he may need a ph Impedance/emano ON therapy down the road if surgical repair is being considered.  Will plan for colonoscopy at same time to minimize number of sedations\par \par Impression:\par 1) GERD\par 2) Colon polyps\par 3) Severe persistent asthma\par 4) ? Bronchomalacia\par \par Plan:\par 1) EGD + Bravo + Colonoscopy\par - Needs PSTs prior\par 2) OFF PPI x 7 days for Bravo, but otherwise remain on daily PPI at this time\par 3) Consider emano/24 hr testing ON therapy down the road\par 4) All discussed at length.  All questions answered.  Plan agreed upon\par 5) RV pending above\par

## 2021-01-28 ENCOUNTER — NON-APPOINTMENT (OUTPATIENT)
Age: 61
End: 2021-01-28

## 2021-01-29 NOTE — ASSESSMENT
[FreeTextEntry1] : 59 y/o male, nonsmoker with a PMH of chronic cough, chronic sinus issues, cervical facet syndrome, deviated septum, chronic sinus issues, herniated disk, GERD, allergies, HLD, and TBM. He was evaluated by Dr. Hernandez and sent today for an initial evaluation of TBM\par \par He reports symptoms of productive cough, wheezing, began this summer. Over the course of the year, treated with at least 4 courses of prednisone. Overall, patient reports productive cough and wheezing greatly improved since starting inhalers with Dr Hernandez. \par \par He also reports acid reflux for many years and reports acid waking him sometimes. He is on pantoprazole. Advised him to try bed risers to elevated head of bed and decrease reflux of acid with gravity.  \par \par He was has been followed by an allergist and ENT for allergies, chronic sinus issues and deviated septum. Sinus surgery deferred due to COVID. Advised him to follow-up ENT.\par \par Explained to patient that tracheobronchomalacia is a progressive weakness of the trachea  often associated with asthma, GERD, PIO, sinusitis. It is diagnosed by a dynamic CT Chest and an awake dynamic bronchoscopy. As disease progresses, patient may develop worsening barking cough, dyspnea, difficulty expectorating, frequent PNAs. Tracheobronchoplasty may be warranted in symptomatic patients severe tracheomalacia. \par \par If worsening cough, advised patient to undergo awake dynamic bronchoscopy to evaluate extent of disease. Since cough improved, no indication to perform awake bronchoscopy at this time.\par \par I have reviewed the patient's medical records and diagnostic images at the time of this office consultation and have made the following recommendation.\par Plan:\par 1. Bravo \par 2. Followup with ENT for voice hoarseness, possible laryngoscope\par

## 2021-01-29 NOTE — HISTORY OF PRESENT ILLNESS
[FreeTextEntry1] : 61 y/o male, nonsmoker with a PMH of chronic cough, chronic sinus issues, cervical facet syndrome, deviated septum, chronic sinus issues, herniated disk, GERD, allergies, HLD, and TBM. He was evaluated by Dr. Hernandez and sent today for an initial evaluation. \par \par Dynamic CT chest completed on 12/09/20:\par -findings consistent with bronchomalacia\par \par PFT done on 12/01/20  showed FEV1 = 2.43, 79% of predicted value, FVC = 3.56, 93% of predicted value, PEF = 8.34, 105% of predicted value and DLCO = 23.3, 107% of predicted value.\par

## 2021-01-29 NOTE — END OF VISIT
[FreeTextEntry3] : I, KATIE LEBLANC , am scribing for and in the presence of PAULINE BECK the following sections: history of present illness, past medical/family/surgical/family/social history, review of systems, vital signs, physical exam, and disposition.\par  I reviewed the documentation recorded by the scribe in my presence and it accurately and completely records my words and actions\par

## 2021-02-19 ENCOUNTER — NON-APPOINTMENT (OUTPATIENT)
Age: 61
End: 2021-02-19

## 2021-02-19 ENCOUNTER — OUTPATIENT (OUTPATIENT)
Dept: OUTPATIENT SERVICES | Facility: HOSPITAL | Age: 61
LOS: 1 days | End: 2021-02-19
Payer: COMMERCIAL

## 2021-02-19 VITALS
SYSTOLIC BLOOD PRESSURE: 123 MMHG | RESPIRATION RATE: 17 BRPM | TEMPERATURE: 98 F | HEART RATE: 79 BPM | HEIGHT: 67 IN | DIASTOLIC BLOOD PRESSURE: 90 MMHG | OXYGEN SATURATION: 96 % | WEIGHT: 188.05 LBS

## 2021-02-19 DIAGNOSIS — K21.9 GASTRO-ESOPHAGEAL REFLUX DISEASE WITHOUT ESOPHAGITIS: ICD-10-CM

## 2021-02-19 DIAGNOSIS — G47.33 OBSTRUCTIVE SLEEP APNEA (ADULT) (PEDIATRIC): ICD-10-CM

## 2021-02-19 DIAGNOSIS — Z86.010 PERSONAL HISTORY OF COLONIC POLYPS: ICD-10-CM

## 2021-02-19 DIAGNOSIS — Z98.890 OTHER SPECIFIED POSTPROCEDURAL STATES: Chronic | ICD-10-CM

## 2021-02-19 LAB
ANION GAP SERPL CALC-SCNC: 10 MMOL/L — SIGNIFICANT CHANGE UP (ref 5–17)
BUN SERPL-MCNC: 16 MG/DL — SIGNIFICANT CHANGE UP (ref 7–23)
CALCIUM SERPL-MCNC: 9.1 MG/DL — SIGNIFICANT CHANGE UP (ref 8.4–10.5)
CHLORIDE SERPL-SCNC: 103 MMOL/L — SIGNIFICANT CHANGE UP (ref 96–108)
CO2 SERPL-SCNC: 28 MMOL/L — SIGNIFICANT CHANGE UP (ref 22–31)
CREAT SERPL-MCNC: 1.05 MG/DL — SIGNIFICANT CHANGE UP (ref 0.5–1.3)
GLUCOSE SERPL-MCNC: 91 MG/DL — SIGNIFICANT CHANGE UP (ref 70–99)
HCT VFR BLD CALC: 44 % — SIGNIFICANT CHANGE UP (ref 39–50)
HGB BLD-MCNC: 14.8 G/DL — SIGNIFICANT CHANGE UP (ref 13–17)
MCHC RBC-ENTMCNC: 32.7 PG — SIGNIFICANT CHANGE UP (ref 27–34)
MCHC RBC-ENTMCNC: 33.6 GM/DL — SIGNIFICANT CHANGE UP (ref 32–36)
MCV RBC AUTO: 97.1 FL — SIGNIFICANT CHANGE UP (ref 80–100)
NRBC # BLD: 0 /100 WBCS — SIGNIFICANT CHANGE UP (ref 0–0)
PLATELET # BLD AUTO: 248 K/UL — SIGNIFICANT CHANGE UP (ref 150–400)
POTASSIUM SERPL-MCNC: 4 MMOL/L — SIGNIFICANT CHANGE UP (ref 3.5–5.3)
POTASSIUM SERPL-SCNC: 4 MMOL/L — SIGNIFICANT CHANGE UP (ref 3.5–5.3)
RBC # BLD: 4.53 M/UL — SIGNIFICANT CHANGE UP (ref 4.2–5.8)
RBC # FLD: 12 % — SIGNIFICANT CHANGE UP (ref 10.3–14.5)
SODIUM SERPL-SCNC: 141 MMOL/L — SIGNIFICANT CHANGE UP (ref 135–145)
WBC # BLD: 6.2 K/UL — SIGNIFICANT CHANGE UP (ref 3.8–10.5)
WBC # FLD AUTO: 6.2 K/UL — SIGNIFICANT CHANGE UP (ref 3.8–10.5)

## 2021-02-19 PROCEDURE — 80048 BASIC METABOLIC PNL TOTAL CA: CPT

## 2021-02-19 PROCEDURE — G0463: CPT

## 2021-02-19 PROCEDURE — 85027 COMPLETE CBC AUTOMATED: CPT

## 2021-02-19 RX ORDER — DESLORATADINE AND PSEUDOEPHEDRINE SULFATE 2.5; 12 MG/1; MG/1
1 TABLET, EXTENDED RELEASE ORAL
Qty: 0 | Refills: 0 | DISCHARGE

## 2021-02-19 RX ORDER — LORATADINE 10 MG/1
1 TABLET ORAL
Qty: 0 | Refills: 0 | DISCHARGE

## 2021-02-19 NOTE — H&P PST ADULT - GASTROINTESTINAL COMMENTS
+ heartburn, burning in throat + heartburn, intermittent burning in throat, epigastric burning, improved with Omeprazole

## 2021-02-19 NOTE — H&P PST ADULT - NSICDXPASTSURGICALHX_GEN_ALL_CORE_FT
PAST SURGICAL HISTORY:  H/O arthroscopy of left knee 8 years ago    H/O arthroscopy of right knee 5 years ago

## 2021-02-19 NOTE — H&P PST ADULT - RESPIRATORY AND THORAX COMMENTS
slight dry cough now, much improved since started inhalers slight dry cough now, much improved since pt started inhalers

## 2021-02-19 NOTE — H&P PST ADULT - NSICDXPASTMEDICALHX_GEN_ALL_CORE_FT
PAST MEDICAL HISTORY:  Asthma     Bronchomalacia     Chronic pain of right knee     Gastroesophageal reflux disease without esophagitis     Other tear of medial meniscus, current injury, right knee, initial encounter     Seasonal allergic rhinitis, unspecified trigger

## 2021-02-19 NOTE — H&P PST ADULT - HISTORY OF PRESENT ILLNESS
60 y/o male with history of asthma, bronchomalacia, GERD, and chronic cough presents for presurgical evaluation today.  He reports history of worsening shortness of breath, wheezing and cough last summer for which he saw pulmonologist Dr. Hernandez and started treatment for steroid-dependent asthma.  CT Chest revealed B/L bronchomalacia.  He saw thoracic surgeon Dr. Hernandez for consultation, but surgery was not advised as his symptoms have improved significantly.  He continues to complain of chronic cough.  He is scheduled for EGD Bravo with anesthesia and colonoscopy on 2/24/21.  (Dr. Mcnamara's office notified to correct the booking sheet which does not reflect Colonoscopy to be done, although his last office note and the patient confirm that the colonoscopy will be performed that day.) The patient reports that he feels well and denies any fever, chills, shortness of breath, wheezing, chest pain and any recent illness.     Preop Covid swab scheduled for 2/21/21 on [facility] site.

## 2021-02-19 NOTE — H&P PST ADULT - NSICDXPROBLEM_GEN_ALL_CORE_FT
PROBLEM DIAGNOSES  Problem: PIO (obstructive sleep apnea)  Assessment and Plan: OR booking notified for PIO precautions as intermediate risk identified by screening tool    Problem: Chronic GERD  Assessment and Plan: Pt. is scheduled for EGD Bravo with anesthesia, colonosocpy on 2/24/21.  Preop instructions reviewed, pt verbalized understanding.  Preop Covid swab scheduled for 2/21/21 at [facility] testing site.

## 2021-02-19 NOTE — H&P PST ADULT - NSICDXFAMILYHX_GEN_ALL_CORE_FT
FAMILY HISTORY:  FHx: kidney cancer, mother    Mother  Still living? No  Family history of Alzheimer's disease, Age at diagnosis: Age Unknown

## 2021-02-20 ENCOUNTER — TRANSCRIPTION ENCOUNTER (OUTPATIENT)
Age: 61
End: 2021-02-20

## 2021-02-21 ENCOUNTER — APPOINTMENT (OUTPATIENT)
Dept: DISASTER EMERGENCY | Facility: CLINIC | Age: 61
End: 2021-02-21

## 2021-02-21 DIAGNOSIS — Z01.818 ENCOUNTER FOR OTHER PREPROCEDURAL EXAMINATION: ICD-10-CM

## 2021-02-22 LAB — SARS-COV-2 N GENE NPH QL NAA+PROBE: NOT DETECTED

## 2021-02-24 ENCOUNTER — RESULT REVIEW (OUTPATIENT)
Age: 61
End: 2021-02-24

## 2021-02-24 ENCOUNTER — APPOINTMENT (OUTPATIENT)
Dept: GASTROENTEROLOGY | Facility: HOSPITAL | Age: 61
End: 2021-02-24
Payer: COMMERCIAL

## 2021-02-24 ENCOUNTER — OUTPATIENT (OUTPATIENT)
Dept: OUTPATIENT SERVICES | Facility: HOSPITAL | Age: 61
LOS: 1 days | End: 2021-02-24
Payer: COMMERCIAL

## 2021-02-24 VITALS
TEMPERATURE: 98 F | DIASTOLIC BLOOD PRESSURE: 91 MMHG | OXYGEN SATURATION: 95 % | HEART RATE: 93 BPM | RESPIRATION RATE: 12 BRPM | WEIGHT: 175.05 LBS | HEIGHT: 67 IN | SYSTOLIC BLOOD PRESSURE: 138 MMHG

## 2021-02-24 VITALS
RESPIRATION RATE: 20 BRPM | OXYGEN SATURATION: 98 % | HEART RATE: 77 BPM | SYSTOLIC BLOOD PRESSURE: 110 MMHG | DIASTOLIC BLOOD PRESSURE: 84 MMHG

## 2021-02-24 DIAGNOSIS — Z98.890 OTHER SPECIFIED POSTPROCEDURAL STATES: Chronic | ICD-10-CM

## 2021-02-24 DIAGNOSIS — Z86.010 PERSONAL HISTORY OF COLONIC POLYPS: ICD-10-CM

## 2021-02-24 PROCEDURE — 88305 TISSUE EXAM BY PATHOLOGIST: CPT

## 2021-02-24 PROCEDURE — 43239 EGD BIOPSY SINGLE/MULTIPLE: CPT

## 2021-02-24 PROCEDURE — 45380 COLONOSCOPY AND BIOPSY: CPT | Mod: 59

## 2021-02-24 PROCEDURE — 45385 COLONOSCOPY W/LESION REMOVAL: CPT | Mod: PT

## 2021-02-24 PROCEDURE — 45385 COLONOSCOPY W/LESION REMOVAL: CPT

## 2021-02-24 PROCEDURE — 88305 TISSUE EXAM BY PATHOLOGIST: CPT | Mod: 26

## 2021-02-24 RX ORDER — SODIUM CHLORIDE 9 MG/ML
500 INJECTION INTRAMUSCULAR; INTRAVENOUS; SUBCUTANEOUS
Refills: 0 | Status: COMPLETED | OUTPATIENT
Start: 2021-02-24 | End: 2021-02-24

## 2021-02-24 RX ADMIN — SODIUM CHLORIDE 30 MILLILITER(S): 9 INJECTION INTRAMUSCULAR; INTRAVENOUS; SUBCUTANEOUS at 13:44

## 2021-02-24 NOTE — ASU PATIENT PROFILE, ADULT - PMH
Asthma    Bronchomalacia    Chronic pain of right knee    Gastroesophageal reflux disease without esophagitis    Other tear of medial meniscus, current injury, right knee, initial encounter    Seasonal allergic rhinitis, unspecified trigger

## 2021-02-26 LAB — SURGICAL PATHOLOGY STUDY: SIGNIFICANT CHANGE UP

## 2021-02-26 PROCEDURE — 91035 G-ESOPH REFLX TST W/ELECTROD: CPT | Mod: 26

## 2021-03-01 ENCOUNTER — NON-APPOINTMENT (OUTPATIENT)
Age: 61
End: 2021-03-01

## 2021-03-10 ENCOUNTER — NON-APPOINTMENT (OUTPATIENT)
Age: 61
End: 2021-03-10

## 2021-04-15 ENCOUNTER — APPOINTMENT (OUTPATIENT)
Dept: THORACIC SURGERY | Facility: CLINIC | Age: 61
End: 2021-04-15
Payer: COMMERCIAL

## 2021-04-20 ENCOUNTER — APPOINTMENT (OUTPATIENT)
Dept: PULMONOLOGY | Facility: CLINIC | Age: 61
End: 2021-04-20
Payer: COMMERCIAL

## 2021-04-20 ENCOUNTER — NON-APPOINTMENT (OUTPATIENT)
Age: 61
End: 2021-04-20

## 2021-04-20 VITALS
OXYGEN SATURATION: 97 % | HEIGHT: 66 IN | SYSTOLIC BLOOD PRESSURE: 114 MMHG | DIASTOLIC BLOOD PRESSURE: 84 MMHG | BODY MASS INDEX: 29.25 KG/M2 | WEIGHT: 182 LBS | HEART RATE: 82 BPM | TEMPERATURE: 97.2 F | RESPIRATION RATE: 16 BRPM

## 2021-04-20 PROCEDURE — 99072 ADDL SUPL MATRL&STAF TM PHE: CPT

## 2021-04-20 PROCEDURE — 99214 OFFICE O/P EST MOD 30 MIN: CPT | Mod: 25

## 2021-04-20 PROCEDURE — 95012 NITRIC OXIDE EXP GAS DETER: CPT

## 2021-04-20 PROCEDURE — 94010 BREATHING CAPACITY TEST: CPT

## 2021-04-20 NOTE — PROCEDURE
[FreeTextEntry1] : PFT revealed normal flows, with a FEV1 of 3.11L, which is 103% of predicted, with a normal flow volume loop \par \par FENO was 25; a normal value being less than 25\par Fractional exhaled nitric oxide (FENO) is regarded as a simple, noninvasive method for assessing eosinophilic airway inflammation. Produced by a variety of cells within the lung, nitric oxide (NO) concentrations are generally low in healthy individuals. However, high concentrations of NO appear to be involved in nonspecific host defense mechanisms and chronic inflammatory diseases such as asthma. The American Thoracic Society (ATS) therefore has recommended using FENO to aid in the diagnosis and monitoring of eosinophilic airway inflammation and asthma, and for identifying steroid responsive individuals whose chronic respiratory symptoms may be caused by airway inflammation.

## 2021-04-20 NOTE — ASSESSMENT
[FreeTextEntry1] : Mr. WHITE is a 61 year old male with a history of originally from [place], cervical facet syndrome, deviated septum, chronic sinus issues, herniated disk, GERD, allergies, HLD, eosinophilic asthma, elevated IgE, allergy, GERD, bronchomalacia who comes into the office today for pulmonary evaluation for chronic cough, wheezing, and SOB - improved\par \par The patient's shortness of breath is multifactorial due to:\par -pulmonary disease \par      -severe persistent asthma\par      -eosinophilic asthma\par      -steroid dependent asthma\par      -Tracheomalacia\par -poor breathing mechanics \par -overweight/out of shape\par -?cardiac disease (doubt)\par \par Problem 1: Severe Persistent Asthma (improved)\par -Complete Full PFTs and NiOx when available\par -continue Singulair 10 mg before bed \par -discontinue Anoro Ellipta at 1 inhalation daily\par -discontinue Alvesco (160) 2 inhalations BID\par -add Trelegy 200 1 inhalation QD\par -continue Ventolin rescue inhaler 2 inhalations before exercise, Q6H \par \par -Asthma is believed to be caused by inherited (genetic) and environmental factor, but its exact cause is unknown. Asthma may be triggered by allergens, lung infections, or irritants in the air. Asthma triggers are different for each person\par -Inhaler technique reviewed as well as oral hygiene techniques reviewed with patient. Avoidance of cold air, extremes of temperature, rescue inhaler should be used before exercise. Order of medication reviewed with patient. Recommended use of a cool mist humidifier in the bedroom.\par \par Problem 2: Steroid Dependent Asthma\par -Patient is a candidate for Dupixent. S/p 3rd shot of Dupixent 12/2020\par -Dupixent is a prescription medicine used with other asthma medicines for the maintenance treatment of moderate-to-severe asthma in people aged 12 years and older whose asthma is not controlled with their current asthma medicines. Dupixent helps prevent severe asthma attacks (exacerbations) and can improve your breathing. Dupixent may also help reduce the amount of oral corticosteroids you need while preventing severe asthma attacks and improving your breathing. Dupixent is not used to treat sudden breathing problems. Risks and side effect of Dupixent were discussed and reviewed with patient.\par \par Problem 3: Eosinophilic asthma\par -Patient is a candidate for Nucala or Fasenra\par -The safety and efficacy of Nucala was established in three double-blind, randomized, placebo-controlled trials in patients with severe asthma. Compared to a placebo, patients with severe asthma receiving Nucala had fewer exacerbation requiring hospitalization and/or emergency department visits, and a longer time to first exacerbation. In addition, patients with severe asthma receiving Nucala or Fasenra experienced greater reductions in their daily maintenance oral corticosteroid dose, while maintaining asthma control compared with patients receiving placebo. Treatment with Nucala did not result in a significant improvement in lung function, as measured by the volume of air exhaled by patients in one second. The most common side effects include: headache, injection site reactions, back pain, weakness, and fatigue; hypersensitivity reactions can occur within hours or days including swelling of the face, mouth, and tongue, fainting, dizziness, hives, breathing problems, and rash; herpes zoster infections have occurred. The drug is a monoclonal antibody that inhibits interleukin-5 which helps regular eosinophils, a type of white blood cell that contributes to asthma. The over-production of eosinophils can cause inflammation in the lungs, increasing the frequency of asthma attacks. Patients must also take other medications, including high dose inhaled corticosteroids and at least one additional asthma drug.\par \par Problem 4: Chronic Allergy / Sinus\par -continue Xhance 1 sniff BID \par -continue Olopatadine 0.6% at 1 sniff/nostril BID \par -continue Xyzal 5 mg qHS \par Environmental measures for allergies were encouraged including mattress and pillow cover, air purifier, and environmental controls. \par \par Problem 5: Bronchomalacia\par -s/p dynamic Ct of the chest with Dr. Hernandez\par Tracheomalacia is usually acquired in adults and common causes include damage by tracheostomy or endotracheal intubation damaging the tracheal cartilage with increase risk with multiple intubations, prolonged intubation, and concurrent high dose steroid therapy; external chest wall trauma and surgery; chronic compression of the trachea by benign etiologies (eg, benign mediastinal goiter) or malignancy; relapsing polychondritis; or recurrent infection. Tracheomalacia can be asymptomatic, however signs or symptoms can develop as the severity of the airway narrowing progresses with major symptoms include dyspnea, cough, and sputum retention. Other symptoms include severe paroxysms of coughing, wheezing or stridor, barking cough and may be exacerbated by forced expiration, cough, and valsalva maneuver. Tracheomalacia is diagnosed by a bronchoscopic visualization of dynamic airway collapse on dynamic chest CT. Therapy is warranted in symptomatic patients with severe tracheomalacia and includes surgical repair as tracheobronchoplasty. The patient was referred to Dr. Zaid Hernandez or Dr. Allen Fuentes, at [facility] for a surgical consult. \par \par Problem 6: GERD\par -continue Protonix 40 mg before breakfast \par -Rule of 2s: avoid eating too much, eating too fast, eating too late, eating too spicy, eating too lousy, eating two hours before bed.\par -Things to avoid including overeating, spicy foods, tight clothing, eating within three hours of bed, this list is not all inclusive. \par -For treatment of reflux, possible options discussed including diet control, H2 blockers, PPIs, as well as coating motility agents discussed as treatment options. Timing of meals and proximity of last meal to sleep were discussed. If symptoms persist, a formal gastrointestinal evaluation is needed.\par \par Problem 7: R/o PIO (? present)\par -Recommended to complete a home sleep study due to elevated MP class, snoring, large neck size\par Sleep apnea is associated with adverse clinical consequences which an affect most organ systems. Cardiovascular disease risk includes arrhythmias, atrial fibrillation, hypertension, coronary artery disease, and stroke. Metabolic disorders include diabetes type 2, non-alcoholic fatty liver disease. Mood disorder especially depression; and cognitive decline especially in the elderly. Associations with chronic reflux/Guerrero’s esophagus some but not all inclusive. \par -Reasons include arousal consistent with hypopnea; respiratory events most prominent in REM sleep or supine position; therefore sleep staging and body position are important for accurate diagnosis and estimation of AHI. \par \par Problem 8: Poor Mechanics of Breathing\par - Proper breathing techniques were reviewed with an emphasis of exhalation. Patient instructed to breath in for 1 second and out for four seconds. Patient was encouraged to not talk while walking. \par \par Problem 9: Overweight (improved) \par -Weight loss, exercise, and diet control were discussed and are highly encouraged. Treatment options were given such as, aqua therapy, and contacting a nutritionist. Recommended to use the elliptical, stationary bike, less use of treadmill. Mindful eating was explained to the patient Obesity is associated with worsening asthma, shortness of breath, and potential for cardiac disease, diabetes, and other underlying medical conditions. \par \par Problem 10: Cardiac\par -recommended to follow up with a cardiologist\par \par Problem 11: health maintenance\par -s/p COVID 19 vaccine Moderna x 2\par -s/p influenza vaccine 2020\par -recommended strep pneumonia vaccines after age 65: Prevnar-13 vaccine, followed by Pneumo vaccine 23 one year following\par -recommended early intervention for URIs\par -recommended regular osteoporosis evaluations\par -recommended early dermatological evaluations\par -recommended after the age of 50 to the age of 70, colonoscopy every 5 years \par \par  Follow up in 6-8 weeks\par -he  is recommended to call with any changes, questions, or concerns.

## 2021-04-20 NOTE — ADDENDUM
[FreeTextEntry1] : Documented by Rambo White acting as a scribe for Dr. Zeyad Hernandez on 04/20/2021.\par \par All medical record entries made by the Scribe were at my, Dr. Zeyad Hernandez's, direction and personally dictated by me on 04/20/2021 . I have reviewed the chart and agree that the record accurately reflects my personal performance of the history, physical exam, assessment and plan. I have also personally directed, reviewed, and agree with the discharge instructions. \par

## 2021-04-20 NOTE — PHYSICAL EXAM
[No Acute Distress] : no acute distress [Normal Oropharynx] : normal oropharynx [III] : Mallampati Class: III [Normal Appearance] : normal appearance [No Neck Mass] : no neck mass [Normal Rate/Rhythm] : normal rate/rhythm [Normal S1, S2] : normal s1, s2 [No Murmurs] : no murmurs [No Resp Distress] : no resp distress [Clear to Auscultation Bilaterally] : clear to auscultation bilaterally [No Abnormalities] : no abnormalities [Benign] : benign [Normal Gait] : normal gait [No Clubbing] : no clubbing [No Cyanosis] : no cyanosis [No Edema] : no edema [FROM] : FROM [Normal Color/ Pigmentation] : normal color/ pigmentation [No Focal Deficits] : no focal deficits [Oriented x3] : oriented x3 [Normal Affect] : normal affect [TextBox_68] : I:E ratio 1:3; clear

## 2021-04-20 NOTE — HISTORY OF PRESENT ILLNESS
[TextBox_4] : Mr. WHITE is a 61 year old male with a history of allergies, asthma, chronic cough, eosinophilic asthma, GERD, pulmonary nodules, snoring, SOB, bronchomalacia, and wheezing presenting to the office today for follow up pulmonary evaluation. His chief complaint is \par \par -he notes generally feeling well\par -he notes regular exercise cycling at least 50 minutes daily\par -he notes energy levels high\par -he notes sleep quality good\par -he denies wheeze\par -he notes mild intermittent cough triggered by seasonal allergies\par -he denies SOB\par -he denies ankle swelling \par -he denies snore\par -he notes regular bowel movements \par \par -denies any chest pain, chest pressure, diarrhea, constipation, dysphagia, sour taste in the mouth, dizziness, leg swelling, leg pain, myalgias, arthralgias, itchy eyes, itchy ears, heartburn, or reflux.\par \par

## 2021-04-30 ENCOUNTER — NON-APPOINTMENT (OUTPATIENT)
Age: 61
End: 2021-04-30

## 2021-05-07 ENCOUNTER — APPOINTMENT (OUTPATIENT)
Dept: THORACIC SURGERY | Facility: CLINIC | Age: 61
End: 2021-05-07
Payer: COMMERCIAL

## 2021-05-07 PROCEDURE — 99213 OFFICE O/P EST LOW 20 MIN: CPT | Mod: 95

## 2021-05-07 NOTE — PHYSICAL EXAM

## 2021-05-13 NOTE — HISTORY OF PRESENT ILLNESS
[Home] : at home, [unfilled] , at the time of the visit. [Medical Office: ([facility])___] : at the medical office located in  [Verbal consent obtained from patient] : the patient, [unfilled] [FreeTextEntry1] : 62 y/o male, nonsmoker with a PMH of chronic cough, chronic sinus issues, cervical facet syndrome, deviated septum, chronic sinus issues, herniated disk, GERD, allergies, HLD, and TBM. He presents today to review BRAVO study. He is referred by Dr. Hernandez.\par \par Dynamic CT chest completed on 12/09/20:\par -findings consistent with bronchomalacia\par \par PFT done on 12/01/20 showed FEV1 = 2.43, 79% of predicted value, FVC = 3.56, 93% of predicted value, PEF = 8.34, 105% of predicted value and DLCO = 23.3, 107% of predicted value.\par \par EGD/ colonscopy completed on 2/24/21:\par - gastritis and duodenitis- path benign\par \par \par BRAVO study completed on 2/24/21:\par - DeMeester score 36.5\par - positive pH bravo study\par symptom associate prob: 79.3\par \par

## 2021-05-13 NOTE — ASSESSMENT
[FreeTextEntry1] : 60 y/o male, nonsmoker with a PMH of chronic cough, chronic sinus issues, cervical facet syndrome, deviated septum, chronic sinus issues, herniated disk, GERD, allergies, HLD, and TBM. He presents today to review BRAVO study. He is referred by Dr. Hernandez.\par \par Today the patient reports feeling generally well. He admits to being prescribed medication by Dr. Hernandez that has improved his cough and is no longer wheezing. \par \par Bravo study was reviewed from February was reviewed and with evidence of reflux. Patient is with pathologic reflux, however 99% better while on medications. I explained that coughing and reflux are usually tired together and if he cough reoccurs then we will discuss fixing his reflux. I advised him to follow up with his GI, Dr. Ramos. \par \par Will plan to repeat a telehealth visit in 6 months if symptoms occur sooner then will speak sooner. \par \par Plan:\par 1. Repeat a telehealth visit in 6 months if symptoms occur sooner then will speak sooner. \par \par I, GEN ZIMMER , am scribing for and in the presence of [Dr. Zaid Hernandez] the following sections: History of present illness, past Medical/family/surgical/family/social history, review of systems, vital signs, physical exam and disposition.\par  \par I reviewed the documentation recorded by the scribe in my presence and it accurately and completely records my words and actions\par \par

## 2021-05-17 ENCOUNTER — RX RENEWAL (OUTPATIENT)
Age: 61
End: 2021-05-17

## 2021-06-23 ENCOUNTER — TRANSCRIPTION ENCOUNTER (OUTPATIENT)
Age: 61
End: 2021-06-23

## 2021-08-19 ENCOUNTER — TRANSCRIPTION ENCOUNTER (OUTPATIENT)
Age: 61
End: 2021-08-19

## 2021-08-23 ENCOUNTER — APPOINTMENT (OUTPATIENT)
Dept: PULMONOLOGY | Facility: CLINIC | Age: 61
End: 2021-08-23
Payer: COMMERCIAL

## 2021-08-23 VITALS
SYSTOLIC BLOOD PRESSURE: 124 MMHG | OXYGEN SATURATION: 95 % | DIASTOLIC BLOOD PRESSURE: 84 MMHG | HEART RATE: 87 BPM | TEMPERATURE: 97.3 F | HEIGHT: 65 IN | RESPIRATION RATE: 16 BRPM | WEIGHT: 177 LBS | BODY MASS INDEX: 29.49 KG/M2

## 2021-08-23 PROCEDURE — 95012 NITRIC OXIDE EXP GAS DETER: CPT

## 2021-08-23 PROCEDURE — 94727 GAS DIL/WSHOT DETER LNG VOL: CPT

## 2021-08-23 PROCEDURE — ZZZZZ: CPT

## 2021-08-23 PROCEDURE — 94729 DIFFUSING CAPACITY: CPT

## 2021-08-23 PROCEDURE — 94010 BREATHING CAPACITY TEST: CPT

## 2021-08-23 PROCEDURE — 99214 OFFICE O/P EST MOD 30 MIN: CPT | Mod: 25

## 2021-08-23 NOTE — PHYSICAL EXAM
[No Acute Distress] : no acute distress [Normal Oropharynx] : normal oropharynx [III] : Mallampati Class: III [Normal Appearance] : normal appearance [No Neck Mass] : no neck mass [Normal Rate/Rhythm] : normal rate/rhythm [Normal S1, S2] : normal s1, s2 [No Murmurs] : no murmurs [No Resp Distress] : no resp distress [Clear to Auscultation Bilaterally] : clear to auscultation bilaterally [No Abnormalities] : no abnormalities [Normal Gait] : normal gait [Benign] : benign [No Clubbing] : no clubbing [No Cyanosis] : no cyanosis [No Edema] : no edema [FROM] : FROM [Normal Color/ Pigmentation] : normal color/ pigmentation [No Focal Deficits] : no focal deficits [Oriented x3] : oriented x3 [Normal Affect] : normal affect [TextBox_68] : I:E ratio 1:3; clear

## 2021-08-23 NOTE — PROCEDURE
[FreeTextEntry1] : Full PFT revealed normal flows, with a FEV1 of 2.87L, which is 94% of predicted, normal lung volumes, and a diffusion of 27.2, which is 122% of predicted, with a normal flow volume loop \par \par Feno was 20; a normal value being less than 25. Fractional exhaled nitric oxide (FENO) is regarded as a simple, noninvasive method for assessing eosinophilic airway inflammation. Produced by a variety of cells within the lung, nitric oxide (NO) concentrations are generally low in healthy individuals. However, high concentrations of NO appear to be involved in nonspecific host defense mechanisms and chronic inflammatory  diseases such as asthma. The American Thoracic Society (ATS) therefore recommended using FENO to aid in the diagnosis and monitoring of eosinophilic airway inflammation and asthma, and for identifying steroid responsive individuals whose chronic respiratory symptoms may be caused by airway inflammation

## 2021-08-23 NOTE — ASSESSMENT
[FreeTextEntry1] : Mr. WHITE is a 61 year old male with a history of originally from [place], cervical facet syndrome, deviated septum, chronic sinus issues, herniated disk, GERD, allergies, HLD, eosinophilic asthma, elevated IgE, allergy, GERD, bronchomalacia who comes into the office today for pulmonary evaluation for chronic cough, wheezing, and SOB - improved\par \par The patient's shortness of breath is multifactorial due to:\par -pulmonary disease \par      -severe persistent asthma\par      -eosinophilic asthma\par      -steroid dependent asthma\par      -Tracheomalacia\par -poor breathing mechanics \par -overweight/out of shape\par -?cardiac disease (doubt)\par \par Problem 1: Severe Persistent Asthma (improved)\par -continue Singulair 10 mg before bed \par -change Trelegy 200 1 inhalation QD to Breo Ellipta 200 at 1 inhalation QD\par -continue Ventolin rescue inhaler 2 inhalations before exercise, Q6H \par \par -Asthma is believed to be caused by inherited (genetic) and environmental factor, but its exact cause is unknown. Asthma may be triggered by allergens, lung infections, or irritants in the air. Asthma triggers are different for each person\par -Inhaler technique reviewed as well as oral hygiene techniques reviewed with patient. Avoidance of cold air, extremes of temperature, rescue inhaler should be used before exercise. Order of medication reviewed with patient. Recommended use of a cool mist humidifier in the bedroom.\par \par Problem 2: Steroid Dependent Asthma\par -Patient is a candidate for Dupixent. S/p 3rd shot of Dupixent 12/2020\par -Dupixent is a prescription medicine used with other asthma medicines for the maintenance treatment of moderate-to-severe asthma in people aged 12 years and older whose asthma is not controlled with their current asthma medicines. Dupixent helps prevent severe asthma attacks (exacerbations) and can improve your breathing. Dupixent may also help reduce the amount of oral corticosteroids you need while preventing severe asthma attacks and improving your breathing. Dupixent is not used to treat sudden breathing problems. Risks and side effect of Dupixent were discussed and reviewed with patient.\par \par Problem 3: Eosinophilic asthma\par -Patient is a candidate for Nucala or Fasenra\par -The safety and efficacy of Nucala was established in three double-blind, randomized, placebo-controlled trials in patients with severe asthma. Compared to a placebo, patients with severe asthma receiving Nucala had fewer exacerbation requiring hospitalization and/or emergency department visits, and a longer time to first exacerbation. In addition, patients with severe asthma receiving Nucala or Fasenra experienced greater reductions in their daily maintenance oral corticosteroid dose, while maintaining asthma control compared with patients receiving placebo. Treatment with Nucala did not result in a significant improvement in lung function, as measured by the volume of air exhaled by patients in one second. The most common side effects include: headache, injection site reactions, back pain, weakness, and fatigue; hypersensitivity reactions can occur within hours or days including swelling of the face, mouth, and tongue, fainting, dizziness, hives, breathing problems, and rash; herpes zoster infections have occurred. The drug is a monoclonal antibody that inhibits interleukin-5 which helps regular eosinophils, a type of white blood cell that contributes to asthma. The over-production of eosinophils can cause inflammation in the lungs, increasing the frequency of asthma attacks. Patients must also take other medications, including high dose inhaled corticosteroids and at least one additional asthma drug.\par \par Problem 4: Chronic Allergy / Sinus\par -continue Xhance 1 sniff BID - move to once per day\par -continue Olopatadine 0.6% at 1 sniff/nostril BID \par -continue Xyzal 5 mg qHS \par Environmental measures for allergies were encouraged including mattress and pillow cover, air purifier, and environmental controls. \par \par Problem 5: Bronchomalacia\par -s/p dynamic Ct of the chest with Dr. Hernandez\par Tracheomalacia is usually acquired in adults and common causes include damage by tracheostomy or endotracheal intubation damaging the tracheal cartilage with increase risk with multiple intubations, prolonged intubation, and concurrent high dose steroid therapy; external chest wall trauma and surgery; chronic compression of the trachea by benign etiologies (eg, benign mediastinal goiter) or malignancy; relapsing polychondritis; or recurrent infection. Tracheomalacia can be asymptomatic, however signs or symptoms can develop as the severity of the airway narrowing progresses with major symptoms include dyspnea, cough, and sputum retention. Other symptoms include severe paroxysms of coughing, wheezing or stridor, barking cough and may be exacerbated by forced expiration, cough, and valsalva maneuver. Tracheomalacia is diagnosed by a bronchoscopic visualization of dynamic airway collapse on dynamic chest CT. Therapy is warranted in symptomatic patients with severe tracheomalacia and includes surgical repair as tracheobronchoplasty. The patient was referred to Dr. Zaid Hernandez or Dr. Allen Fuentes, at [facility] for a surgical consult. \par \par Problem 6: GERD\par -continue Protonix 40 mg before breakfast \par -Rule of 2s: avoid eating too much, eating too fast, eating too late, eating too spicy, eating too lousy, eating two hours before bed.\par -Things to avoid including overeating, spicy foods, tight clothing, eating within three hours of bed, this list is not all inclusive. \par -For treatment of reflux, possible options discussed including diet control, H2 blockers, PPIs, as well as coating motility agents discussed as treatment options. Timing of meals and proximity of last meal to sleep were discussed. If symptoms persist, a formal gastrointestinal evaluation is needed.\par \par Problem 7: R/o PIO (? present)\par -Recommended to complete a home sleep study due to elevated MP class, snoring, large neck size\par Sleep apnea is associated with adverse clinical consequences which an affect most organ systems. Cardiovascular disease risk includes arrhythmias, atrial fibrillation, hypertension, coronary artery disease, and stroke. Metabolic disorders include diabetes type 2, non-alcoholic fatty liver disease. Mood disorder especially depression; and cognitive decline especially in the elderly. Associations with chronic reflux/Guerrero’s esophagus some but not all inclusive. \par -Reasons include arousal consistent with hypopnea; respiratory events most prominent in REM sleep or supine position; therefore sleep staging and body position are important for accurate diagnosis and estimation of AHI. \par \par Problem 8: Poor Mechanics of Breathing\par - Proper breathing techniques were reviewed with an emphasis of exhalation. Patient instructed to breath in for 1 second and out for four seconds. Patient was encouraged to not talk while walking. \par \par Problem 9: Overweight (improved) \par -Weight loss, exercise, and diet control were discussed and are highly encouraged. Treatment options were given such as, aqua therapy, and contacting a nutritionist. Recommended to use the elliptical, stationary bike, less use of treadmill. Mindful eating was explained to the patient Obesity is associated with worsening asthma, shortness of breath, and potential for cardiac disease, diabetes, and other underlying medical conditions. \par \par Problem 10: Cardiac\par -recommended to follow up with a cardiologist\par \par Problem 11: health maintenance\par -s/p COVID 19 vaccine Moderna x 2\par -s/p influenza vaccine 2020\par -recommended strep pneumonia vaccines after age 65: Prevnar-13 vaccine, followed by Pneumo vaccine 23 one year following\par -recommended early intervention for URIs\par -recommended regular osteoporosis evaluations\par -recommended early dermatological evaluations\par -recommended after the age of 50 to the age of 70, colonoscopy every 5 years \par \par  Follow up in 6-8 weeks\par -he  is recommended to call with any changes, questions, or concerns.

## 2021-08-23 NOTE — ADDENDUM
[FreeTextEntry1] : Documented by Baljinder Rodriguez acting as a scribe for Dr. Zeyad Hernandez on (08/23/2021).\par \par All medical record entries made by the Scribe were at my, Dr. Zeyad Hernandez's, direction and personally dictated by me on (08/23/2021). I have reviewed the chart and agree that the record accurately reflects my personal performance of the history, physical exam, assessment and plan. I have also personally directed, reviewed, and agree with the discharge instructions.\par

## 2021-08-23 NOTE — HISTORY OF PRESENT ILLNESS
[TextBox_4] : Mr. WHITE is a 61 year old male with a history of allergies, asthma, chronic cough, eosinophilic asthma, GERD, pulmonary nodules, snoring, SOB, bronchomalacia, and wheezing presenting to the office today for follow up pulmonary evaluation. His chief complaint is \par -he notes feeling well in general\par -he notes mild PND once in a while\par -he notes getting 7 hours of sleep per night which is enough for him\par -he notes biking for exercise\par -he notes he has a torn rotator cuff \par -he notes he can pick his arm up but feels it when he lifts a certain weight\par -he notes snoring mostly when he is on his back\par -he notes heartburn once in a while depending on what he eats\par -he notes wanting to lose some weight\par -he notes he just went to [place] for a few days\par -he notes taking the Trelegy and Xhance (BID)\par -he notes his insurance does not cover Trelegy \par \par patient denies any headaches, nausea, vomiting, fever, chills, sweats, chest pain, chest pressure, palpitations, coughing, wheezing, fatigue, diarrhea, constipation, dysphagia, dizziness, leg swelling, leg pain, itchy eyes, itchy ears, or sour taste in the mouth

## 2021-09-03 ENCOUNTER — RX RENEWAL (OUTPATIENT)
Age: 61
End: 2021-09-03

## 2021-10-04 ENCOUNTER — RX RENEWAL (OUTPATIENT)
Age: 61
End: 2021-10-04

## 2021-12-22 ENCOUNTER — APPOINTMENT (OUTPATIENT)
Dept: PULMONOLOGY | Facility: CLINIC | Age: 61
End: 2021-12-22
Payer: COMMERCIAL

## 2021-12-22 ENCOUNTER — NON-APPOINTMENT (OUTPATIENT)
Age: 61
End: 2021-12-22

## 2021-12-22 VITALS
OXYGEN SATURATION: 97 % | HEIGHT: 67 IN | HEART RATE: 63 BPM | DIASTOLIC BLOOD PRESSURE: 74 MMHG | SYSTOLIC BLOOD PRESSURE: 112 MMHG | TEMPERATURE: 97.1 F | WEIGHT: 168 LBS | BODY MASS INDEX: 26.37 KG/M2 | RESPIRATION RATE: 16 BRPM

## 2021-12-22 PROCEDURE — 95012 NITRIC OXIDE EXP GAS DETER: CPT

## 2021-12-22 PROCEDURE — 99214 OFFICE O/P EST MOD 30 MIN: CPT | Mod: 25

## 2021-12-22 PROCEDURE — 94010 BREATHING CAPACITY TEST: CPT

## 2021-12-22 RX ORDER — UMECLIDINIUM BROMIDE AND VILANTEROL TRIFENATATE 62.5; 25 UG/1; UG/1
62.5-25 POWDER RESPIRATORY (INHALATION) DAILY
Qty: 3 | Refills: 1 | Status: DISCONTINUED | COMMUNITY
Start: 2020-12-09 | End: 2021-12-22

## 2021-12-22 RX ORDER — CICLESONIDE 160 UG/1
160 AEROSOL, METERED RESPIRATORY (INHALATION) TWICE DAILY
Qty: 3 | Refills: 3 | Status: DISCONTINUED | COMMUNITY
Start: 2020-12-01 | End: 2021-12-22

## 2021-12-22 NOTE — HISTORY OF PRESENT ILLNESS
[TextBox_4] : Mr. WHITE is a 61 year old male with a history of allergies, asthma, chronic cough, eosinophilic asthma, GERD, pulmonary nodules, snoring, SOB, bronchomalacia, and wheezing presenting to the office today for follow up pulmonary evaluation. His chief complaint is \par -he notes feeling well in general\par -he notes his energy level is good (8-9/10)\par -he notes losing 13 lbs through diet and exercise and is feeling much better\par -he notes exercising on the treadmill\par -he notes about a week ago he has been feeling a pain in his stomach that feels like it surrounds it like a belt\par -he notes the pain happens 2-3 times a day\par -he notes he is still trying to figure out what is causing the pain\par -he notes his bowels are regular\par -he notes he is getting enough sleep\par -he denies any visual issues \par -s/p COVID-19 vaccine x3\par -no new medications, vitamins, or supplements \par -he wants to know why he should stay on his Rx\par -he notes he is taking Dupixent (for one year)\par \par -patient denies any headaches, nausea, vomiting, fever, chills, sweats, chest pain, chest pressure, palpitations, coughing, wheezing, fatigue, diarrhea, constipation, dysphagia, myalgias, dizziness, leg swelling, leg pain, itchy eyes, itchy ears, heartburn, reflux or sour taste in the mouth

## 2021-12-22 NOTE — PROCEDURE
[FreeTextEntry1] : PFT revealed normal flows, with a FEV1 of 3.28L, which is 100% of predicted, with a normal flow volume loop \par \par FENO was 16; a normal value being less than 25. Fractional exhaled nitric oxide (FENO) is regarded as a simple, noninvasive method for assessing eosinophilic airway inflammation. Produced by a variety of cells within the lung, nitric oxide (NO) concentrations are generally low in healthy individuals. However, high concentrations of NO appear to be involved in nonspecific host defense mechanisms and chronic inflammatory  diseases such as asthma. The American Thoracic Society (ATS) therefore recommended using FENO to aid in the diagnosis and monitoring of eosinophilic airway inflammation and asthma, and for identifying steroid responsive individuals whose chronic respiratory symptoms may be caused by airway inflammation \par \par -Images and procedures reviewed in detail and discussed with patient.

## 2021-12-22 NOTE — ASSESSMENT
[FreeTextEntry1] : Mr. WHITE is a 61 year old male with a history of originally from [place], cervical facet syndrome, deviated septum, chronic sinus issues, herniated disk, GERD, allergies, HLD, eosinophilic asthma, elevated IgE, allergy, GERD, bronchomalacia who comes into the office today for pulmonary evaluation for chronic cough, wheezing, and SOB - improved (at baseline) - especially weight loss\par \par The patient's shortness of breath is multifactorial due to:\par -pulmonary disease \par      -severe persistent asthma\par      -eosinophilic asthma\par      -steroid dependent asthma\par      -Tracheomalacia\par -poor breathing mechanics \par -overweight/out of shape\par -?cardiac disease (doubt)\par \par Problem 1: Severe Persistent Asthma (improved)\par -continue Singulair 10 mg before bed \par -continue Trelegy 200 1 inhalation QD or Breo Ellipta 200 at 1 inhalation QD (whichever insurance allows0\par -continue Ventolin rescue inhaler 2 inhalations before exercise, Q6H \par -Asthma is believed to be caused by inherited (genetic) and environmental factor, but its exact cause is unknown. Asthma may be triggered by allergens, lung infections, or irritants in the air. Asthma triggers are different for each person\par -Inhaler technique reviewed as well as oral hygiene techniques reviewed with patient. Avoidance of cold air, extremes of temperature, rescue inhaler should be used before exercise. Order of medication reviewed with patient. Recommended use of a cool mist humidifier in the bedroom.\par \par Problem 2: Steroid Dependent Asthma (controlled)\par -Patient is a candidate for Dupixent. S/p 3rd shot of Dupixent 12/2020 - move to [id number]\par -Dupixent is a prescription medicine used with other asthma medicines for the maintenance treatment of moderate-to-severe asthma in people aged 12 years and older whose asthma is not controlled with their current asthma medicines. Dupixent helps prevent severe asthma attacks (exacerbations) and can improve your breathing. Dupixent may also help reduce the amount of oral corticosteroids you need while preventing severe asthma attacks and improving your breathing. Dupixent is not used to treat sudden breathing problems. Risks and side effect of Dupixent were discussed and reviewed with patient.\par \par Problem 3: Eosinophilic asthma\par -Patient is a candidate for Nucala or Fasenra\par -The safety and efficacy of Nucala was established in three double-blind, randomized, placebo-controlled trials in patients with severe asthma. Compared to a placebo, patients with severe asthma receiving Nucala had fewer exacerbation requiring hospitalization and/or emergency department visits, and a longer time to first exacerbation. In addition, patients with severe asthma receiving Nucala or Fasenra experienced greater reductions in their daily maintenance oral corticosteroid dose, while maintaining asthma control compared with patients receiving placebo. Treatment with Nucala did not result in a significant improvement in lung function, as measured by the volume of air exhaled by patients in one second. The most common side effects include: headache, injection site reactions, back pain, weakness, and fatigue; hypersensitivity reactions can occur within hours or days including swelling of the face, mouth, and tongue, fainting, dizziness, hives, breathing problems, and rash; herpes zoster infections have occurred. The drug is a monoclonal antibody that inhibits interleukin-5 which helps regular eosinophils, a type of white blood cell that contributes to asthma. The over-production of eosinophils can cause inflammation in the lungs, increasing the frequency of asthma attacks. Patients must also take other medications, including high dose inhaled corticosteroids and at least one additional asthma drug.\par \par Problem 4: Chronic Allergy / Sinus\par -continue Xhance 1 sniff BID - move to once per day\par -continue Olopatadine 0.6% at 1 sniff/nostril BID \par -continue Xyzal 5 mg qHS \par Environmental measures for allergies were encouraged including mattress and pillow cover, air purifier, and environmental controls. \par \par Problem 5: Bronchomalacia (quiet)\par -s/p dynamic Ct of the chest with Dr. Hernandez\par Tracheomalacia is usually acquired in adults and common causes include damage by tracheostomy or endotracheal intubation damaging the tracheal cartilage with increase risk with multiple intubations, prolonged intubation, and concurrent high dose steroid therapy; external chest wall trauma and surgery; chronic compression of the trachea by benign etiologies (eg, benign mediastinal goiter) or malignancy; relapsing polychondritis; or recurrent infection. Tracheomalacia can be asymptomatic, however signs or symptoms can develop as the severity of the airway narrowing progresses with major symptoms include dyspnea, cough, and sputum retention. Other symptoms include severe paroxysms of coughing, wheezing or stridor, barking cough and may be exacerbated by forced expiration, cough, and valsalva maneuver. Tracheomalacia is diagnosed by a bronchoscopic visualization of dynamic airway collapse on dynamic chest CT. Therapy is warranted in symptomatic patients with severe tracheomalacia and includes surgical repair as tracheobronchoplasty. The patient was referred to Dr. Zaid Hernandez or Dr. Allen Fuentes, at [facility] for a surgical consult. \par \par Problem 6: GERD\par -continue Protonix 40 mg before breakfast \par -Rule of 2s: avoid eating too much, eating too fast, eating too late, eating too spicy, eating too lousy, eating two hours before bed.\par -Things to avoid including overeating, spicy foods, tight clothing, eating within three hours of bed, this list is not all inclusive. \par -For treatment of reflux, possible options discussed including diet control, H2 blockers, PPIs, as well as coating motility agents discussed as treatment options. Timing of meals and proximity of last meal to sleep were discussed. If symptoms persist, a formal gastrointestinal evaluation is needed.\par \par Problem 7: R/o PIO (? present)\par -Recommended to complete a home sleep study due to elevated MP class, snoring, large neck size\par Sleep apnea is associated with adverse clinical consequences which an affect most organ systems. Cardiovascular disease risk includes arrhythmias, atrial fibrillation, hypertension, coronary artery disease, and stroke. Metabolic disorders include diabetes type 2, non-alcoholic fatty liver disease. Mood disorder especially depression; and cognitive decline especially in the elderly. Associations with chronic reflux/Guerrero’s esophagus some but not all inclusive. \par -Reasons include arousal consistent with hypopnea; respiratory events most prominent in REM sleep or supine position; therefore sleep staging and body position are important for accurate diagnosis and estimation of AHI. \par \par Problem 8: Poor Mechanics of Breathing\par -Recommended Wim Hof and Buteyko breathing techniques \par - Proper breathing techniques were reviewed with an emphasis of exhalation. Patient instructed to breath in for 1 second and out for four seconds. Patient was encouraged to not talk while walking. \par \par Problem 9: Overweight (improved) \par -Weight loss, exercise, and diet control were discussed and are highly encouraged. Treatment options were given such as, aqua therapy, and contacting a nutritionist. Recommended to use the elliptical, stationary bike, less use of treadmill. Mindful eating was explained to the patient Obesity is associated with worsening asthma, shortness of breath, and potential for cardiac disease, diabetes, and other underlying medical conditions. \par \par Problem 10: Cardiac\par -recommended to follow up with a cardiologist\par \par Problem 11: health maintenance\par -s/p COVID 19 vaccine Moderna x 3\par -s/p influenza vaccine 2021\par -recommended strep pneumonia vaccines after age 65: Prevnar-13 vaccine, followed by Pneumo vaccine 23 one year following\par -recommended early intervention for URIs\par -recommended regular osteoporosis evaluations\par -recommended early dermatological evaluations\par -recommended after the age of 50 to the age of 70, colonoscopy every 5 years \par \par  Follow up in 6-8 weeks\par -he  is recommended to call with any changes, questions, or concerns.

## 2021-12-22 NOTE — ADDENDUM
[FreeTextEntry1] : Documented by Baljinder Rodriguez acting as a scribe for Dr. Zeyad Hernandez on 12/22/2021\par \par All medical record entries made by the scribe were at my, Dr. Zeyad eHrnandez's, direction and personally dictated by me on 12/22/2021. I have reviewed the chart and agree that the record accurately reflects my personal performance of the history, physical exam, assessment, and plan. I have also personally directed, reviewed, and agree with the discharge instructions.

## 2022-03-12 NOTE — ASU DISCHARGE PLAN (ADULT/PEDIATRIC). - SPECIAL INSTRUCTIONS
FDNY
Elevate leg  Ice packs to leg, 15 minutes on and 15 minutes off  Take aspirin 2 times a day for 2 weeks  Remove bandage in 24 hours, then cover sutures with bandaids

## 2022-04-22 ENCOUNTER — NON-APPOINTMENT (OUTPATIENT)
Age: 62
End: 2022-04-22

## 2022-04-27 ENCOUNTER — APPOINTMENT (OUTPATIENT)
Dept: PULMONOLOGY | Facility: CLINIC | Age: 62
End: 2022-04-27
Payer: COMMERCIAL

## 2022-04-27 ENCOUNTER — NON-APPOINTMENT (OUTPATIENT)
Age: 62
End: 2022-04-27

## 2022-04-27 VITALS
TEMPERATURE: 97.3 F | WEIGHT: 167 LBS | HEIGHT: 67 IN | RESPIRATION RATE: 16 BRPM | SYSTOLIC BLOOD PRESSURE: 120 MMHG | DIASTOLIC BLOOD PRESSURE: 76 MMHG | HEART RATE: 86 BPM | OXYGEN SATURATION: 94 % | BODY MASS INDEX: 26.21 KG/M2

## 2022-04-27 DIAGNOSIS — J32.9 CHRONIC SINUSITIS, UNSPECIFIED: ICD-10-CM

## 2022-04-27 PROCEDURE — 94010 BREATHING CAPACITY TEST: CPT

## 2022-04-27 PROCEDURE — 95012 NITRIC OXIDE EXP GAS DETER: CPT

## 2022-04-27 PROCEDURE — 99214 OFFICE O/P EST MOD 30 MIN: CPT | Mod: 25

## 2022-04-27 NOTE — HISTORY OF PRESENT ILLNESS
[TextBox_4] : Mr. WHITE is a 62 year old male with a history of allergies, asthma, chronic cough, eosinophilic asthma, GERD, pulmonary nodules, snoring, SOB, bronchomalacia, and wheezing presenting to the office today for follow up pulmonary evaluation. His chief complaint is \par -he notes he is feeling generally well\par -he notes he has a PND\par -he notes he is exercising on the treadmill\par -he notes he is getting enough sleep (6.5-7 hours)\par -he notes he is still taking Dupixent\par -he notes his energy level is good\par -he notes having some arthritis issues\par -s/p COVID-19 vaccine x3 (booster 12/2021)\par -he notes his doctor wants to do sinus surgery\par -he does not think he snores\par -he notes he has a deviated septum\par \par -patient denies any headaches, nausea, vomiting, fever, chills, sweats, chest pain, chest pressure, palpitations, coughing, wheezing, fatigue, diarrhea, constipation, dysphagia, myalgias, dizziness, leg swelling, leg pain, itchy eyes, itchy ears, heartburn, reflux or sour taste in the mouth

## 2022-04-27 NOTE — ADDENDUM
[FreeTextEntry1] : Documented by Baljinder Rodriguez acting as a scribe for Dr. Zeyad Hernandez on 04/27/2022\par \par All medical record entries made by the scribe were at my, Dr. Zeyad Hernandez's, direction and personally dictated by me on 04/27/2022. I have reviewed the chart and agree that the record accurately reflects my personal performance of the history, physical exam, assessment, and plan. I have also personally directed, reviewed, and agree with the discharge instructions.

## 2022-04-27 NOTE — PROCEDURE
[FreeTextEntry1] : FENO was 11; a normal value being less than 25. Fractional exhaled nitric oxide (FENO) is regarded as a simple, noninvasive method for assessing eosinophilic airway inflammation. Produced by a variety of cells within the lung, nitric oxide (NO) concentrations are generally low in healthy individuals. However, high concentrations of NO appear to be involved in nonspecific host defense mechanisms and chronic inflammatory  diseases such as asthma. The American Thoracic Society (ATS) therefore recommended using FENO to aid in the diagnosis and monitoring of eosinophilic airway inflammation and asthma, and for identifying steroid responsive individuals whose chronic respiratory symptoms may be caused by airway inflammation \par \par PFT revealed normal flows, with a FEV1 of 3.13L, which is 97% of predicted, with a normal flow volume loop

## 2022-04-27 NOTE — ASSESSMENT
[FreeTextEntry1] : Mr. WHITE is a 62 year old male with a history of originally from [place], cervical facet syndrome, deviated septum, chronic sinus issues, herniated disk, GERD, allergies, HLD, eosinophilic asthma, elevated IgE, allergy, GERD, bronchomalacia who comes into the office today for pulmonary evaluation for chronic cough, wheezing, and SOB - improved (at baseline) - awaiting sinus surgery (deviated septum)\par \par The patient's shortness of breath is multifactorial due to:\par -pulmonary disease \par      -severe persistent asthma\par      -eosinophilic asthma\par      -steroid dependent asthma\par      -Tracheomalacia\par -poor breathing mechanics \par -overweight/out of shape\par -?cardiac disease (doubt)\par \par Problem 1: Severe Persistent Asthma (improved)\par -continue Singulair 10 mg before bed \par -continue Trelegy 200 1 inhalation QD or Breo Ellipta 200 at 1 inhalation QD (whichever insurance allows0\par -continue Ventolin rescue inhaler 2 inhalations before exercise, Q6H \par -Asthma is believed to be caused by inherited (genetic) and environmental factor, but its exact cause is unknown. Asthma may be triggered by allergens, lung infections, or irritants in the air. Asthma triggers are different for each person\par -Inhaler technique reviewed as well as oral hygiene techniques reviewed with patient. Avoidance of cold air, extremes of temperature, rescue inhaler should be used before exercise. Order of medication reviewed with patient. Recommended use of a cool mist humidifier in the bedroom.\par \par Problem 2: Steroid Dependent Asthma (controlled)\par -Patient is a candidate for Dupixent. S/p initiate Dupixent 12/2020 - move to [id number] or [id number]\par -Dupixent is a prescription medicine used with other asthma medicines for the maintenance treatment of moderate-to-severe asthma in people aged 12 years and older whose asthma is not controlled with their current asthma medicines. Dupixent helps prevent severe asthma attacks (exacerbations) and can improve your breathing. Dupixent may also help reduce the amount of oral corticosteroids you need while preventing severe asthma attacks and improving your breathing. Dupixent is not used to treat sudden breathing problems. Risks and side effect of Dupixent were discussed and reviewed with patient.\par \par Problem 3: Eosinophilic asthma\par -Patient is a candidate for Nucala or Fasenra\par -The safety and efficacy of Nucala was established in three double-blind, randomized, placebo-controlled trials in patients with severe asthma. Compared to a placebo, patients with severe asthma receiving Nucala had fewer exacerbation requiring hospitalization and/or emergency department visits, and a longer time to first exacerbation. In addition, patients with severe asthma receiving Nucala or Fasenra experienced greater reductions in their daily maintenance oral corticosteroid dose, while maintaining asthma control compared with patients receiving placebo. Treatment with Nucala did not result in a significant improvement in lung function, as measured by the volume of air exhaled by patients in one second. The most common side effects include: headache, injection site reactions, back pain, weakness, and fatigue; hypersensitivity reactions can occur within hours or days including swelling of the face, mouth, and tongue, fainting, dizziness, hives, breathing problems, and rash; herpes zoster infections have occurred. The drug is a monoclonal antibody that inhibits interleukin-5 which helps regular eosinophils, a type of white blood cell that contributes to asthma. The over-production of eosinophils can cause inflammation in the lungs, increasing the frequency of asthma attacks. Patients must also take other medications, including high dose inhaled corticosteroids and at least one additional asthma drug.\par \par Problem 4: Chronic Allergy / Sinus\par -continue Xhance 1 sniff BID - move to once per day\par -continue Olopatadine 0.6% at 1 sniff/nostril BID \par -continue Xyzal 5 mg qHS \par Environmental measures for allergies were encouraged including mattress and pillow cover, air purifier, and environmental controls. \par \par Problem 4A: Deviated Septum\par -Dr. Villa \par \par Problem 5: Bronchomalacia (quiet)\par -s/p dynamic Ct of the chest with Dr. Hernandez\par Tracheomalacia is usually acquired in adults and common causes include damage by tracheostomy or endotracheal intubation damaging the tracheal cartilage with increase risk with multiple intubations, prolonged intubation, and concurrent high dose steroid therapy; external chest wall trauma and surgery; chronic compression of the trachea by benign etiologies (eg, benign mediastinal goiter) or malignancy; relapsing polychondritis; or recurrent infection. Tracheomalacia can be asymptomatic, however signs or symptoms can develop as the severity of the airway narrowing progresses with major symptoms include dyspnea, cough, and sputum retention. Other symptoms include severe paroxysms of coughing, wheezing or stridor, barking cough and may be exacerbated by forced expiration, cough, and valsalva maneuver. Tracheomalacia is diagnosed by a bronchoscopic visualization of dynamic airway collapse on dynamic chest CT. Therapy is warranted in symptomatic patients with severe tracheomalacia and includes surgical repair as tracheobronchoplasty. The patient was referred to Dr. Zaid Hernandez or Dr. Allen Fuentes, at [facility] for a surgical consult. \par \par Problem 6: GERD\par -continue Protonix 40 mg before breakfast \par -Rule of 2s: avoid eating too much, eating too fast, eating too late, eating too spicy, eating too lousy, eating two hours before bed.\par -Things to avoid including overeating, spicy foods, tight clothing, eating within three hours of bed, this list is not all inclusive. \par -For treatment of reflux, possible options discussed including diet control, H2 blockers, PPIs, as well as coating motility agents discussed as treatment options. Timing of meals and proximity of last meal to sleep were discussed. If symptoms persist, a formal gastrointestinal evaluation is needed.\par \par Problem 7: R/o PIO (? present)\par -Recommended to complete a home sleep study due to elevated MP class, snoring, large neck size\par Sleep apnea is associated with adverse clinical consequences which an affect most organ systems. Cardiovascular disease risk includes arrhythmias, atrial fibrillation, hypertension, coronary artery disease, and stroke. Metabolic disorders include diabetes type 2, non-alcoholic fatty liver disease. Mood disorder especially depression; and cognitive decline especially in the elderly. Associations with chronic reflux/Guerrero’s esophagus some but not all inclusive. \par -Reasons include arousal consistent with hypopnea; respiratory events most prominent in REM sleep or supine position; therefore sleep staging and body position are important for accurate diagnosis and estimation of AHI. \par \par Problem 8: Poor Mechanics of Breathing\par -Recommended Wim Hof and Buteyko breathing techniques \par - Proper breathing techniques were reviewed with an emphasis of exhalation. Patient instructed to breath in for 1 second and out for four seconds. Patient was encouraged to not talk while walking. \par \par Problem 9: Overweight (improved) \par -Weight loss, exercise, and diet control were discussed and are highly encouraged. Treatment options were given such as, aqua therapy, and contacting a nutritionist. Recommended to use the elliptical, stationary bike, less use of treadmill. Mindful eating was explained to the patient Obesity is associated with worsening asthma, shortness of breath, and potential for cardiac disease, diabetes, and other underlying medical conditions. \par \par Problem 10: Cardiac\par -recommended to follow up with a cardiologist\par \par Problem 11: health maintenance\par -s/p COVID 19 vaccine Moderna x 3\par -s/p influenza vaccine 2021\par -recommended strep pneumonia vaccines after age 65: Prevnar-13 vaccine, followed by Pneumo vaccine 23 one year following\par -recommended early intervention for URIs\par -recommended regular osteoporosis evaluations\par -recommended early dermatological evaluations\par -recommended after the age of 50 to the age of 70, colonoscopy every 5 years \par \par  Follow up in 6-8 weeks\par -he  is recommended to call with any changes, questions, or concerns.

## 2022-05-02 ENCOUNTER — APPOINTMENT (OUTPATIENT)
Dept: UROLOGY | Facility: CLINIC | Age: 62
End: 2022-05-02
Payer: COMMERCIAL

## 2022-05-02 VITALS
BODY MASS INDEX: 26.06 KG/M2 | SYSTOLIC BLOOD PRESSURE: 119 MMHG | HEART RATE: 82 BPM | WEIGHT: 166 LBS | HEIGHT: 67 IN | DIASTOLIC BLOOD PRESSURE: 77 MMHG

## 2022-05-02 DIAGNOSIS — M47.812 SPONDYLOSIS W/OUT MYELOPATHY OR RADICULOPATHY, CERVICAL REGION: ICD-10-CM

## 2022-05-02 PROCEDURE — 99204 OFFICE O/P NEW MOD 45 MIN: CPT

## 2022-05-02 NOTE — LETTER BODY
[FreeTextEntry1] : Leonel Fuchs, DO\par [address], [address], \[place] [address]\par [phone]\par \par Dear Dr. Fuchs, \par \par Reason for Visit: BPH. Elevated PSA\par \par This is a 62 year-old Citizen of [place] [employer]  with elevated PSA and BPH. The patient is here today for evaluation. His recent PSA was 5.72. The patient reports has not had a previous prostate biopsy. The patient also reports mild symptoms of BPH. His symptoms are of minimal bother to him. He denies any hematuria or urinary incontinence. His symptoms are aggravated by hydration. He denies any alleviating factors. He has not tried any medical therapy previously. He reports no pain. All other review of systems are negative. Past medical history, family history, and social history were inquired and were noncontributory to patient current condition. Medications and allergies were reviewed.\par \par On examination, the patient is a healthy-appearing gentleman in no acute distress. He is alert and oriented follows commands. He has normal mood and affect. He is normocephalic. Neck is supple. Oral no thrush Respirations are unlabored. His abdomen is soft and nontender. Bladder is nonpalpable. No CVA tenderness. Neurologically he is grossly intact. No peripheral edema. Skin without gross abnormality. He has normal male external genitalia. Normal meatus. Bilateral testes are descended intrascrotally and normal to palpation. On rectal examination, there is normal sphincter tone. The prostate is clinically benign without focal induration or nodularity.\par \par ASSESSMENT: BPH. Elevated PSA. \par \par I counseled the patient on the various etiology of his symptoms. In terms of his BPH, I discussed the natural history of BPH and the treatment options available. Given his minimal symptoms, the patient declines medical therapy. In terms of his elevated PSA, I discussed with him the risk of occult malignancy. I discussed the various etiologies of PSA elevation, including enlargement of prostate, inflammation or infection, and prostate cancer. The patient would like to confirm the diagnosis with repeating the PSA. He will also obtain BMP. If his PSA remains elevated, then he will undergo prostate MRI for fusion targeted prostate biopsy.  I counseled the patient regarding the procedure. The risks and benefits were discussed. Alternatives were given. I answered the patient questions. The patient will take the necessary preparations for the procedure, including fleet edema.  The patient will follow-up as directed and will contact me with any questions or concerns.Thank you for the opportunity to participate in the care of this patient. I'll keep you updated on his progress.\par \par Plan: Repeat PSA. BMP. Prostate MRI. Fleet edema. Follow up as directed.

## 2022-05-04 ENCOUNTER — NON-APPOINTMENT (OUTPATIENT)
Age: 62
End: 2022-05-04

## 2022-05-04 LAB
ANION GAP SERPL CALC-SCNC: 14 MMOL/L
BUN SERPL-MCNC: 23 MG/DL
CALCIUM SERPL-MCNC: 9.9 MG/DL
CHLORIDE SERPL-SCNC: 102 MMOL/L
CO2 SERPL-SCNC: 25 MMOL/L
CREAT SERPL-MCNC: 1.12 MG/DL
EGFR: 75 ML/MIN/1.73M2
GLUCOSE SERPL-MCNC: 87 MG/DL
POTASSIUM SERPL-SCNC: 4.2 MMOL/L
PSA FREE FLD-MCNC: 8 %
PSA FREE SERPL-MCNC: 0.43 NG/ML
PSA SERPL-MCNC: 5.2 NG/ML
SODIUM SERPL-SCNC: 141 MMOL/L

## 2022-05-16 ENCOUNTER — APPOINTMENT (OUTPATIENT)
Dept: MRI IMAGING | Facility: CLINIC | Age: 62
End: 2022-05-16
Payer: COMMERCIAL

## 2022-05-16 ENCOUNTER — OUTPATIENT (OUTPATIENT)
Dept: OUTPATIENT SERVICES | Facility: HOSPITAL | Age: 62
LOS: 1 days | End: 2022-05-16
Payer: COMMERCIAL

## 2022-05-16 ENCOUNTER — RESULT REVIEW (OUTPATIENT)
Age: 62
End: 2022-05-16

## 2022-05-16 DIAGNOSIS — J45.50 SEVERE PERSISTENT ASTHMA, UNCOMPLICATED: ICD-10-CM

## 2022-05-16 DIAGNOSIS — R05.3 CHRONIC COUGH: ICD-10-CM

## 2022-05-16 DIAGNOSIS — M47.812 SPONDYLOSIS WITHOUT MYELOPATHY OR RADICULOPATHY, CERVICAL REGION: ICD-10-CM

## 2022-05-16 DIAGNOSIS — Z98.890 OTHER SPECIFIED POSTPROCEDURAL STATES: Chronic | ICD-10-CM

## 2022-05-16 DIAGNOSIS — J30.9 ALLERGIC RHINITIS, UNSPECIFIED: ICD-10-CM

## 2022-05-16 PROCEDURE — 72197 MRI PELVIS W/O & W/DYE: CPT

## 2022-05-16 PROCEDURE — A9585: CPT

## 2022-05-16 PROCEDURE — 76498P: CUSTOM | Mod: 26

## 2022-05-16 PROCEDURE — 72197 MRI PELVIS W/O & W/DYE: CPT | Mod: 26

## 2022-05-16 PROCEDURE — 76498 UNLISTED MR PROCEDURE: CPT

## 2022-05-19 ENCOUNTER — NON-APPOINTMENT (OUTPATIENT)
Age: 62
End: 2022-05-19

## 2022-05-24 ENCOUNTER — OUTPATIENT (OUTPATIENT)
Dept: OUTPATIENT SERVICES | Facility: HOSPITAL | Age: 62
LOS: 1 days | End: 2022-05-24
Payer: COMMERCIAL

## 2022-05-24 ENCOUNTER — APPOINTMENT (OUTPATIENT)
Dept: UROLOGY | Facility: CLINIC | Age: 62
End: 2022-05-24
Payer: COMMERCIAL

## 2022-05-24 VITALS — TEMPERATURE: 98 F | SYSTOLIC BLOOD PRESSURE: 137 MMHG | HEART RATE: 76 BPM | DIASTOLIC BLOOD PRESSURE: 86 MMHG

## 2022-05-24 DIAGNOSIS — Z98.890 OTHER SPECIFIED POSTPROCEDURAL STATES: Chronic | ICD-10-CM

## 2022-05-24 DIAGNOSIS — R35.0 FREQUENCY OF MICTURITION: ICD-10-CM

## 2022-05-24 DIAGNOSIS — R97.20 ELEVATED PROSTATE SPECIFIC ANTIGEN [PSA]: ICD-10-CM

## 2022-05-24 DIAGNOSIS — R97.20 ELEVATED PROSTATE, SPECIFIC ANTIGEN [PSA]: ICD-10-CM

## 2022-05-24 PROCEDURE — 55700: CPT

## 2022-05-24 PROCEDURE — 76942 ECHO GUIDE FOR BIOPSY: CPT | Mod: 26

## 2022-05-24 PROCEDURE — 76942 ECHO GUIDE FOR BIOPSY: CPT | Mod: 59

## 2022-05-27 ENCOUNTER — NON-APPOINTMENT (OUTPATIENT)
Age: 62
End: 2022-05-27

## 2022-05-31 ENCOUNTER — OUTPATIENT (OUTPATIENT)
Dept: OUTPATIENT SERVICES | Facility: HOSPITAL | Age: 62
LOS: 1 days | Discharge: HOME | End: 2022-05-31

## 2022-05-31 DIAGNOSIS — Z98.890 OTHER SPECIFIED POSTPROCEDURAL STATES: Chronic | ICD-10-CM

## 2022-05-31 DIAGNOSIS — C61 MALIGNANT NEOPLASM OF PROSTATE: ICD-10-CM

## 2022-06-01 ENCOUNTER — APPOINTMENT (OUTPATIENT)
Dept: UROLOGY | Facility: CLINIC | Age: 62
End: 2022-06-01
Payer: COMMERCIAL

## 2022-06-01 ENCOUNTER — APPOINTMENT (OUTPATIENT)
Dept: HEMATOLOGY ONCOLOGY | Facility: CLINIC | Age: 62
End: 2022-06-01
Payer: COMMERCIAL

## 2022-06-01 VITALS
WEIGHT: 184.09 LBS | HEART RATE: 75 BPM | BODY MASS INDEX: 29.58 KG/M2 | RESPIRATION RATE: 16 BRPM | OXYGEN SATURATION: 96 % | HEIGHT: 66.14 IN | TEMPERATURE: 98.2 F | DIASTOLIC BLOOD PRESSURE: 87 MMHG | SYSTOLIC BLOOD PRESSURE: 125 MMHG

## 2022-06-01 DIAGNOSIS — Z80.51 FAMILY HISTORY OF MALIGNANT NEOPLASM OF KIDNEY: ICD-10-CM

## 2022-06-01 LAB — CORE LAB BIOPSY: NORMAL

## 2022-06-01 PROCEDURE — 99205 OFFICE O/P NEW HI 60 MIN: CPT

## 2022-06-01 PROCEDURE — 99214 OFFICE O/P EST MOD 30 MIN: CPT

## 2022-06-01 RX ORDER — ENEMA 19; 7 G/133ML; G/133ML
7-19 ENEMA RECTAL
Qty: 1 | Refills: 0 | Status: DISCONTINUED | COMMUNITY
Start: 2022-05-18 | End: 2022-06-01

## 2022-06-01 RX ORDER — OLOPATADINE HYDROCHLORIDE 665 UG/1
0.6 SPRAY, METERED NASAL
Qty: 3 | Refills: 1 | Status: DISCONTINUED | COMMUNITY
Start: 2020-12-01 | End: 2022-06-01

## 2022-06-01 RX ORDER — FLUTICASONE FUROATE AND VILANTEROL TRIFENATATE 200; 25 UG/1; UG/1
200-25 POWDER RESPIRATORY (INHALATION) DAILY
Qty: 3 | Refills: 1 | Status: DISCONTINUED | COMMUNITY
Start: 2021-08-23 | End: 2022-06-01

## 2022-06-01 RX ORDER — ZOSTER VACCINE RECOMBINANT, ADJUVANTED 50 MCG/0.5
50 KIT INTRAMUSCULAR
Qty: 2 | Refills: 0 | Status: DISCONTINUED | COMMUNITY
Start: 2021-09-01 | End: 2022-06-01

## 2022-06-01 RX ORDER — ENEMA 19; 7 G/133ML; G/133ML
7-19 ENEMA RECTAL
Qty: 1 | Refills: 0 | Status: DISCONTINUED | COMMUNITY
Start: 2022-05-02 | End: 2022-06-01

## 2022-06-01 RX ORDER — POLYETHYLENE GLYCOL 3350 AND ELECTROLYTES WITH LEMON FLAVOR 236; 22.74; 6.74; 5.86; 2.97 G/4L; G/4L; G/4L; G/4L; G/4L
236 POWDER, FOR SOLUTION ORAL
Qty: 1 | Refills: 0 | Status: DISCONTINUED | COMMUNITY
Start: 2021-01-22 | End: 2022-06-01

## 2022-06-01 RX ORDER — BUDESONIDE 0.5 MG/2ML
0.5 INHALANT ORAL TWICE DAILY
Qty: 2 | Refills: 3 | Status: DISCONTINUED | COMMUNITY
Start: 2020-10-01 | End: 2022-06-01

## 2022-06-01 RX ORDER — FLUTICASONE FUROATE, UMECLIDINIUM BROMIDE AND VILANTEROL TRIFENATATE 200; 62.5; 25 UG/1; UG/1; UG/1
200-62.5-25 POWDER RESPIRATORY (INHALATION)
Qty: 3 | Refills: 1 | Status: DISCONTINUED | COMMUNITY
Start: 2021-04-20 | End: 2022-06-01

## 2022-06-01 RX ORDER — ALBUTEROL SULFATE 90 UG/1
AEROSOL, METERED RESPIRATORY (INHALATION)
Refills: 0 | Status: DISCONTINUED | COMMUNITY
End: 2022-06-01

## 2022-06-01 RX ORDER — ZOSTER VACCINE RECOMBINANT, ADJUVANTED 50 MCG/0.5
50 KIT INTRAMUSCULAR
Qty: 2 | Refills: 0 | Status: DISCONTINUED | COMMUNITY
Start: 2021-08-23 | End: 2022-06-01

## 2022-06-01 RX ORDER — ZOLPIDEM TARTRATE 5 MG/1
5 TABLET ORAL
Qty: 10 | Refills: 0 | Status: DISCONTINUED | COMMUNITY
Start: 2020-10-16 | End: 2022-06-01

## 2022-06-01 RX ORDER — IPRATROPIUM BROMIDE 21 UG/1
0.03 SPRAY NASAL TWICE DAILY
Qty: 1 | Refills: 2 | Status: DISCONTINUED | COMMUNITY
Start: 2020-09-02 | End: 2022-06-01

## 2022-06-01 RX ORDER — SODIUM SULFATE, POTASSIUM SULFATE, MAGNESIUM SULFATE 17.5; 3.13; 1.6 G/ML; G/ML; G/ML
17.5-3.13-1.6 SOLUTION, CONCENTRATE ORAL
Qty: 1 | Refills: 0 | Status: DISCONTINUED | COMMUNITY
Start: 2021-01-28 | End: 2022-06-01

## 2022-06-01 RX ORDER — MONTELUKAST 10 MG/1
10 TABLET, FILM COATED ORAL
Qty: 90 | Refills: 2 | Status: DISCONTINUED | COMMUNITY
Start: 2020-08-19 | End: 2022-06-01

## 2022-06-01 RX ORDER — FLUTICASONE PROPIONATE 93 UG/1
93 SPRAY, METERED NASAL
Qty: 3 | Refills: 2 | Status: DISCONTINUED | COMMUNITY
Start: 2020-12-01 | End: 2022-06-01

## 2022-06-01 RX ORDER — LEVOCETIRIZINE DIHYDROCHLORIDE 5 MG/1
5 TABLET ORAL
Qty: 1 | Refills: 1 | Status: DISCONTINUED | COMMUNITY
Start: 2020-12-01 | End: 2022-06-01

## 2022-06-01 NOTE — REVIEW OF SYSTEMS
[Anxiety] : anxiety [Negative] : Gastrointestinal [Fever] : no fever [Chills] : no chills [Fatigue] : no fatigue [Recent Change In Weight] : ~T no recent weight change [Chest Pain] : no chest pain [Palpitations] : no palpitations [Lower Ext Edema] : no lower extremity edema [Wheezing] : no wheezing [Cough] : no cough [SOB on Exertion] : no shortness of breath during exertion [Dysuria] : no dysuria [Incontinence] : no incontinence [Joint Pain] : no joint pain [Joint Stiffness] : no joint stiffness [Muscle Pain] : no muscle pain [Skin Rash] : no skin rash [Dizziness] : no dizziness [Depression] : no depression [Muscle Weakness] : no muscle weakness [Easy Bruising] : no tendency for easy bruising No [FreeTextEntry3] : wears glasses [FreeTextEntry9] : trigger finger [de-identified] : No HA, no paresthesias

## 2022-06-01 NOTE — PHYSICAL EXAM
[Fully active, able to carry on all pre-disease performance without restriction] : Status 0 - Fully active, able to carry on all pre-disease performance without restriction [Normal] : affect appropriate [de-identified] : circumcised, glans normal, testes normal

## 2022-06-01 NOTE — HISTORY OF PRESENT ILLNESS
[Disease: _____________________] : Disease: [unfilled] [T: ___] : T[unfilled] [N: ___] : N[unfilled] [M: ___] : M[unfilled] [de-identified] : Leonel Ordoñez is seen in consultation on June 1, 2022, for a recently diagnosed prostate cancer.  His PSA was 3.15 on July 21, 2020.  Repeat PSA on March 29 was 5.37 and a confirmatory PSA on May 2 was 5.2.  With the rise in PSA, he was seen by urologist and underwent a MRI of the pelvis followed by a biopsy of the prostate performed transperineally.  Highest Hometown score was 8 and 1 core.  The majority of cores revealed Hometown score 7 (4+3.  The majority of each core was Hometown 4 pattern. urinary flow is good, nocturia  on occasion. No urgency, no incontinence. No hematuria, no dysuria. Libido is normal, erections are normal. NO bone pains. Had a lot of asthma issues, resolved and well controlled on Dupixent, with elimination of all other drugs.

## 2022-06-01 NOTE — ASSESSMENT
[Palliative Care Plan] : not applicable at this time [FreeTextEntry1] : Leonel Ordoñez is seen in the office today in consultation.  Accompanied by his wife.  He is self-referred.  His wife works in the core laboratory here.  He was recently diagnosed with prostate cancer.  His PSA was 3.15 in July 2020.  A repeat PSA on March 29 of this year was 5.37 and it was confirmed to be elevated with another PSA on May 2 with a value of 5.2.  He was seen by urology and he underwent an MRI of his pelvis followed by a biopsy.  He had 1 core of Rhine 8 disease in the remaining majority of cores were Diomedes 7, 4+3.  The majority of each core had significant volume of Rhine 4 pattern.  Urine.  Nocturia occurs on occasion.  There is no urgency or incontinence.  He has no hematuria dysuria.  Libido is normal and erections are normal.  He does not have any bone pains.  He had a lot of asthma issues and he was on multiple medications with quite a bit of symptoms.  These all resolved and is well controlled on Dupixent, with elimination of all other drugs.\par \par A comprehensive history was obtained, and a physical examination was performed.  There is no family history of prostate cancer.\par \par On physical examination, he appears well and in no acute distress.  There is no palpable adenopathy present.  The lungs are clear and the heart examination is normal.  The abdominal examination is normal.  The phallus is circumcised, the glans is normal, and the testicles are normal.  A rectal examination was deferred, as he is seeing Dr. Barrow shortly after my visit.  There is no edema of the extremities.\par \par We reviewed his pathology report.  We discussed the meaning of the Rhine score and restratification of patients who are screened detected for prostate cancer.  He would be a high risk individual given the Rhine 8 biopsy.  We discussed the MRI results.  One of the tumors identified abuts the capsule, near the neurovascular bundle but there is no gross evidence of extracapsular extension.  The seminal vesicles were normal.\par \par We discussed the staging of prostate cancer as well as the typical behavior of spread of prostate cancer when metastases occur.  We discussed how high risk patients may have microscopic disease dissemination.\par \par We discussed treatment modalities for prostate cancer.  He is seeing Dr. Barrow later today in regards to surgical intervention.  I gave a brief overview of surgery for prostate cancer and expectations, but Dr. Barrow will give him a more complete picture.  We also discussed how radiation therapy can be utilized.  In the case of radiation therapy with high risk disease, it would be generally recommended that he receive 2 years of androgen deprivation therapy in addition to radiation.  We discussed the logistics of radiation therapy and we discussed the adverse effects of adding androgen deprivation therapy to radiation as well as the benefits that occur with this treatment option.\par \par He did not have an appointment to see a radiation oncologist, so I have reached out to Dr. Stevenson to see him in consultation.\par \par We discussed issues of incontinence as well as sexual dysfunction.  It seems that he is concerned about sexual dysfunction, and both treatments are associated with some degree of erectile dysfunction.  With radiation and androgen deprivation therapy, he would likely lose libido and sexual function for a period of time.  This will also factor into his decision making.\par \par All questions were answered to the best of my ability and to their apparent satisfaction.  They came to me asking for information in regards to prostate cancer management, and they will meet with both Dr. Barrow and Dr. Stevenson and come to a decision about treatment.  I stressed that there is no urgency to make a decision immediately.  I also told them that he would likely have a bone scan performed and a CT of the abdomen and pelvis or possibly a PET scan given his high risk stratification.

## 2022-06-01 NOTE — RESULTS/DATA
[FreeTextEntry1] : Final Diagnosis\par 1. Prostate, MRI targeted 1 left anterior, biopsy\par      -Adenocarcinoma of the prostate, Prognostic Grade Group 3 (Woodbine score 4+3=7) involving 90%, 40%, 5% and less than 5% (6 mm, 1.5 mm, 0.5 mm and less than 0.5 mm in length) of 4 of 5 core(s). Diomedes pattern 4 comprises 90% of tumor.\par \par 2. Prostate, MRI targeted 2 right lateral, biopsy\par      -Prostate tissue with a small focus of atypical glands.\par \par 3. Prostate, MRI targeted 3 left midline, biopsy\par      -Adenocarcinoma of the prostate, Prognostic Grade Group 3 (Diomedes score 4+3=7) involving 40% (3 mm in length) of 1 of 6 core(s). Woodbine pattern 4 comprises 90% of tumor.\par \par 4. Prostate, left lateral, biopsy\par      -Adenocarcinoma of the prostate, Prognostic Grade Group 3 (Woodbine score 4+3=7) involving 40% (3 mm in length) of 1 of 1 core(s). Diomedes pattern 4 comprises 60% of tumor.\par \par 5. Prostate, left anterior, biopsy\par      -Adenocarcinoma of the prostate, Prognostic Grade Group 3 (Woodbine score 4+3=7) involving less than 5% (less than 0.5 mm in length) of 1 of 1 core(s). Diomedes pattern 4 comprises 60% of tumor.\par \par 6. Prostate, left posterior lateral apex, biopsy\par      -Benign prostate tissue. \par \par 7. Prostate, left posterior lateral base, biopsy\par      -Benign prostate tissue. \par \par 8. Prostate, right lateral, biopsy\par      -Adenocarcinoma of the prostate, Prognostic Grade Group 1 (Diomedes score 3+3=6) involving less than 5% (less than 0.5 mm in length) of 1 of 1 core(s).\par \par 9. Prostate, right anterior, biopsy\par      -Benign prostate tissue. \par \par 10. Prostate, right posterior lateral apex, biopsy\par      -Adenocarcinoma of the prostate, Prognostic Grade Group 3 (Woodbine score 4+3=7) involving 10% (1.5 mm in length) of 1 of 1 core(s). Diomedes pattern 4 comprises 60% of tumor.\par \par 11. Prostate, right posterior lateral base, biopsy\par      -Benign prostate tissue. \par \par 12. Prostate, right posterior medial apex, biopsy\par      -Benign prostate tissue. \par \par 13. Prostate, right posterior medial base, biopsy\par      -Adenocarcinoma of the prostate, Prognostic Grade Group 4 (Woodbine score 4+4=8) involving 10% (1 mm in length) of 1 of 1 core(s).\par \par 14. Prostate, left posterior medial apex, biopsy\par      -Adenocarcinoma of the prostate, Prognostic Grade Group 3 (Woodbine score 4+3=7) involving 30% (5 mm in length) of 1 of 1 core(s). Diomedes pattern 4 comprises 60% of tumor.\par \par 15. Prostate, left posterior medial base, biopsy\par      -Adenocarcinoma of the prostate, Prognostic Grade Group 3 (Diomedes score 4+3=7) involving 50% (6.5 mm in length) of 1 of 1 core(s). Woodbine pattern 4 comprises 60% of tumor.\par      -Atypical intraductal proliferation (AIP). \par \par Case reported to Tumor Registry.\par \par Verified by: Meme Sebastian M.D., Chief of Genitourinary Pathology\par (Electronic Signature)\par Reported on: 05/26/22 1\par **************************************************\par EXAM: [id number] - MR CAD PROSTATE - ORDERED BY: TRAY CRAWFORD\par EXAM: [id number] - MR PROSTATE WAW IC - ORDERED BY: TRAY CRAWFORD\par PROCEDURE DATE: 05/16/2022\par INTERPRETATION: CLINICAL INFORMATION: Elevated PSA.\par PSA: 5.20 ng/mL\par Prior prostate biopsy: No prior biopsy.\par COMPARISON: None.\par \par CONTRAST/COMPLICATIONS:\par IV Contrast: Gadavist 7.5 cc administered 0 cc discarded\par Oral Contrast: NONE\par Complications: None reported at time of study completion\par \par PROCEDURE:\par 3.0T multiparametric MRI of the pelvis was performed as per Prostate Cancer Protocol including small field-of-view thin section T2 weighted imaging, diffusion-weighted imaging (including ultra high b- values), and dynamic contrast-enhanced imaging.\par \par Computer aided detection/diagnosis was performed using kinetic enhancement analysis on a dedicated [employer] workstation performed by the interpreting radiologist.\par \par FINDINGS:\par \par Size: 3.9 x 3.1 x 4.0 [transverse x AP x CC] cm.\par Volume: 25 mL.\par PSA density: 0.21 ng/mL/cc\par PSA density >0.15 ng/mL/cc: Yes\par Hemorrhage: None.\par \par Central gland: No MRI targetable lesions.\par \par Peripheral zone: Lesions as described below.\par \par LESION: #1\par Location: Left, entire transverse plane (PZa-pl-pm), base-to-midgland, peripheral zone.\par Slice#: Series 4, Image 16.\par Size (transverse, AP, CC): 13 x 8 x 14 mm.\par T2-WI: 3 - Heterogeneous signal intensity or non-circumscribed, rounded, moderate hypointensity (including others that do not qualify as 2, 4, or 5).\par DWI: 3 - Focal (discrete and different from the background) hypointense on ADC and/or focal hyperintense on high b-value DWI; may be markedly hypointense on ADC or\par markedly hyperintense on high b-value DWI, but not both.\par DCE: Positive - focal, and; earlier than enhancement of adjacent normal prostatic tissues, and; corresponds to suspicious finding on T2W and/or DWI.\par Extra-prostatic extension: Abutment, tumor abuts but does not deform capsule.\par PI-RADS Assessment Category: 4, High\par \par LESION: #2\par Location: Right, posterior lateral (PZpl), midgland, peripheral zone.\par Slice#: Series 4, Image 17.\par Size (transverse, AP, CC): 5 x 4 x 6 mm.\par T2-WI: 3 - Heterogeneous signal intensity or non-circumscribed, rounded, moderate hypointensity (including others that do not qualify as 2, 4, or 5).\par DWI: 3 - Focal (discrete and different from the background) hypointense on ADC and/or focal hyperintense on high b-value DWI; may be markedly hypointense on ADC or\par markedly hyperintense on high b-value DWI, but not both.\par DCE: Negative - no early enhancement.\par Extra-prostatic extension: None.\par PI-RADS Assessment Category: 3, Intermediate\par \par LESION: #3\par Location: Left, posterior medial (PZpm), apex, peripheral zone.\par Slice#: Series 8, Image 22.\par Size (transverse, AP, CC): 3 x 3 x 2 mm.\par T2-WI: 3 - Heterogeneous signal intensity or non-circumscribed, rounded, moderate hypointensity (including others that do not qualify as 2, 4, or 5).\par DWI: 3 - Focal (discrete and different from the background) hypointense on ADC and/or focal hyperintense on high b-value DWI; may be markedly hypointense on ADC or\par markedly hyperintense on high b-value DWI, but not both.\par DCE: Negative - no early enhancement.\par Extra-prostatic extension: None.\par PI-RADS Assessment Category: 3, Intermediate\par \par Neurovascular bundle: Left peripheral zone lesion in the and base to mid gland abuts the left neurovascular bundle.\par Seminal vesicles: No seminal vesicle invasion.\par Lymph nodes: No pelvic adenopathy.\par Bones: No suspicious lesions identified.\par Urinary bladder: Trabeculated wall.\par Other: None.\par \par IMPRESSION:\par Prostate lesions as detailed above.\par PIRADS 4 - High (clinically significant cancer is likely to be present)\par \par Prostate Volume: 25 cc\par PSA density: 0.21 ng/mL/cc\par --- End of Report ---\par JOELLEN BREAUX MD; Attending Radiologist\par This document has been electronically signed. May 17 2022 2:47PM

## 2022-06-02 NOTE — PHYSICAL EXAM
[FreeTextEntry1] : discussed options for definitive management for high volume grade 3 cancer in small prostate.\par reviewed RALP versus various forms radiation therapy all of which likely to include ADT.\par he motivated for surgery so most time discussing this in terms of performance, outcomes and issues with incontinence and ED.\par also reviewed radiation therapy and ADT issues.\par to see ALVA

## 2022-06-02 NOTE — HISTORY OF PRESENT ILLNESS
[FreeTextEntry1] : patient of [place] seen for PSA elevation.\par  His PSA was 3.15 on July 21, 2020.  Repeat PSA on March 29 was 5.37 and a confirmatory PSA on May 2 was 5.2.  \par  MRI of the prostate noted small prostate with 2 lesions.\par biopsy notes mix of grade 3 and 1 with 1 core 4.\par no LUTs or ED  [Disease: _____________________] : Disease: [unfilled] [T: ___] : T[unfilled] [N: ___] : N[unfilled] [M: ___] : M[unfilled]

## 2022-06-02 NOTE — DISEASE MANAGEMENT
[1] : T1 [c] : c [X] : MX [0-10] : 0 -10 ng/mL [Biopsy with Fusion] : Patient had a biopsy with fusion on [8] : Template Biopsy Diomedes Score: 8 [7(4+3)] : Fusion Biopsy Holton Score: 7(4+3) [Biopsy results sent to PCP/Referring Physician] : Biopsy results sent to PCP/Referring Physician [] : Patient had a Prostate MRI [4] : 4 [BiopsyDate] : 05/22 [MeasuredProstateVolume] : 25 [TotalCores] : 15 [TotalPositiveCores] : 9 [MaxCoreInvolvement] : 90 [IIC] : IIC

## 2022-06-03 ENCOUNTER — NON-APPOINTMENT (OUTPATIENT)
Age: 62
End: 2022-06-03

## 2022-06-07 ENCOUNTER — APPOINTMENT (OUTPATIENT)
Dept: RADIATION ONCOLOGY | Facility: CLINIC | Age: 62
End: 2022-06-07
Payer: COMMERCIAL

## 2022-06-07 ENCOUNTER — OUTPATIENT (OUTPATIENT)
Dept: OUTPATIENT SERVICES | Facility: HOSPITAL | Age: 62
LOS: 1 days | Discharge: HOME | End: 2022-06-07

## 2022-06-07 VITALS
BODY MASS INDEX: 26.64 KG/M2 | HEART RATE: 76 BPM | OXYGEN SATURATION: 97 % | RESPIRATION RATE: 16 BRPM | SYSTOLIC BLOOD PRESSURE: 125 MMHG | WEIGHT: 169.75 LBS | TEMPERATURE: 98 F | DIASTOLIC BLOOD PRESSURE: 85 MMHG | HEIGHT: 67 IN

## 2022-06-07 DIAGNOSIS — Z98.890 OTHER SPECIFIED POSTPROCEDURAL STATES: Chronic | ICD-10-CM

## 2022-06-07 PROCEDURE — 99205 OFFICE O/P NEW HI 60 MIN: CPT | Mod: 25

## 2022-06-07 NOTE — VITALS
[4 - Distress Level] : Distress Level: 4 [Maximal Pain Intensity: 0/10] : 0/10 [Least Pain Intensity: 0/10] : 0/10 [100: Normal, no complaints, no evidence of disease.] : 100: Normal, no complaints, no evidence of disease. [ECOG Performance Status: 0 - Fully active, able to carry on all pre-disease performance without restriction] : Performance Status: 0 - Fully active, able to carry on all pre-disease performance without restriction

## 2022-06-08 NOTE — PHYSICAL EXAM
[Normal] : well developed, well nourished, in no acute distress [Sclera] : the sclera and conjunctiva were normal [Outer Ear] : the ears and nose were normal in appearance [] : no respiratory distress [Heart Rate And Rhythm] : heart rate and rhythm were normal [Musculoskeletal - Swelling] : no joint swelling [Skin Color & Pigmentation] : normal skin color and pigmentation [No Focal Deficits] : no focal deficits [Oriented To Time, Place, And Person] : oriented to person, place, and time [Respiration, Rhythm And Depth] : normal respiratory rhythm and effort [Exaggerated Use Of Accessory Muscles For Inspiration] : no accessory muscle use [Arterial Pulses Normal] : the arterial pulses were normal [Abdomen Soft] : soft [Nondistended] : nondistended [Abdomen Tenderness] : non-tender

## 2022-06-08 NOTE — DISEASE MANAGEMENT
[1] : T1 [c] : c [X] : MX [0-10] : 0 -10 ng/mL [Biopsy with Fusion] : Patient had a biopsy with fusion on [8] : Template Biopsy Diomedes Score: 8 [7(4+3)] : Fusion Biopsy [place] Score: 7(4+3) [Biopsy results sent to PCP/Referring Physician] : Biopsy results sent to PCP/Referring Physician [] : Patient had a Prostate MRI [4] : 4 [IIC] : IIC [BiopsyDate] : 05/22 [MeasuredProstateVolume] : 25 [TotalCores] : 15 [TotalPositiveCores] : 9 [MaxCoreInvolvement] : 90 [FreeTextEntry7] : MR 5/16/22 had shown a 3.9 x 3.1 x 4 cm = 25 cc prostate, with multiple dominant lesions in left base to mid (1.3 cm, AR-4), right posterolateral md (0.6 cm, AR-3), and left posteriomedial apex (0.3cm, AR-3), with no clear SAAD (some concern for early SAAD left posterolateral capsule); no SVI/LAD. \par \par Biopsy 5/24/22 showed G8(4+4) disease in the right posterior medial base (1/1, 10%) and G7(4+3) disease in target lesions 1 and 3 (4/5, <5-90% and 1/6, 40%) as well as left lateral (1/1, 40%), left anterios (1/1, <5%), right posterior lateral apex (1/1, 10%), left posterior medial apex (1/1, 30%) and left posterior medial base (1/1, 50%); G6(3+3 disease seen in right lateral; 9/15 sites positive.

## 2022-06-08 NOTE — REVIEW OF SYSTEMS
[Patient Intake Form Reviewed] : Patient intake form was reviewed [Nocturia] : nocturia [Negative] : Genitourinary [Abdominal Pain] : no abdominal pain [Constipation] : no constipation [Diarrhea] : no diarrhea [Urinary Frequency] : no urinary frequency [FreeTextEntry8] : 1x  [FreeTextEntry1] : asthma

## 2022-06-08 NOTE — END OF VISIT
[] : Resident [FreeTextEntry3] : I saw and examined this patient on the date of service with my assigned resident physician, Dr. Willow Andrews. I was involved in all procedures and radiographic assessments. I personally confirmed pertinent history and exam findings and reviewed the patient's diagnosis and plan with them.\par \par 62M w/ rcT2b high risk G8(4+4) iPSA 5.2 (5/2/22) prostate cancer. We reviewed his imaging together, and discussed his surgical and radiation treatment options with him; given risk factors, I would favor combination radiation therapy with 1 year ADT. I did recommend obtaining a PSMA PET and the patient agrees; we will arrange this as soon as possible. He does favor surgery and will also follow up with Dr. Cameron to discuss further. We will follow up on the results of his imaging and will be available for any other questions he may have along the way.

## 2022-06-08 NOTE — HISTORY OF PRESENT ILLNESS
[FreeTextEntry1] : Leonel Rendon is a 63 yo M with high risk, high volume prostate adenocarcinoma, GS 4+4=8 in 1 core and majority GS 4+3=7, with 9/15 cores positive, pretreatment PSA of 5.2 and MRI showing 25cc prostate with PZ tumor abutting neurovascular bundle. AJCC IIC. Patient presents to discuss radiation therapy.\par \par Brief Oncological History:\par PSA 5/2/22 - 5.20 ng/mL.\par \par MRI on 5//16/22 showing 25cc prostate with 3 lesions, 1 PIRADS 4 in left transverse plane PZ, and 2 PIRADS 3 lesions. No SV, SAAD or LN involvmented. Noted left peripheral zone lesions base to mid gland abuts the left neurovascular bundle.\par \par On 5/24/22 underwent prostate biopsy with path showing GS 4+3=7 in 2 MR guided cores, involving max 90% of core with GS 4 compromising 90% of core. Additional 5 cores of GS 4+3=7. One core of GS 4+4=8 involving 10% of core in the left posterior medial apex and 1 core showing GS 3+3=6. In totality, 9/15 cores positive, high volume disease.\par \par He has met w/ Dr. Haile and Dr. Barrow thus far and favors undergoing surgery. Today he notes no urinary concerns, nocturia x 0-1, denies frequency, urgency or stream issues. Sexually active with no ED concerns. \par IPSS/EPIC Score 4/5

## 2022-06-17 ENCOUNTER — APPOINTMENT (OUTPATIENT)
Dept: NUCLEAR MEDICINE | Facility: IMAGING CENTER | Age: 62
End: 2022-06-17

## 2022-06-17 ENCOUNTER — APPOINTMENT (OUTPATIENT)
Dept: NUCLEAR MEDICINE | Facility: IMAGING CENTER | Age: 62
End: 2022-06-17
Payer: COMMERCIAL

## 2022-06-17 ENCOUNTER — OUTPATIENT (OUTPATIENT)
Dept: OUTPATIENT SERVICES | Facility: HOSPITAL | Age: 62
LOS: 1 days | End: 2022-06-17
Payer: COMMERCIAL

## 2022-06-17 DIAGNOSIS — Z98.890 OTHER SPECIFIED POSTPROCEDURAL STATES: Chronic | ICD-10-CM

## 2022-06-17 DIAGNOSIS — C61 MALIGNANT NEOPLASM OF PROSTATE: ICD-10-CM

## 2022-06-17 PROCEDURE — A9595: CPT

## 2022-06-17 PROCEDURE — 78816 PET IMAGE W/CT FULL BODY: CPT | Mod: 26

## 2022-06-17 PROCEDURE — 78816 PET IMAGE W/CT FULL BODY: CPT

## 2022-06-21 ENCOUNTER — TRANSCRIPTION ENCOUNTER (OUTPATIENT)
Age: 62
End: 2022-06-21

## 2022-06-21 ENCOUNTER — OUTPATIENT (OUTPATIENT)
Dept: OUTPATIENT SERVICES | Facility: HOSPITAL | Age: 62
LOS: 1 days | End: 2022-06-21
Payer: COMMERCIAL

## 2022-06-21 VITALS
SYSTOLIC BLOOD PRESSURE: 129 MMHG | RESPIRATION RATE: 16 BRPM | HEIGHT: 65 IN | DIASTOLIC BLOOD PRESSURE: 80 MMHG | TEMPERATURE: 97 F | WEIGHT: 166.89 LBS | OXYGEN SATURATION: 99 % | HEART RATE: 60 BPM

## 2022-06-21 DIAGNOSIS — Z98.890 OTHER SPECIFIED POSTPROCEDURAL STATES: Chronic | ICD-10-CM

## 2022-06-21 DIAGNOSIS — C61 MALIGNANT NEOPLASM OF PROSTATE: ICD-10-CM

## 2022-06-21 DIAGNOSIS — J45.909 UNSPECIFIED ASTHMA, UNCOMPLICATED: ICD-10-CM

## 2022-06-21 DIAGNOSIS — J98.09 OTHER DISEASES OF BRONCHUS, NOT ELSEWHERE CLASSIFIED: ICD-10-CM

## 2022-06-21 LAB
ALBUMIN SERPL ELPH-MCNC: 4.4 G/DL — SIGNIFICANT CHANGE UP (ref 3.3–5)
ALP SERPL-CCNC: 55 U/L — SIGNIFICANT CHANGE UP (ref 40–120)
ALT FLD-CCNC: 23 U/L — SIGNIFICANT CHANGE UP (ref 4–41)
ANION GAP SERPL CALC-SCNC: 14 MMOL/L — SIGNIFICANT CHANGE UP (ref 7–14)
AST SERPL-CCNC: 24 U/L — SIGNIFICANT CHANGE UP (ref 4–40)
BILIRUB SERPL-MCNC: 1.4 MG/DL — HIGH (ref 0.2–1.2)
BLD GP AB SCN SERPL QL: NEGATIVE — SIGNIFICANT CHANGE UP
BUN SERPL-MCNC: 15 MG/DL — SIGNIFICANT CHANGE UP (ref 7–23)
CALCIUM SERPL-MCNC: 9.6 MG/DL — SIGNIFICANT CHANGE UP (ref 8.4–10.5)
CHLORIDE SERPL-SCNC: 104 MMOL/L — SIGNIFICANT CHANGE UP (ref 98–107)
CO2 SERPL-SCNC: 24 MMOL/L — SIGNIFICANT CHANGE UP (ref 22–31)
CREAT SERPL-MCNC: 0.99 MG/DL — SIGNIFICANT CHANGE UP (ref 0.5–1.3)
EGFR: 87 ML/MIN/1.73M2 — SIGNIFICANT CHANGE UP
GLUCOSE SERPL-MCNC: 106 MG/DL — HIGH (ref 70–99)
HCT VFR BLD CALC: 42.9 % — SIGNIFICANT CHANGE UP (ref 39–50)
HGB BLD-MCNC: 14.4 G/DL — SIGNIFICANT CHANGE UP (ref 13–17)
MCHC RBC-ENTMCNC: 32 PG — SIGNIFICANT CHANGE UP (ref 27–34)
MCHC RBC-ENTMCNC: 33.6 GM/DL — SIGNIFICANT CHANGE UP (ref 32–36)
MCV RBC AUTO: 95.3 FL — SIGNIFICANT CHANGE UP (ref 80–100)
NRBC # BLD: 0 /100 WBCS — SIGNIFICANT CHANGE UP
NRBC # FLD: 0 K/UL — SIGNIFICANT CHANGE UP
PLATELET # BLD AUTO: 262 K/UL — SIGNIFICANT CHANGE UP (ref 150–400)
POTASSIUM SERPL-MCNC: 3.9 MMOL/L — SIGNIFICANT CHANGE UP (ref 3.5–5.3)
POTASSIUM SERPL-SCNC: 3.9 MMOL/L — SIGNIFICANT CHANGE UP (ref 3.5–5.3)
PROT SERPL-MCNC: 7.1 G/DL — SIGNIFICANT CHANGE UP (ref 6–8.3)
RBC # BLD: 4.5 M/UL — SIGNIFICANT CHANGE UP (ref 4.2–5.8)
RBC # FLD: 11.7 % — SIGNIFICANT CHANGE UP (ref 10.3–14.5)
RH IG SCN BLD-IMP: POSITIVE — SIGNIFICANT CHANGE UP
SODIUM SERPL-SCNC: 142 MMOL/L — SIGNIFICANT CHANGE UP (ref 135–145)
WBC # BLD: 4.75 K/UL — SIGNIFICANT CHANGE UP (ref 3.8–10.5)
WBC # FLD AUTO: 4.75 K/UL — SIGNIFICANT CHANGE UP (ref 3.8–10.5)

## 2022-06-21 PROCEDURE — 93010 ELECTROCARDIOGRAM REPORT: CPT

## 2022-06-21 RX ORDER — BUDESONIDE, MICRONIZED 100 %
2 POWDER (GRAM) MISCELLANEOUS
Qty: 0 | Refills: 0 | DISCHARGE

## 2022-06-21 RX ORDER — DUPILUMAB 300 MG/2ML
0 INJECTION, SOLUTION SUBCUTANEOUS
Qty: 0 | Refills: 0 | DISCHARGE

## 2022-06-21 RX ORDER — OLOPATADINE HYDROCHLORIDE 665 UG/1
2 SPRAY, METERED NASAL
Qty: 0 | Refills: 0 | DISCHARGE

## 2022-06-21 RX ORDER — LEVOCETIRIZINE DIHYDROCHLORIDE 0.5 MG/ML
1 SOLUTION ORAL
Qty: 0 | Refills: 0 | DISCHARGE

## 2022-06-21 RX ORDER — OMEPRAZOLE 10 MG/1
1 CAPSULE, DELAYED RELEASE ORAL
Qty: 0 | Refills: 0 | DISCHARGE

## 2022-06-21 RX ORDER — MONTELUKAST 4 MG/1
1 TABLET, CHEWABLE ORAL
Qty: 0 | Refills: 0 | DISCHARGE

## 2022-06-21 RX ORDER — ALBUTEROL 90 UG/1
2 AEROSOL, METERED ORAL
Qty: 0 | Refills: 0 | DISCHARGE

## 2022-06-21 RX ORDER — UMECLIDINIUM BROMIDE AND VILANTEROL TRIFENATATE 62.5; 25 UG/1; UG/1
1 POWDER RESPIRATORY (INHALATION)
Qty: 0 | Refills: 0 | DISCHARGE

## 2022-06-21 RX ORDER — EPINEPHRINE 0.3 MG/.3ML
0 INJECTION INTRAMUSCULAR; SUBCUTANEOUS
Qty: 0 | Refills: 0 | DISCHARGE

## 2022-06-21 RX ORDER — ZOLPIDEM TARTRATE 10 MG/1
1 TABLET ORAL
Qty: 0 | Refills: 0 | DISCHARGE

## 2022-06-21 RX ORDER — ALPRAZOLAM 0.25 MG
1 TABLET ORAL
Qty: 0 | Refills: 0 | DISCHARGE

## 2022-06-21 RX ORDER — SODIUM CHLORIDE 9 MG/ML
1000 INJECTION, SOLUTION INTRAVENOUS
Refills: 0 | Status: DISCONTINUED | OUTPATIENT
Start: 2022-07-07 | End: 2022-07-07

## 2022-06-21 RX ORDER — GUAIFENESIN/PHENYLPROPANOLAMIN
0 EXPECTORANT ORAL
Qty: 0 | Refills: 0 | DISCHARGE

## 2022-06-21 RX ORDER — FLUTICASONE PROPIONATE 50 MCG
1 SPRAY, SUSPENSION NASAL
Qty: 0 | Refills: 0 | DISCHARGE

## 2022-06-21 RX ORDER — CICLESONIDE 160 UG/1
1 AEROSOL, METERED RESPIRATORY (INHALATION)
Qty: 0 | Refills: 0 | DISCHARGE

## 2022-06-21 NOTE — H&P PST ADULT - PROBLEM SELECTOR PLAN 3
Evaluated by CT surgery last year. No intervention required. No history of anesthesia complications.

## 2022-06-21 NOTE — H&P PST ADULT - PROBLEM SELECTOR PLAN 1
Patient tentatively scheduled for robotic assisted Laparoscopic radical prostatectomy modified pelvic lymph node dissection on 7/7/22.  Pre-op instructions provided. Pt given verbal and written instructions with teach back on chlorhexidine wash and pepcid. Pt verbalized understanding with return demonstration.   Preop Covid PCR test ordered .Instructions regarding covid PCR test to be obtained 3- 5 days prior to surgery and locations for covid testing site provided. Pt verbalized understanding.  PIO precautions,

## 2022-06-21 NOTE — H&P PST ADULT - HISTORY OF PRESENT ILLNESS
63 y/o male with history of asthma, bronchomalacia, GERD, and chronic cough  presents to Presurgical testing with diagnosis of malignant neoplasm of prostate   scheduled for robotic assisted Laparoscopic radical prostatectomy modified pelvic lymph node dissection.

## 2022-06-21 NOTE — H&P PST ADULT - NSICDXPASTMEDICALHX_GEN_ALL_CORE_FT
PAST MEDICAL HISTORY:  Asthma no exacerbation    Bronchomalacia BRAVO study 2021- followed  up with cardio thoracic surgeon - no interventions as per patient    Chronic pain of right knee     Gastroesophageal reflux disease without esophagitis     Other tear of medial meniscus, current injury, right knee, initial encounter     Seasonal allergic rhinitis, unspecified trigger      PAST MEDICAL HISTORY:  Asthma no exacerbation    Bronchomalacia     Chronic pain of right knee     Gastroesophageal reflux disease without esophagitis     Other tear of medial meniscus, current injury, right knee, initial encounter     Seasonal allergic rhinitis, unspecified trigger

## 2022-06-21 NOTE — H&P PST ADULT - PROBLEM SELECTOR PLAN 2
Patient instructed to continue all medication as prescribed.   Last PFT results in chart .  Last pulmonary note in chart. Patient instructed to continue all medication as prescribed.   Last PFT results in chart .

## 2022-06-21 NOTE — H&P PST ADULT - NSICDXPASTSURGICALHX_GEN_ALL_CORE_FT
PAST SURGICAL HISTORY:  H/O arthroscopy of left knee 8 years ago    H/O arthroscopy of right knee 5 years ago    S/P anal fissurectomy

## 2022-06-22 ENCOUNTER — TRANSCRIPTION ENCOUNTER (OUTPATIENT)
Age: 62
End: 2022-06-22

## 2022-06-22 DIAGNOSIS — R22.2 LOCALIZED SWELLING, MASS AND LUMP, TRUNK: ICD-10-CM

## 2022-06-22 LAB
CULTURE RESULTS: SIGNIFICANT CHANGE UP
SPECIMEN SOURCE: SIGNIFICANT CHANGE UP

## 2022-06-23 PROBLEM — J45.909 UNSPECIFIED ASTHMA, UNCOMPLICATED: Chronic | Status: ACTIVE | Noted: 2021-02-19

## 2022-06-23 PROBLEM — J98.09 OTHER DISEASES OF BRONCHUS, NOT ELSEWHERE CLASSIFIED: Chronic | Status: ACTIVE | Noted: 2021-02-19

## 2022-07-01 ENCOUNTER — RESULT REVIEW (OUTPATIENT)
Age: 62
End: 2022-07-01

## 2022-07-01 ENCOUNTER — APPOINTMENT (OUTPATIENT)
Dept: ULTRASOUND IMAGING | Facility: IMAGING CENTER | Age: 62
End: 2022-07-01

## 2022-07-01 ENCOUNTER — APPOINTMENT (OUTPATIENT)
Dept: MAMMOGRAPHY | Facility: IMAGING CENTER | Age: 62
End: 2022-07-01

## 2022-07-01 ENCOUNTER — OUTPATIENT (OUTPATIENT)
Dept: OUTPATIENT SERVICES | Facility: HOSPITAL | Age: 62
LOS: 1 days | End: 2022-07-01
Payer: COMMERCIAL

## 2022-07-01 DIAGNOSIS — Z98.890 OTHER SPECIFIED POSTPROCEDURAL STATES: Chronic | ICD-10-CM

## 2022-07-01 DIAGNOSIS — R22.2 LOCALIZED SWELLING, MASS AND LUMP, TRUNK: ICD-10-CM

## 2022-07-01 PROCEDURE — 77066 DX MAMMO INCL CAD BI: CPT

## 2022-07-01 PROCEDURE — 77066 DX MAMMO INCL CAD BI: CPT | Mod: 26

## 2022-07-01 PROCEDURE — 76641 ULTRASOUND BREAST COMPLETE: CPT | Mod: 26,50

## 2022-07-01 PROCEDURE — 76641 ULTRASOUND BREAST COMPLETE: CPT

## 2022-07-01 PROCEDURE — G0279: CPT | Mod: 26

## 2022-07-01 PROCEDURE — G0279: CPT

## 2022-07-05 ENCOUNTER — NON-APPOINTMENT (OUTPATIENT)
Age: 62
End: 2022-07-05

## 2022-07-05 LAB — SARS-COV-2 N GENE NPH QL NAA+PROBE: NOT DETECTED

## 2022-07-06 ENCOUNTER — TRANSCRIPTION ENCOUNTER (OUTPATIENT)
Age: 62
End: 2022-07-06

## 2022-07-06 ENCOUNTER — APPOINTMENT (OUTPATIENT)
Dept: ULTRASOUND IMAGING | Facility: CLINIC | Age: 62
End: 2022-07-06

## 2022-07-06 NOTE — ASU PATIENT PROFILE, ADULT - CAREGIVER
I have personally performed a face to face diagnostic evaluation on this patient. I have reviewed the ACP note and agree with the history, exam and plan of care, except as noted. Declines Yes

## 2022-07-06 NOTE — ASU PATIENT PROFILE, ADULT - NSICDXPASTMEDICALHX_GEN_ALL_CORE_FT
PAST MEDICAL HISTORY:  Asthma no exacerbation    Bronchomalacia     Chronic pain of right knee     Gastroesophageal reflux disease without esophagitis     Other tear of medial meniscus, current injury, right knee, initial encounter     Seasonal allergic rhinitis, unspecified trigger

## 2022-07-07 ENCOUNTER — INPATIENT (INPATIENT)
Facility: HOSPITAL | Age: 62
LOS: 0 days | Discharge: HOME | End: 2022-07-08
Attending: UROLOGY | Admitting: UROLOGY

## 2022-07-07 ENCOUNTER — RESULT REVIEW (OUTPATIENT)
Age: 62
End: 2022-07-07

## 2022-07-07 ENCOUNTER — APPOINTMENT (OUTPATIENT)
Dept: UROLOGY | Facility: HOSPITAL | Age: 62
End: 2022-07-07

## 2022-07-07 VITALS
HEART RATE: 65 BPM | RESPIRATION RATE: 16 BRPM | WEIGHT: 166.89 LBS | SYSTOLIC BLOOD PRESSURE: 121 MMHG | DIASTOLIC BLOOD PRESSURE: 76 MMHG | HEIGHT: 65 IN | OXYGEN SATURATION: 95 % | TEMPERATURE: 98 F

## 2022-07-07 DIAGNOSIS — Z98.890 OTHER SPECIFIED POSTPROCEDURAL STATES: Chronic | ICD-10-CM

## 2022-07-07 DIAGNOSIS — K21.9 GASTRO-ESOPHAGEAL REFLUX DISEASE WITHOUT ESOPHAGITIS: ICD-10-CM

## 2022-07-07 DIAGNOSIS — F41.9 ANXIETY DISORDER, UNSPECIFIED: ICD-10-CM

## 2022-07-07 DIAGNOSIS — K20.0 EOSINOPHILIC ESOPHAGITIS: ICD-10-CM

## 2022-07-07 DIAGNOSIS — R55 SYNCOPE AND COLLAPSE: ICD-10-CM

## 2022-07-07 DIAGNOSIS — D62 ACUTE POSTHEMORRHAGIC ANEMIA: ICD-10-CM

## 2022-07-07 DIAGNOSIS — C61 MALIGNANT NEOPLASM OF PROSTATE: ICD-10-CM

## 2022-07-07 LAB
HCT VFR BLD CALC: 34.5 % — LOW (ref 39–50)
HGB BLD-MCNC: 11.3 G/DL — LOW (ref 13–17)
MCHC RBC-ENTMCNC: 32.5 PG — SIGNIFICANT CHANGE UP (ref 27–34)
MCHC RBC-ENTMCNC: 32.8 GM/DL — SIGNIFICANT CHANGE UP (ref 32–36)
MCV RBC AUTO: 99.1 FL — SIGNIFICANT CHANGE UP (ref 80–100)
NRBC # BLD: 0 /100 WBCS — SIGNIFICANT CHANGE UP
NRBC # FLD: 0 K/UL — SIGNIFICANT CHANGE UP
PLATELET # BLD AUTO: 213 K/UL — SIGNIFICANT CHANGE UP (ref 150–400)
RBC # BLD: 3.48 M/UL — LOW (ref 4.2–5.8)
RBC # FLD: 11.7 % — SIGNIFICANT CHANGE UP (ref 10.3–14.5)
RH IG SCN BLD-IMP: POSITIVE — SIGNIFICANT CHANGE UP
WBC # BLD: 16.95 K/UL — HIGH (ref 3.8–10.5)
WBC # FLD AUTO: 16.95 K/UL — HIGH (ref 3.8–10.5)

## 2022-07-07 PROCEDURE — 88307 TISSUE EXAM BY PATHOLOGIST: CPT | Mod: 26

## 2022-07-07 PROCEDURE — 38571 LAPAROSCOPY LYMPHADENECTOMY: CPT

## 2022-07-07 PROCEDURE — 99223 1ST HOSP IP/OBS HIGH 75: CPT

## 2022-07-07 PROCEDURE — 88305 TISSUE EXAM BY PATHOLOGIST: CPT | Mod: 26

## 2022-07-07 PROCEDURE — 55866 LAPS SURG PRST8ECT RPBIC RAD: CPT

## 2022-07-07 PROCEDURE — 93010 ELECTROCARDIOGRAM REPORT: CPT

## 2022-07-07 PROCEDURE — 88309 TISSUE EXAM BY PATHOLOGIST: CPT | Mod: 26

## 2022-07-07 DEVICE — LIGATING CLIPS WECK HEMOLOK POLYMER LARGE (PURPLE) 6: Type: IMPLANTABLE DEVICE | Status: FUNCTIONAL

## 2022-07-07 RX ORDER — OXYBUTYNIN CHLORIDE 5 MG
5 TABLET ORAL THREE TIMES A DAY
Refills: 0 | Status: DISCONTINUED | OUTPATIENT
Start: 2022-07-07 | End: 2022-07-08

## 2022-07-07 RX ORDER — LANOLIN ALCOHOL/MO/W.PET/CERES
3 CREAM (GRAM) TOPICAL AT BEDTIME
Refills: 0 | Status: DISCONTINUED | OUTPATIENT
Start: 2022-07-07 | End: 2022-07-08

## 2022-07-07 RX ORDER — MINERAL OIL
30 OIL (ML) MISCELLANEOUS DAILY
Refills: 0 | Status: DISCONTINUED | OUTPATIENT
Start: 2022-07-07 | End: 2022-07-08

## 2022-07-07 RX ORDER — HYDROMORPHONE HYDROCHLORIDE 2 MG/ML
2 INJECTION INTRAMUSCULAR; INTRAVENOUS; SUBCUTANEOUS EVERY 6 HOURS
Refills: 0 | Status: DISCONTINUED | OUTPATIENT
Start: 2022-07-07 | End: 2022-07-08

## 2022-07-07 RX ORDER — KETOROLAC TROMETHAMINE 30 MG/ML
15 SYRINGE (ML) INJECTION ONCE
Refills: 0 | Status: DISCONTINUED | OUTPATIENT
Start: 2022-07-07 | End: 2022-07-07

## 2022-07-07 RX ORDER — SODIUM CHLORIDE 9 MG/ML
1000 INJECTION, SOLUTION INTRAVENOUS ONCE
Refills: 0 | Status: COMPLETED | OUTPATIENT
Start: 2022-07-07 | End: 2022-07-07

## 2022-07-07 RX ORDER — LIDOCAINE 4 G/100G
1 CREAM TOPICAL THREE TIMES A DAY
Refills: 0 | Status: DISCONTINUED | OUTPATIENT
Start: 2022-07-07 | End: 2022-07-08

## 2022-07-07 RX ORDER — HYDROMORPHONE HYDROCHLORIDE 2 MG/ML
0.5 INJECTION INTRAMUSCULAR; INTRAVENOUS; SUBCUTANEOUS
Refills: 0 | Status: DISCONTINUED | OUTPATIENT
Start: 2022-07-07 | End: 2022-07-07

## 2022-07-07 RX ORDER — ALPRAZOLAM 0.25 MG
0.5 TABLET ORAL THREE TIMES A DAY
Refills: 0 | Status: DISCONTINUED | OUTPATIENT
Start: 2022-07-07 | End: 2022-07-07

## 2022-07-07 RX ORDER — IBUPROFEN 200 MG
400 TABLET ORAL EVERY 8 HOURS
Refills: 0 | Status: DISCONTINUED | OUTPATIENT
Start: 2022-07-07 | End: 2022-07-08

## 2022-07-07 RX ORDER — SODIUM CHLORIDE 9 MG/ML
1000 INJECTION, SOLUTION INTRAVENOUS
Refills: 0 | Status: DISCONTINUED | OUTPATIENT
Start: 2022-07-07 | End: 2022-07-08

## 2022-07-07 RX ORDER — ACETAMINOPHEN 500 MG
975 TABLET ORAL EVERY 6 HOURS
Refills: 0 | Status: DISCONTINUED | OUTPATIENT
Start: 2022-07-07 | End: 2022-07-08

## 2022-07-07 RX ORDER — ALPRAZOLAM 0.25 MG
0.5 TABLET ORAL THREE TIMES A DAY
Refills: 0 | Status: DISCONTINUED | OUTPATIENT
Start: 2022-07-07 | End: 2022-07-08

## 2022-07-07 RX ORDER — ONDANSETRON 8 MG/1
4 TABLET, FILM COATED ORAL ONCE
Refills: 0 | Status: DISCONTINUED | OUTPATIENT
Start: 2022-07-07 | End: 2022-07-07

## 2022-07-07 RX ORDER — HEPARIN SODIUM 5000 [USP'U]/ML
5000 INJECTION INTRAVENOUS; SUBCUTANEOUS EVERY 8 HOURS
Refills: 0 | Status: DISCONTINUED | OUTPATIENT
Start: 2022-07-07 | End: 2022-07-07

## 2022-07-07 RX ADMIN — Medication 975 MILLIGRAM(S): at 12:00

## 2022-07-07 RX ADMIN — HYDROMORPHONE HYDROCHLORIDE 0.5 MILLIGRAM(S): 2 INJECTION INTRAMUSCULAR; INTRAVENOUS; SUBCUTANEOUS at 11:30

## 2022-07-07 RX ADMIN — HYDROMORPHONE HYDROCHLORIDE 0.5 MILLIGRAM(S): 2 INJECTION INTRAMUSCULAR; INTRAVENOUS; SUBCUTANEOUS at 11:25

## 2022-07-07 RX ADMIN — Medication 1 TABLET(S): at 17:49

## 2022-07-07 RX ADMIN — HYDROMORPHONE HYDROCHLORIDE 0.5 MILLIGRAM(S): 2 INJECTION INTRAMUSCULAR; INTRAVENOUS; SUBCUTANEOUS at 11:45

## 2022-07-07 RX ADMIN — LIDOCAINE 1 APPLICATION(S): 4 CREAM TOPICAL at 22:44

## 2022-07-07 RX ADMIN — Medication 5 MILLIGRAM(S): at 15:10

## 2022-07-07 RX ADMIN — HYDROMORPHONE HYDROCHLORIDE 2 MILLIGRAM(S): 2 INJECTION INTRAMUSCULAR; INTRAVENOUS; SUBCUTANEOUS at 22:50

## 2022-07-07 RX ADMIN — Medication 975 MILLIGRAM(S): at 23:26

## 2022-07-07 RX ADMIN — Medication 400 MILLIGRAM(S): at 21:10

## 2022-07-07 RX ADMIN — SODIUM CHLORIDE 1000 MILLILITER(S): 9 INJECTION, SOLUTION INTRAVENOUS at 16:55

## 2022-07-07 RX ADMIN — Medication 30 MILLILITER(S): at 17:49

## 2022-07-07 RX ADMIN — Medication 0.5 MILLIGRAM(S): at 21:15

## 2022-07-07 RX ADMIN — Medication 30 MILLILITER(S): at 21:12

## 2022-07-07 RX ADMIN — Medication 15 MILLIGRAM(S): at 15:07

## 2022-07-07 RX ADMIN — Medication 975 MILLIGRAM(S): at 17:51

## 2022-07-07 RX ADMIN — HYDROMORPHONE HYDROCHLORIDE 2 MILLIGRAM(S): 2 INJECTION INTRAMUSCULAR; INTRAVENOUS; SUBCUTANEOUS at 23:50

## 2022-07-07 RX ADMIN — Medication 30 MILLILITER(S): at 15:04

## 2022-07-07 RX ADMIN — SODIUM CHLORIDE 125 MILLILITER(S): 9 INJECTION, SOLUTION INTRAVENOUS at 11:30

## 2022-07-07 RX ADMIN — Medication 15 MILLIGRAM(S): at 15:37

## 2022-07-07 RX ADMIN — HYDROMORPHONE HYDROCHLORIDE 0.5 MILLIGRAM(S): 2 INJECTION INTRAMUSCULAR; INTRAVENOUS; SUBCUTANEOUS at 11:07

## 2022-07-07 NOTE — CHART NOTE - NSCHARTNOTEFT_GEN_A_CORE
Called by RN pt walked to bathroom became dizzy, diaphoretic BP 55/36 HR 63 O2 sat 96%, did not lose consciousness, was taken back to bed, IV opened wide, BP to 98/59 HR 56  Upon my arrival pt alert and oriented, denies CP, SOB, dizziness currently, abdominal pain  /59 HR 60, O2 93%    NCAT  Chest clear   Heart reg  Abdomen midline dressing soaked, removed, some surrounding ecchymoses but no active bleeding even when pressure applied, + SQ emphysema, softly distended, nontender  Gilman draining tea colored urine  Extremities without edema or calf tenderness  No focal neuro findings    Plan:  CBC stat  Pressure dressing to abdominal incision  LR bolus  Will continue to monitor
 Post op Check: 61 yo M POD #0 RALP/LND    Pt seen and examined without complaints. Pain is controlled. Denies SOB/CP/N/V.     Vital Signs Last 24 Hrs  T(C): 36.2 (07 Jul 2022 10:45), Max: 36.4 (07 Jul 2022 05:42)  T(F): 97.2 (07 Jul 2022 10:45), Max: 97.6 (07 Jul 2022 05:42)  HR: 90 (07 Jul 2022 13:00) (65 - 90)  BP: 129/69 (07 Jul 2022 13:00) (109/73 - 140/92)  BP(mean): 84 (07 Jul 2022 12:15) (84 - 108)  RR: 19 (07 Jul 2022 13:00) (12 - 19)  SpO2: 100% (07 Jul 2022 12:15) (95% - 100%)    I&O's Summary  Gilman: 250 tea colored  07 Jul 2022 07:01  -  07 Jul 2022 14:27  --------------------------------------------------------  IN: 250 mL / OUT: 90 mL / NET: 160 mL        Physical Exam  Gen: NAD  Pulm: No respiratory distress, clear to auscultation  CV: Reg  Abd: Midline incision with slight ooze, no active bleeding, 4x4 applied, softly distended, nontender  : Gilman secured  Venodynes: In place                  Plan:   IVF: LR @125  Diet: CLD  Labs: In am  Abx: Bactrim  Strict I&Os  Analgesia and antiemetics as needed  DVT prophylaxis/OOB/Incentive spirometry  F/u medicine

## 2022-07-07 NOTE — CONSULT NOTE ADULT - SUBJECTIVE AND OBJECTIVE BOX
Patient is a 62y old  Male who presents with a chief complaint of malignant neoplasm of prostate (2022 11:06)      HPI: 62M h/o seasonal allergies, bronchomalacia, chronic cough found to be due to eosinophilic esophagitis, found to have elevated PSA on routine testing, c/w prostate cancer. Denies urinary symptoms.   s/p Multiport RALP w/ PLND. Earlier this afternoon almost passed out while walking to the BR - felt dizzy, nauseous, sweaty. Resting in bed now, feeling well. No chest pain, SOB, current dizziness.     Allergies: No Known Allergies    HOME MEDICATIONS:   · 	Dupixent Pre-filled Pen 300 mg/2 mL subcutaneous solution: injection every 3 weeks   · 	ALPRAZolam 0.5 mg oral tablet: 1 tab(s) orally 3 times a day prn   · 	Valerian Root oral capsule: 1 tab daily prn   	Last dose 22     MEDICATIONS  (STANDING):  acetaminophen     Tablet .. 975 milliGRAM(s) Oral every 6 hours  ALPRAZolam 0.5 milliGRAM(s) Oral three times a day  aluminum hydroxide/magnesium hydroxide/simethicone Suspension 30 milliLiter(s) Oral every 8 hours  ibuprofen  Tablet. 400 milliGRAM(s) Oral every 8 hours  lactated ringers. 1000 milliLiter(s) (125 mL/Hr) IV Continuous <Continuous>  lidocaine 5% Ointment 1 Application(s) Topical three times a day  mineral oil 30 milliLiter(s) Oral daily  trimethoprim  160 mG/sulfamethoxazole 800 mG 1 Tablet(s) Oral daily    MEDICATIONS  (PRN):  HYDROmorphone   Tablet 2 milliGRAM(s) Oral every 6 hours PRN Severe Pain (7 - 10)  oxybutynin 5 milliGRAM(s) Oral three times a day PRN Bladder spasms    PAST MEDICAL & SURGICAL HISTORY:  Seasonal allergic rhinitis, unspecified trigger  Other tear of medial meniscus, current injury, right knee, initial encounter  Gastroesophageal reflux disease without esophagitis  Chronic pain of right knee  Bronchomalacia  Asthma no exacerbation  H/O arthroscopy of left knee 8 years ago  H/O arthroscopy of right knee 5 years ago  S/P anal fissurectomy    SOCIAL HISTORY:  , database adm, 2 children  no tob/drugs; rare alcohol    FAMILY HISTORY:  Family history of Alzheimer&#x27;s disease (Mother)  FHx: kidney cancer mother  father  CHF, heart disease    REVIEW OF SYSTEMS:  CONSTITUTIONAL: No fever, weight loss, or fatigue  EYES: No eye pain, visual disturbances, or discharge  ENMT:  No difficulty hearing, tinnitus, vertigo; No sinus or throat pain  NECK: No pain or stiffness  BREASTS: No pain, masses, or nipple discharge  RESPIRATORY: No cough, wheezing, chills or hemoptysis; No shortness of breath  CARDIOVASCULAR: No chest pain, palpitations, dizziness, or leg swelling  GASTROINTESTINAL: No abdominal or epigastric pain. No nausea, vomiting, or hematemesis; No diarrhea or constipation. No melena or hematochezia.  GENITOURINARY: No dysuria, frequency, hematuria, or incontinence  NEUROLOGICAL: No headaches, memory loss, loss of strength, numbness, or tremors  SKIN: No itching, burning, rashes, or lesions   LYMPH NODES: No enlarged glands  ENDOCRINE: No heat or cold intolerance; No hair loss  MUSCULOSKELETAL: No muscle or back pain  PSYCHIATRIC: No depression, anxiety, mood swings, or difficulty sleeping  HEME/LYMPH: No easy bruising, or bleeding gums  ALLERGY AND IMMUNOLOGIC: No hives or eczema  [  ] All other ROS negative  [  ] Unable to obtain due to poor mental status    Vital Signs Last 24 Hrs  T(C): 36.3 (2022 17:57), Max: 36.4 (2022 05:42)  T(F): 97.3 (2022 17:57), Max: 97.6 (2022 05:42)  HR: 69 (2022 17:57) (54 - 90)  BP: 117/62 (2022 17:57) (98/59 - 140/92)  BP(mean): 84 (2022 12:15) (84 - 108)  RR: 19 (2022 17:57) (12 - 19)  SpO2: 96% (2022 17:57) (93% - 100%)    Parameters below as of 2022 17:57  Patient On (Oxygen Delivery Method): room air    PHYSICAL EXAM:  GENERAL: NAD, well-groomed, well-developed  HEAD:  Atraumatic, Normocephalic  EYES: EOMI, PERRLA, conjunctiva and sclera clear  ENMT: Moist mucous membranes  NECK: Supple, No JVD  RESPIRATORY: Clear to auscultation bilaterally; No rales, rhonchi, wheezing, or rubs  CARDIOVASCULAR: Regular rate and rhythm; No murmurs, rubs, or gallops  GASTROINTESTINAL: Soft, ND, incision covered with gauze  GENITOURINARY: +hensley  EXTREMITIES:  2+ Peripheral Pulses, No clubbing, cyanosis, or edema  NERVOUS SYSTEM:  Alert & Oriented X3; Moving all 4 extremities; No gross sensory deficits  HEME/LYMPH: No lymphadenopathy noted  SKIN: No rashes or lesions  PSYCH: calm, appropriate    LABS:  Comprehensive Metabolic Panel (22 @ 13:24)    Sodium, Serum: 142 mmol/L    Potassium, Serum: 3.9 mmol/L    Chloride, Serum: 104 mmol/L    Carbon Dioxide, Serum: 24 mmol/L    Anion Gap, Serum: 14 mmol/L    Blood Urea Nitrogen, Serum: 15 mg/dL    Creatinine, Serum: 0.99 mg/dL    Glucose, Serum: 106 mg/dL    Calcium, Total Serum: 9.6 mg/dL    Protein Total, Serum: 7.1 g/dL    Albumin, Serum: 4.4 g/dL    Bilirubin Total, Serum: 1.4 mg/dL    Alkaline Phosphatase, Serum: 55 U/L    Aspartate Aminotransferase (AST/SGOT): 24 U/L    Alanine Aminotransferase (ALT/SGPT): 23 U/L    eGFR: 87:     Complete Blood Count (22 @ 13:24)    Nucleated RBC: 0 /100 WBCs    WBC Count: 4.75 K/uL    RBC Count: 4.50 M/uL    Hemoglobin: 14.4 g/dL    Hematocrit: 42.9 %    Mean Cell Volume: 95.3 fL    Mean Cell Hemoglobin: 32.0 pg    Mean Cell Hemoglobin Conc: 33.6 gm/dL    Red Cell Distrib Width: 11.7 %    Platelet Count - Automated: 262 K/uL    Nucleated RBC #: 0.00 K/uL                              11.3   16.95 )-----------( 213      ( 2022 17:00 )             34.5     RADIOLOGY & ADDITIONAL STUDIES:    EKG:   Personally Reviewed:  [ ] YES     Imaging:   Personally Reviewed:  [ ] YES               Consultant(s) notes reviewed:    Care Discussed with Consultant(s)/Other Providers: urology re overall care

## 2022-07-07 NOTE — CONSULT NOTE ADULT - PROBLEM SELECTOR RECOMMENDATION 4
chronic cough/asthma type symptoms developed about 2 years ago, well controlled on Dupixent every 3 weeks - outpt f/u with Dr. Hernandez as usual

## 2022-07-07 NOTE — CONSULT NOTE ADULT - PROBLEM SELECTOR RECOMMENDATION 3
post op management per urology, await return of bowel function, pain control, IVFs, IS, DVT ppx (venodynes)

## 2022-07-07 NOTE — CONSULT NOTE ADULT - ASSESSMENT
62M h/o seasonal allergies, bronchomalacia, chronic cough found to be due to eosinophilic esophagitis, found to have elevated PSA, prostate cancer.  7/7 s/p Multiport RALP w/ PLND.

## 2022-07-07 NOTE — CONSULT NOTE ADULT - PROBLEM SELECTOR RECOMMENDATION 2
Hb 14--> 11, likely due to acute blood loss from surgery  serial CBC  close monitoring of wound/clinically

## 2022-07-07 NOTE — PATIENT PROFILE ADULT - FALL HARM RISK - HARM RISK INTERVENTIONS

## 2022-07-08 ENCOUNTER — TRANSCRIPTION ENCOUNTER (OUTPATIENT)
Age: 62
End: 2022-07-08

## 2022-07-08 VITALS — RESPIRATION RATE: 18 BRPM | TEMPERATURE: 98 F | OXYGEN SATURATION: 96 %

## 2022-07-08 LAB
ANION GAP SERPL CALC-SCNC: 7 MMOL/L — SIGNIFICANT CHANGE UP (ref 7–14)
BUN SERPL-MCNC: 14 MG/DL — SIGNIFICANT CHANGE UP (ref 7–23)
CALCIUM SERPL-MCNC: 8.6 MG/DL — SIGNIFICANT CHANGE UP (ref 8.4–10.5)
CHLORIDE SERPL-SCNC: 104 MMOL/L — SIGNIFICANT CHANGE UP (ref 98–107)
CO2 SERPL-SCNC: 28 MMOL/L — SIGNIFICANT CHANGE UP (ref 22–31)
CREAT SERPL-MCNC: 0.99 MG/DL — SIGNIFICANT CHANGE UP (ref 0.5–1.3)
EGFR: 87 ML/MIN/1.73M2 — SIGNIFICANT CHANGE UP
GLUCOSE SERPL-MCNC: 118 MG/DL — HIGH (ref 70–99)
HCT VFR BLD CALC: 31 % — LOW (ref 39–50)
HGB BLD-MCNC: 10.2 G/DL — LOW (ref 13–17)
MCHC RBC-ENTMCNC: 32.6 PG — SIGNIFICANT CHANGE UP (ref 27–34)
MCHC RBC-ENTMCNC: 32.9 GM/DL — SIGNIFICANT CHANGE UP (ref 32–36)
MCV RBC AUTO: 99 FL — SIGNIFICANT CHANGE UP (ref 80–100)
NRBC # BLD: 0 /100 WBCS — SIGNIFICANT CHANGE UP
NRBC # FLD: 0 K/UL — SIGNIFICANT CHANGE UP
PLATELET # BLD AUTO: 216 K/UL — SIGNIFICANT CHANGE UP (ref 150–400)
POTASSIUM SERPL-MCNC: 4.2 MMOL/L — SIGNIFICANT CHANGE UP (ref 3.5–5.3)
POTASSIUM SERPL-SCNC: 4.2 MMOL/L — SIGNIFICANT CHANGE UP (ref 3.5–5.3)
RBC # BLD: 3.13 M/UL — LOW (ref 4.2–5.8)
RBC # FLD: 11.6 % — SIGNIFICANT CHANGE UP (ref 10.3–14.5)
SODIUM SERPL-SCNC: 139 MMOL/L — SIGNIFICANT CHANGE UP (ref 135–145)
WBC # BLD: 11.66 K/UL — HIGH (ref 3.8–10.5)
WBC # FLD AUTO: 11.66 K/UL — HIGH (ref 3.8–10.5)

## 2022-07-08 PROCEDURE — 99233 SBSQ HOSP IP/OBS HIGH 50: CPT

## 2022-07-08 RX ORDER — NALOXONE HYDROCHLORIDE 4 MG/.1ML
4 SPRAY NASAL
Qty: 2 | Refills: 0
Start: 2022-07-08 | End: 2022-07-08

## 2022-07-08 RX ORDER — SODIUM CHLORIDE 9 MG/ML
1000 INJECTION, SOLUTION INTRAVENOUS
Refills: 0 | Status: DISCONTINUED | OUTPATIENT
Start: 2022-07-08 | End: 2022-07-08

## 2022-07-08 RX ORDER — LIDOCAINE 4 G/100G
1 CREAM TOPICAL
Qty: 15 | Refills: 0
Start: 2022-07-08

## 2022-07-08 RX ORDER — NALOXONE HYDROCHLORIDE 4 MG/.1ML
4 SPRAY NASAL
Qty: 1 | Refills: 0
Start: 2022-07-08

## 2022-07-08 RX ORDER — HYDROMORPHONE HYDROCHLORIDE 2 MG/ML
1 INJECTION INTRAMUSCULAR; INTRAVENOUS; SUBCUTANEOUS
Qty: 8 | Refills: 0
Start: 2022-07-08

## 2022-07-08 RX ORDER — GUAIFENESIN/PHENYLPROPANOLAMIN
0 EXPECTORANT ORAL
Qty: 0 | Refills: 0 | DISCHARGE

## 2022-07-08 RX ORDER — OXYBUTYNIN CHLORIDE 5 MG
1 TABLET ORAL
Qty: 15 | Refills: 0
Start: 2022-07-08

## 2022-07-08 RX ORDER — ACETAMINOPHEN 500 MG
3 TABLET ORAL
Qty: 0 | Refills: 0 | DISCHARGE
Start: 2022-07-08

## 2022-07-08 RX ORDER — IBUPROFEN 200 MG
1 TABLET ORAL
Qty: 21 | Refills: 0
Start: 2022-07-08 | End: 2022-07-14

## 2022-07-08 RX ORDER — MINERAL OIL
30 OIL (ML) MISCELLANEOUS
Qty: 0 | Refills: 0 | DISCHARGE
Start: 2022-07-08

## 2022-07-08 RX ADMIN — Medication 975 MILLIGRAM(S): at 05:45

## 2022-07-08 RX ADMIN — Medication 30 MILLILITER(S): at 12:54

## 2022-07-08 RX ADMIN — Medication 975 MILLIGRAM(S): at 12:53

## 2022-07-08 RX ADMIN — Medication 30 MILLILITER(S): at 05:45

## 2022-07-08 RX ADMIN — Medication 1 TABLET(S): at 13:22

## 2022-07-08 RX ADMIN — Medication 400 MILLIGRAM(S): at 05:47

## 2022-07-08 RX ADMIN — Medication 5 MILLIGRAM(S): at 12:54

## 2022-07-08 RX ADMIN — Medication 975 MILLIGRAM(S): at 13:23

## 2022-07-08 RX ADMIN — LIDOCAINE 1 APPLICATION(S): 4 CREAM TOPICAL at 05:46

## 2022-07-08 NOTE — PROGRESS NOTE ADULT - PROBLEM SELECTOR PLAN 1
Overall story and episode c/w vasovagal episode. No head trauma. No indication for head imaging.   - No recurrent symptoms. Hg drop likely 2/2 acute blood loss anemia from surgery, no signs of ongoing bleed. BP stable, negative orthostats.

## 2022-07-08 NOTE — DISCHARGE NOTE PROVIDER - NSDCFUSCHEDAPPT_GEN_ALL_CORE_FT
[facility]  UROLOGY [address] [facility]  Scheduled Appointment: 07/13/2022    Ermias Cameron  [facility]  UROLOGY [address] [facility]  Scheduled Appointment: 07/13/2022    Ermias Cameron  [facility]  [facility]  Scheduled Appointment: 07/13/2022    [facility]  ULTRASND  [facility]  Scheduled Appointment: 07/25/2022    Zeyad Hernandez  [address]  Scheduled Appointment: 08/24/2022

## 2022-07-08 NOTE — DISCHARGE NOTE PROVIDER - NSDCCPCAREPLAN_GEN_ALL_CORE_FT
PRINCIPAL DISCHARGE DIAGNOSIS  Diagnosis: Malignant neoplasm of prostate  Assessment and Plan of Treatment: Stay on a clear liquid diet until passing flatus consistently and you are hungry.  Walk 5 to 10 minutes every hour from 8am to 8pm.  Use the incentive spirometer 10 times every hour to 2000cc.  Shower daily.  Take acetaminophen 1000mg every 8hrs around the clock x 7 days, ibuprofen 400mg every 8hrs around the clock alternating with acetaminophen.  Take dilaudid 4mg 1/2 tablet every 4 to 6 hrs as needed for severe pain.  Take oxybutynin 5mg 3 times a day as needed for bladder spasms. Take gaviscon 2 tabs or 30cc 3 times a day, mineral oil 30cc daily until BMs are soft  & regular.  Take antibiotic as prescribed for 7 days.  Use lidocaine jelly 2 to 3 times a day to tip of penis.  Empty hensley bag as needed as instructed.  No heavy lifting or straining for 4 to 6 weeks, avoid constipation. You may have intermittent pink tinged urine and small amounts of leakage around hensley (due to bladder spasms).  This is normal.   If your urine becomes bright red or with clots, or there is no urine in the bag, please call the office.   Dr. Cameron's office will call you to schedule a follow up appointment next week for hensley removal and further management.  Call the office if you have fever greater than 101, no urine in bag, pain not relieved with pain medication, nausea/vomiting.        SECONDARY DISCHARGE DIAGNOSES  Diagnosis: Mild anxiety  Assessment and Plan of Treatment: Continue current home medications and follow up with your primary care provider, do not take at the same time as dilaudid.

## 2022-07-08 NOTE — DISCHARGE NOTE PROVIDER - HOSPITAL COURSE
61 yo M underwent RALP/LND on 7/7/2022.  Postoperatively, pain controlled, urine remained acceptable in color, pt had vasovagal episode POD #0, however on POD #1 HCT dstable and pt ambulating without difficulty/symptoms.  Tolerating CLD, no N/V. Pt d/c with hensley to leg bag to self advance diet and f/u with Dr. Cameron.   61 yo M underwent RALP/LND on 7/7/2022.  Postoperatively, pain controlled, urine remained acceptable in color, pt had vasovagal episode POD #0, however on POD #1 HCT dstable and pt ambulating without difficulty/symptoms, negative orthostatics.  Tolerating CLD, no N/V. Pt d/c with hensley to leg bag to self advance diet and f/u with Dr. Cameron.

## 2022-07-08 NOTE — DISCHARGE NOTE PROVIDER - CARE PROVIDER_API CALL
Ermias Cameron)  Urology  [address] [place] [address]  Phone: [phone]  Fax: [phone]  Follow Up Time:

## 2022-07-08 NOTE — PROGRESS NOTE ADULT - ASSESSMENT
stable s/p RALP  POD#1 - no events; passed gas  Plan:  - OOB  - labs  - poss home later
62M h/o seasonal allergies, bronchomalacia, chronic cough found to be due to eosinophilic esophagitis, found to have elevated PSA, prostate cancer.  7/7 s/p Multiport RALP w/ PLND.

## 2022-07-08 NOTE — DISCHARGE NOTE NURSING/CASE MANAGEMENT/SOCIAL WORK - PATIENT PORTAL LINK FT
You can access the FollowMyHealth Patient Portal offered by [facility] by registering at the following website: [email or web address]. By joining [employer]’s FollowMyHealth portal, you will also be able to view your health information using other applications (apps) compatible with our system.

## 2022-07-08 NOTE — DISCHARGE NOTE PROVIDER - NSDCMRMEDTOKEN_GEN_ALL_CORE_FT
acetaminophen 325 mg oral tablet: 3 tablets every 8 hours around the clock for 7 days, alternate with ibuprofen  ALPRAZolam 0.5 mg oral tablet: 1 tab(s) orally 3 times a day prn   Dupixent Pre-filled Pen 300 mg/2 mL subcutaneous solution: injection every 3 weeks   Gaviscon 80 mg-14.2 mg oral tablet, chewable: 2 tablets 3 time a day  mineral oil oral liquid: 30 milliliters daily until BMs are soft and regular   acetaminophen 325 mg oral tablet: 3 tablets every 8 hours around the clock for 7 days, alternate with ibuprofen  ALPRAZolam 0.5 mg oral tablet: 1 tab(s) orally 3 times a day prn   Dupixent Pre-filled Pen 300 mg/2 mL subcutaneous solution: injection every 3 weeks   Gaviscon 80 mg-14.2 mg oral tablet, chewable: 2 tablets 3 time a day  HYDROmorphone 4 mg oral tablet: 1/2 tablet every 4 to 6 hours as needed for severe pain MDD:3  ibuprofen 400 mg oral tablet: 1 tablet every 8 hours around the clock for 7 days, alternate with acetaminophen  lidocaine 5% topical ointment: Apply to tip of penis 2 to 3 times a day  mineral oil oral liquid: 30 milliliters daily until BMs are soft and regular  Narcan 4 mg/0.1 mL nasal spray: 4 milligram(s) intranasally once if patient has passed out and you cannot wake them up, call 911 repeat x one if needed  oxybutynin 5 mg oral tablet: 1 tablet 3 times a day, As needed, Bladder spasms  sulfamethoxazole-trimethoprim 800 mg-160 mg oral tablet: 1 tab(s) orally once a day

## 2022-07-08 NOTE — PROGRESS NOTE ADULT - SUBJECTIVE AND OBJECTIVE BOX
Patient is a 62y old  Male who presents with a chief complaint of Removal of prostate and lymph nodes (08 Jul 2022 12:25)    SUBJECTIVE / OVERNIGHT EVENTS: No acute events since vasovagal episode yesterday - denies any head trauma, lasted only a few moments, wife provided collateral, she supported patient, did not fall. This AM, feeling well. No chest pain, nausea, vomiting, abdominal pain, dizziness, headache, visual changes, bleeding.     MEDICATIONS  (STANDING):  acetaminophen     Tablet .. 975 milliGRAM(s) Oral every 6 hours  aluminum hydroxide/magnesium hydroxide/simethicone Suspension 30 milliLiter(s) Oral every 8 hours  dextrose 5% + sodium chloride 0.45%. 1000 milliLiter(s) (75 mL/Hr) IV Continuous <Continuous>  ibuprofen  Tablet. 400 milliGRAM(s) Oral every 8 hours  lidocaine 5% Ointment 1 Application(s) Topical three times a day  melatonin 3 milliGRAM(s) Oral at bedtime  mineral oil 30 milliLiter(s) Oral daily  trimethoprim  160 mG/sulfamethoxazole 800 mG 1 Tablet(s) Oral daily    MEDICATIONS  (PRN):  ALPRAZolam 0.5 milliGRAM(s) Oral three times a day PRN anxiety  HYDROmorphone   Tablet 2 milliGRAM(s) Oral every 6 hours PRN Severe Pain (7 - 10)  oxybutynin 5 milliGRAM(s) Oral three times a day PRN Bladder spasms    CAPILLARY BLOOD GLUCOSE    I&O's Summary    07 Jul 2022 07:01  -  08 Jul 2022 07:00  --------------------------------------------------------  IN: 250 mL / OUT: 2240 mL / NET: -1990 mL    08 Jul 2022 07:01  -  08 Jul 2022 14:58  --------------------------------------------------------  IN: 0 mL / OUT: 700 mL / NET: -700 mL    PHYSICAL EXAM:  Vital Signs Last 24 Hrs  T(C): 36.6 (08 Jul 2022 12:45), Max: 36.7 (07 Jul 2022 22:10)  T(F): 97.9 (08 Jul 2022 12:45), Max: 98.1 (08 Jul 2022 05:54)  HR: 84 (08 Jul 2022 09:21) (54 - 108)  BP: 111/75 (08 Jul 2022 09:21) (95/60 - 117/62)  BP(mean): --  RR: 18 (08 Jul 2022 12:45) (17 - 19)  SpO2: 96% (08 Jul 2022 12:45) (93% - 98%)    Parameters below as of 08 Jul 2022 12:45  Patient On (Oxygen Delivery Method): room air    CONSTITUTIONAL: NAD, well-developed, well-groomed  EYES: conjunctiva and sclera clear  ENMT: Moist oral mucosa  RESPIRATORY: Normal respiratory effort; lungs are clear to auscultation bilaterally  CARDIOVASCULAR: Regular rate and rhythm, normal S1 and S2, No lower extremity edema; Peripheral pulses are 2+ bilaterally  ABDOMEN: Nontender to palpation, +bowel sounds, no rebound/guarding  PSYCH: calm, affect appropriate  NEUROLOGY: moving all extremities, no sensory deficits  SKIN: No rashes; no palpable lesions    LABS:                        10.2   11.66 )-----------( 216      ( 08 Jul 2022 06:16 )             31.0     07-08    139  |  104  |  14  ----------------------------<  118<H>  4.2   |  28  |  0.99    Ca    8.6      08 Jul 2022 06:16    RADIOLOGY & ADDITIONAL TESTS: Reviewed    COORDINATION OF CARE:  Care Discussed with Consultants/Other Providers [Y- Urology PA]  
POD #1  Afeb 95/60 87 98%RA    Pt has no c/o  Abd- soft NT ND; + flatus     wounds C&D  Gilman 1700 pink  Ruby clears

## 2022-07-08 NOTE — DISCHARGE NOTE NURSING/CASE MANAGEMENT/SOCIAL WORK - NSDPDISTO_GEN_ALL_CORE
Home Pt. is afebrile and offers no complaints. In no acute distress. Abdominal scope sites: clean, dry and intact. Pt is ambulating, tolerating diet well. Gilman is patent and draining adequate amounts of urine./Home

## 2022-07-08 NOTE — DISCHARGE NOTE NURSING/CASE MANAGEMENT/SOCIAL WORK - NSDCPNINST_GEN_ALL_CORE
Make a follow up appointment with Dr. Cameron. Please review Dr. Cameron's discharge information. Call MD if you develop a fever, or if there is redness, swelling, drainage or pain not relieved by pain medication. No heavy lifting, bending, or straining to move your bowels. Take over the counter stool softeners as needed to prevent constipation which may be caused by pain medication. Drink plenty of liquids. Gilman and leg bag care as instructed.

## 2022-07-08 NOTE — DISCHARGE NOTE PROVIDER - INSTRUCTIONS
Keep well hydrated, stay on a clear liquid diet until you pass gas more consistently and are hungry, then advance your diet slowly as tolerated.  Eat small, frequent amounts, your appetite will take a while to return to normal.

## 2022-07-12 LAB — SURGICAL PATHOLOGY STUDY: SIGNIFICANT CHANGE UP

## 2022-07-12 RX ORDER — GABAPENTIN 100 MG/1
100 CAPSULE ORAL
Qty: 1 | Refills: 0 | Status: DISCONTINUED | COMMUNITY
Start: 2022-07-05 | End: 2022-07-12

## 2022-07-13 ENCOUNTER — APPOINTMENT (OUTPATIENT)
Dept: UROLOGY | Facility: CLINIC | Age: 62
End: 2022-07-13

## 2022-07-13 ENCOUNTER — OUTPATIENT (OUTPATIENT)
Dept: OUTPATIENT SERVICES | Facility: HOSPITAL | Age: 62
LOS: 1 days | End: 2022-07-13
Payer: COMMERCIAL

## 2022-07-13 VITALS — TEMPERATURE: 98 F | DIASTOLIC BLOOD PRESSURE: 78 MMHG | HEART RATE: 48 BPM | SYSTOLIC BLOOD PRESSURE: 117 MMHG

## 2022-07-13 DIAGNOSIS — R35.0 FREQUENCY OF MICTURITION: ICD-10-CM

## 2022-07-13 DIAGNOSIS — Z98.890 OTHER SPECIFIED POSTPROCEDURAL STATES: Chronic | ICD-10-CM

## 2022-07-13 PROCEDURE — 51700 IRRIGATION OF BLADDER: CPT

## 2022-07-13 PROCEDURE — 52000 CYSTOURETHROSCOPY: CPT | Mod: 58

## 2022-07-13 NOTE — ASU PATIENT PROFILE, ADULT - NS SC CAGE ALCOHOL CUT DOWN
You can access the FollowMyHealth Patient Portal offered by [facility] by registering at the following website: [email or web address]. By joining [employer]’s FollowMyHealth portal, you will also be able to view your health information using other applications (apps) compatible with our system.
no

## 2022-07-15 ENCOUNTER — NON-APPOINTMENT (OUTPATIENT)
Age: 62
End: 2022-07-15

## 2022-07-15 LAB
ANION GAP SERPL CALC-SCNC: 17 MMOL/L
BUN SERPL-MCNC: 18 MG/DL
CALCIUM SERPL-MCNC: 9 MG/DL
CHLORIDE SERPL-SCNC: 100 MMOL/L
CO2 SERPL-SCNC: 20 MMOL/L
CREAT SERPL-MCNC: 1.94 MG/DL
EGFR: 39 ML/MIN/1.73M2
GLUCOSE SERPL-MCNC: 109 MG/DL
POTASSIUM SERPL-SCNC: 4.6 MMOL/L
SODIUM SERPL-SCNC: 136 MMOL/L

## 2022-07-19 ENCOUNTER — TRANSCRIPTION ENCOUNTER (OUTPATIENT)
Age: 62
End: 2022-07-19

## 2022-07-19 ENCOUNTER — NON-APPOINTMENT (OUTPATIENT)
Age: 62
End: 2022-07-19

## 2022-07-21 ENCOUNTER — OUTPATIENT (OUTPATIENT)
Dept: OUTPATIENT SERVICES | Facility: HOSPITAL | Age: 62
LOS: 1 days | End: 2022-07-21
Payer: COMMERCIAL

## 2022-07-21 ENCOUNTER — APPOINTMENT (OUTPATIENT)
Dept: UROLOGY | Facility: CLINIC | Age: 62
End: 2022-07-21

## 2022-07-21 DIAGNOSIS — C61 MALIGNANT NEOPLASM OF PROSTATE: ICD-10-CM

## 2022-07-21 DIAGNOSIS — Z98.890 OTHER SPECIFIED POSTPROCEDURAL STATES: Chronic | ICD-10-CM

## 2022-07-21 DIAGNOSIS — R35.0 FREQUENCY OF MICTURITION: ICD-10-CM

## 2022-07-21 PROCEDURE — 51700 IRRIGATION OF BLADDER: CPT

## 2022-07-21 PROCEDURE — 99024 POSTOP FOLLOW-UP VISIT: CPT

## 2022-07-21 PROCEDURE — 51700 IRRIGATION OF BLADDER: CPT | Mod: 58

## 2022-07-21 RX ORDER — IBUPROFEN 400 MG/1
400 TABLET, FILM COATED ORAL
Qty: 21 | Refills: 0 | Status: DISCONTINUED | COMMUNITY
Start: 2022-07-08

## 2022-07-21 RX ORDER — HYDROMORPHONE HYDROCHLORIDE 4 MG/1
4 TABLET ORAL
Qty: 8 | Refills: 0 | Status: DISCONTINUED | COMMUNITY
Start: 2022-07-08

## 2022-07-21 RX ORDER — LIDOCAINE 5 G/100G
5 OINTMENT TOPICAL
Qty: 35 | Refills: 0 | Status: DISCONTINUED | COMMUNITY
Start: 2022-07-08

## 2022-07-21 RX ORDER — OXYBUTYNIN CHLORIDE 5 MG/1
5 TABLET ORAL
Qty: 15 | Refills: 0 | Status: DISCONTINUED | COMMUNITY
Start: 2022-07-08

## 2022-07-22 DIAGNOSIS — C61 MALIGNANT NEOPLASM OF PROSTATE: ICD-10-CM

## 2022-07-22 LAB
ANION GAP SERPL CALC-SCNC: 12 MMOL/L
BUN SERPL-MCNC: 19 MG/DL
CALCIUM SERPL-MCNC: 9.6 MG/DL
CHLORIDE SERPL-SCNC: 102 MMOL/L
CO2 SERPL-SCNC: 24 MMOL/L
CREAT SERPL-MCNC: 1.46 MG/DL
EGFR: 55 ML/MIN/1.73M2
GLUCOSE SERPL-MCNC: 99 MG/DL
POTASSIUM SERPL-SCNC: 5 MMOL/L
SODIUM SERPL-SCNC: 138 MMOL/L

## 2022-07-25 ENCOUNTER — OUTPATIENT (OUTPATIENT)
Dept: OUTPATIENT SERVICES | Facility: HOSPITAL | Age: 62
LOS: 1 days | End: 2022-07-25
Payer: COMMERCIAL

## 2022-07-25 ENCOUNTER — APPOINTMENT (OUTPATIENT)
Dept: ULTRASOUND IMAGING | Facility: IMAGING CENTER | Age: 62
End: 2022-07-25

## 2022-07-25 ENCOUNTER — TRANSCRIPTION ENCOUNTER (OUTPATIENT)
Age: 62
End: 2022-07-25

## 2022-07-25 ENCOUNTER — RESULT REVIEW (OUTPATIENT)
Age: 62
End: 2022-07-25

## 2022-07-25 DIAGNOSIS — Z98.890 OTHER SPECIFIED POSTPROCEDURAL STATES: Chronic | ICD-10-CM

## 2022-07-25 DIAGNOSIS — R22.2 LOCALIZED SWELLING, MASS AND LUMP, TRUNK: ICD-10-CM

## 2022-07-25 DIAGNOSIS — C61 MALIGNANT NEOPLASM OF PROSTATE: ICD-10-CM

## 2022-07-25 PROCEDURE — 88305 TISSUE EXAM BY PATHOLOGIST: CPT | Mod: 26

## 2022-07-25 PROCEDURE — 88305 TISSUE EXAM BY PATHOLOGIST: CPT

## 2022-07-25 PROCEDURE — 77065 DX MAMMO INCL CAD UNI: CPT

## 2022-07-25 PROCEDURE — 19083 BX BREAST 1ST LESION US IMAG: CPT

## 2022-07-25 PROCEDURE — A4648: CPT

## 2022-07-25 PROCEDURE — 77065 DX MAMMO INCL CAD UNI: CPT | Mod: 26,RT

## 2022-07-25 PROCEDURE — 19083 BX BREAST 1ST LESION US IMAG: CPT | Mod: RT

## 2022-07-27 ENCOUNTER — TRANSCRIPTION ENCOUNTER (OUTPATIENT)
Age: 62
End: 2022-07-27

## 2022-07-27 ENCOUNTER — NON-APPOINTMENT (OUTPATIENT)
Age: 62
End: 2022-07-27

## 2022-07-27 LAB — SURGICAL PATHOLOGY STUDY: SIGNIFICANT CHANGE UP

## 2022-07-28 ENCOUNTER — APPOINTMENT (OUTPATIENT)
Dept: CT IMAGING | Facility: IMAGING CENTER | Age: 62
End: 2022-07-28

## 2022-07-28 ENCOUNTER — OUTPATIENT (OUTPATIENT)
Dept: OUTPATIENT SERVICES | Facility: HOSPITAL | Age: 62
LOS: 1 days | End: 2022-07-28
Payer: COMMERCIAL

## 2022-07-28 ENCOUNTER — APPOINTMENT (OUTPATIENT)
Dept: UROLOGY | Facility: CLINIC | Age: 62
End: 2022-07-28

## 2022-07-28 ENCOUNTER — TRANSCRIPTION ENCOUNTER (OUTPATIENT)
Age: 62
End: 2022-07-28

## 2022-07-28 ENCOUNTER — RESULT REVIEW (OUTPATIENT)
Age: 62
End: 2022-07-28

## 2022-07-28 DIAGNOSIS — Z90.79 ACQUIRED ABSENCE OF OTHER GENITAL ORGAN(S): ICD-10-CM

## 2022-07-28 DIAGNOSIS — Z98.890 OTHER SPECIFIED POSTPROCEDURAL STATES: Chronic | ICD-10-CM

## 2022-07-28 DIAGNOSIS — C61 MALIGNANT NEOPLASM OF PROSTATE: ICD-10-CM

## 2022-07-28 PROCEDURE — 72192 CT PELVIS W/O DYE: CPT

## 2022-07-28 PROCEDURE — 99024 POSTOP FOLLOW-UP VISIT: CPT

## 2022-07-28 PROCEDURE — 72192 CT PELVIS W/O DYE: CPT | Mod: 26

## 2022-07-28 RX ORDER — SULFAMETHOXAZOLE AND TRIMETHOPRIM 800; 160 MG/1; MG/1
800-160 TABLET ORAL
Qty: 14 | Refills: 0 | Status: DISCONTINUED | COMMUNITY
Start: 2022-07-14 | End: 2022-07-28

## 2022-08-09 NOTE — ASSESSMENT
[FreeTextEntry1] : Pt seen for PVR, which  was 3mL. Pt s/p roboticassisted laparoscopic radical prostatectomy with modified pelvic lymph node dissection on 7/7/22- reporting LLQ abdominal pain - CT pelvis w/o contrast ordered to r/o lymphocele- pt to schedule.

## 2022-08-24 ENCOUNTER — APPOINTMENT (OUTPATIENT)
Dept: PULMONOLOGY | Facility: CLINIC | Age: 62
End: 2022-08-24

## 2022-08-24 ENCOUNTER — NON-APPOINTMENT (OUTPATIENT)
Age: 62
End: 2022-08-24

## 2022-08-24 VITALS
HEART RATE: 87 BPM | WEIGHT: 163 LBS | HEIGHT: 67 IN | OXYGEN SATURATION: 97 % | TEMPERATURE: 97.4 F | DIASTOLIC BLOOD PRESSURE: 84 MMHG | RESPIRATION RATE: 16 BRPM | SYSTOLIC BLOOD PRESSURE: 120 MMHG | BODY MASS INDEX: 25.58 KG/M2

## 2022-08-24 DIAGNOSIS — R05.3 CHRONIC COUGH: ICD-10-CM

## 2022-08-24 PROCEDURE — 99214 OFFICE O/P EST MOD 30 MIN: CPT | Mod: 25

## 2022-08-24 PROCEDURE — 95012 NITRIC OXIDE EXP GAS DETER: CPT

## 2022-08-24 PROCEDURE — 94010 BREATHING CAPACITY TEST: CPT

## 2022-08-24 NOTE — PROCEDURE
[FreeTextEntry1] : PFT reveals normal flows, with an FEV1 of 3.24 L, which is 100% of predicted, with normal flow volume loop \par \par FENO was 11; normal value being less than 25\par Fractional exhaled nitric oxide (FENO) is regarded as a simple, noninvasive method for assessing eosinophilic airway inflammation. Produced by a variety of cells within the lung, nitric oxide (NO) concentrations are generally low in healthy individuals. However, high concentrations of NO appear to be involved in nonspecific host defense mechanisms and chronic inflammatory diseases such as asthma. The American Thoracic Society (ATS) therefore has recommended using FENO to aid in the diagnosis and monitoring of eosinophilic airway inflammation and asthma, and for identifying steroid responsive individuals whose chronic respiratory symptoms may be airway inflammation.

## 2022-08-24 NOTE — REASON FOR VISIT
[Follow-Up] : a follow-up visit [Spouse] : spouse [TextBox_44] : severe persistent / steroid dependent / eosinophilic asthma, chronic sinus / allergies, GERD, bronchomalacia, ?PIO

## 2022-08-24 NOTE — ADDENDUM
[FreeTextEntry1] : Documented by Priyanka Black acting as a scribe for Dr. Zeyad Hernandez on 08/24/2022 \par \par All medical record entries made by the Scribe were at my, Dr. Zeyad Hernandez's, direction and personally dictated by me on 08/24/2022 . I have reviewed the chart and agree that the record accurately reflects my personal performance of the history, physical exam, assessment and plan. I have also personally directed, reviewed, and agree with the discharge instructions

## 2022-08-24 NOTE — ASSESSMENT
[FreeTextEntry1] : Mr. WHITE is a 62 year old male with a history of originally from [place], cervical facet syndrome, deviated septum, chronic sinus issues, herniated disk, GERD, allergies, HLD, eosinophilic asthma, elevated IgE, allergy, GERD, bronchomalacia who comes into the office today for pulmonary evaluation for chronic cough, wheezing, and SOB - improved (at baseline) - s/p sinus surgery (deviated septum)/ prostate surgery (cancer)\par \par The patient's shortness of breath is multifactorial due to:\par -pulmonary disease \par      -severe persistent asthma\par      -eosinophilic asthma\par      -steroid dependent asthma\par      -Tracheomalacia\par -poor breathing mechanics \par -overweight/out of shape\par -?cardiac disease (doubt)\par \par Problem 1: Severe Persistent Asthma (improved)\par -continue Singulair 10 mg before bed \par -continue Trelegy 200 1 inhalation QD or Breo Ellipta 200 at 1 inhalation QD (whichever insurance allows0\par -continue Ventolin rescue inhaler 2 inhalations before exercise, Q6H \par -Asthma is believed to be caused by inherited (genetic) and environmental factor, but its exact cause is unknown. Asthma may be triggered by allergens, lung infections, or irritants in the air. Asthma triggers are different for each person\par -Inhaler technique reviewed as well as oral hygiene techniques reviewed with patient. Avoidance of cold air, extremes of temperature, rescue inhaler should be used before exercise. Order of medication reviewed with patient. Recommended use of a cool mist humidifier in the bedroom.\par \par Problem 2: Steroid Dependent Asthma (controlled)\par -Patient is a candidate for Dupixent. S/p Dupixent 12/2020 - move to T0khdly or O3hzivi\par -Dupixent is a prescription medicine used with other asthma medicines for the maintenance treatment of moderate-to-severe asthma in people aged 12 years and older whose asthma is not controlled with their current asthma medicines. Dupixent helps prevent severe asthma attacks (exacerbations) and can improve your breathing. Dupixent may also help reduce the amount of oral corticosteroids you need while preventing severe asthma attacks and improving your breathing. Dupixent is not used to treat sudden breathing problems. Risks and side effect of Dupixent were discussed and reviewed with patient.\par \par Problem 3: Eosinophilic asthma\par -Patient is a candidate for Nucala or Fasenra\par -The safety and efficacy of Nucala was established in three double-blind, randomized, placebo-controlled trials in patients with severe asthma. Compared to a placebo, patients with severe asthma receiving Nucala had fewer exacerbation requiring hospitalization and/or emergency department visits, and a longer time to first exacerbation. In addition, patients with severe asthma receiving Nucala or Fasenra experienced greater reductions in their daily maintenance oral corticosteroid dose, while maintaining asthma control compared with patients receiving placebo. Treatment with Nucala did not result in a significant improvement in lung function, as measured by the volume of air exhaled by patients in one second. The most common side effects include: headache, injection site reactions, back pain, weakness, and fatigue; hypersensitivity reactions can occur within hours or days including swelling of the face, mouth, and tongue, fainting, dizziness, hives, breathing problems, and rash; herpes zoster infections have occurred. The drug is a monoclonal antibody that inhibits interleukin-5 which helps regular eosinophils, a type of white blood cell that contributes to asthma. The over-production of eosinophils can cause inflammation in the lungs, increasing the frequency of asthma attacks. Patients must also take other medications, including high dose inhaled corticosteroids and at least one additional asthma drug.\par \par Problem 4: Chronic Allergy / Sinus\par -continue Xhance 1 sniff BID - move to once per day\par -continue Olopatadine 0.6% at 1 sniff/nostril BID \par -continue Xyzal 5 mg qHS \par Environmental measures for allergies were encouraged including mattress and pillow cover, air purifier, and environmental controls. \par \par Problem 4A: Deviated Septum\par -Dr. Villa \par \par Problem 5: Bronchomalacia (quiet)\par -s/p dynamic Ct of the chest with Dr. Hernandez\par Tracheomalacia is usually acquired in adults and common causes include damage by tracheostomy or endotracheal intubation damaging the tracheal cartilage with increase risk with multiple intubations, prolonged intubation, and concurrent high dose steroid therapy; external chest wall trauma and surgery; chronic compression of the trachea by benign etiologies (eg, benign mediastinal goiter) or malignancy; relapsing polychondritis; or recurrent infection. Tracheomalacia can be asymptomatic, however signs or symptoms can develop as the severity of the airway narrowing progresses with major symptoms include dyspnea, cough, and sputum retention. Other symptoms include severe paroxysms of coughing, wheezing or stridor, barking cough and may be exacerbated by forced expiration, cough, and valsalva maneuver. Tracheomalacia is diagnosed by a bronchoscopic visualization of dynamic airway collapse on dynamic chest CT. Therapy is warranted in symptomatic patients with severe tracheomalacia and includes surgical repair as tracheobronchoplasty. The patient was referred to Dr. Zaid Hernandez or Dr. Allen Fuentes, at [facility] for a surgical consult. \par \par Problem 6: GERD\par -continue Protonix 40 mg before breakfast \par -Rule of 2s: avoid eating too much, eating too fast, eating too late, eating too spicy, eating too lousy, eating two hours before bed.\par -Things to avoid including overeating, spicy foods, tight clothing, eating within three hours of bed, this list is not all inclusive. \par -For treatment of reflux, possible options discussed including diet control, H2 blockers, PPIs, as well as coating motility agents discussed as treatment options. Timing of meals and proximity of last meal to sleep were discussed. If symptoms persist, a formal gastrointestinal evaluation is needed.\par \par Problem 7: R/o PIO (? present)\par -Recommended to complete a home sleep study due to elevated MP class, snoring, large neck size\par Sleep apnea is associated with adverse clinical consequences which an affect most organ systems. Cardiovascular disease risk includes arrhythmias, atrial fibrillation, hypertension, coronary artery disease, and stroke. Metabolic disorders include diabetes type 2, non-alcoholic fatty liver disease. Mood disorder especially depression; and cognitive decline especially in the elderly. Associations with chronic reflux/Guerrero’s esophagus some but not all inclusive. \par -Reasons include arousal consistent with hypopnea; respiratory events most prominent in REM sleep or supine position; therefore sleep staging and body position are important for accurate diagnosis and estimation of AHI. \par \par Problem 8: Poor Mechanics of Breathing\par -Recommended Wim Hof and Buteyko breathing techniques \par - Proper breathing techniques were reviewed with an emphasis of exhalation. Patient instructed to breath in for 1 second and out for four seconds. Patient was encouraged to not talk while walking. \par \par Problem 9: Overweight (improved) \par -Weight loss, exercise, and diet control were discussed and are highly encouraged. Treatment options were given such as, aqua therapy, and contacting a nutritionist. Recommended to use the elliptical, stationary bike, less use of treadmill. Mindful eating was explained to the patient Obesity is associated with worsening asthma, shortness of breath, and potential for cardiac disease, diabetes, and other underlying medical conditions. \par \par Problem 10: Cardiac\par -recommended to follow up with a cardiologist\par \par Problem 11: health maintenance\par -s/p COVID 19 vaccine Moderna x 3\par -s/p influenza vaccine 2021\par -recommended strep pneumonia vaccines after age 65: Prevnar-13 vaccine, followed by Pneumo vaccine 23 one year following\par -recommended early intervention for URIs\par -recommended regular osteoporosis evaluations\par -recommended early dermatological evaluations\par -recommended after the age of 50 to the age of 70, colonoscopy every 5 years \par \par  Follow up in 6-8 weeks\par -he  is recommended to call with any changes, questions, or concerns.

## 2022-08-24 NOTE — PHYSICAL EXAM
[No Acute Distress] : no acute distress [Normal Oropharynx] : normal oropharynx [Normal Appearance] : normal appearance [No Neck Mass] : no neck mass [Normal Rate/Rhythm] : normal rate/rhythm [Normal S1, S2] : normal s1, s2 [No Murmurs] : no murmurs [No Resp Distress] : no resp distress [Clear to Auscultation Bilaterally] : clear to auscultation bilaterally [No Abnormalities] : no abnormalities [Benign] : benign [Normal Gait] : normal gait [No Clubbing] : no clubbing [No Cyanosis] : no cyanosis [No Edema] : no edema [FROM] : FROM [Normal Color/ Pigmentation] : normal color/ pigmentation [No Focal Deficits] : no focal deficits [Oriented x3] : oriented x3 [Normal Affect] : normal affect [II] : Mallampati Class: II [TextBox_68] : I:E ratio 1:3; clear

## 2022-08-24 NOTE — HISTORY OF PRESENT ILLNESS
[TextBox_4] : Mr. WHITE is a 62 year old male with a history of allergies, asthma, chronic cough, eosinophilic asthma, GERD, pulmonary nodules, snoring, SOB, bronchomalacia, and wheezing presenting to the office today for follow up pulmonary evaluation. His chief complaint is \par \par -he notes generally feeling good \par -he notes decrease in exercise due to surgery but looking to restart regular routine \par -he notes bowels are regular \par -he denies dysphonia\par -he notes poor quality of sleep \par -he notes getting about 6 hrs of sleep on average \par -he notes snoring only in supine position \par -he notes allergies have been quiet \par -he notes loss of weight due to change in diet and exercise \par \par -He denies any visual issues, headaches, nausea, vomiting, fever, chills, sweats, chest pains, chest pressure, diarrhea, constipation, dysphagia, myalgia, dizziness, leg swelling, leg pain, itchy eyes, itchy ears, heartburn, reflux, or sour taste in the mouth.

## 2022-09-01 ENCOUNTER — NON-APPOINTMENT (OUTPATIENT)
Age: 62
End: 2022-09-01

## 2022-09-01 LAB
ANION GAP SERPL CALC-SCNC: 12 MMOL/L
BUN SERPL-MCNC: 17 MG/DL
CALCIUM SERPL-MCNC: 9.8 MG/DL
CHLORIDE SERPL-SCNC: 105 MMOL/L
CO2 SERPL-SCNC: 27 MMOL/L
CREAT SERPL-MCNC: 1.08 MG/DL
EGFR: 79 ML/MIN/1.73M2
GLUCOSE SERPL-MCNC: 93 MG/DL
POTASSIUM SERPL-SCNC: 4.9 MMOL/L
SODIUM SERPL-SCNC: 144 MMOL/L

## 2022-09-13 ENCOUNTER — APPOINTMENT (OUTPATIENT)
Dept: UROLOGY | Facility: CLINIC | Age: 62
End: 2022-09-13

## 2022-09-13 VITALS
DIASTOLIC BLOOD PRESSURE: 95 MMHG | HEART RATE: 86 BPM | TEMPERATURE: 97.9 F | RESPIRATION RATE: 16 BRPM | SYSTOLIC BLOOD PRESSURE: 137 MMHG | OXYGEN SATURATION: 94 %

## 2022-09-13 PROCEDURE — 99215 OFFICE O/P EST HI 40 MIN: CPT | Mod: 24

## 2022-09-13 NOTE — ASSESSMENT
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  Erectile dysfunction can affect him in the all ages.  Normal quality of erections depends on normal in flow of blood through the cavernosal arteries, distention of the corpora cavernosa and closure of the veins draining the penis.  I compared getting erectile dysfunction to feeling of the sink with force of being in the artery and drain being a vein.  This help patient understand physiology of erections.  Normal erection is regulated by sensory input from the penis as well as normal arousal and processing of the sexual cues.  Complex interplay between the neuroendocrine system and autonomic nervous system as well as sensory input was discussed.\par \par Most common reasons for erectile dysfunction in younger men are performance anxiety, sexual experience, lack of point of reference, and realistic expectations.  Congenital abnormalities of vessels can also be identified.  They typically present with inability to sustain erection from late teenager to early 20s.  Will call with venous leak.\par \par In older men erectile dysfunction can be due to atherosclerosis, elevated cholesterol, low testosterone, diabetes, injury to autonomic nervous system, loss of sensation, abnormal processing of sexual cues and disorder of arousal.\par \par Obesity, smoking, use of statin medication can negatively affect erectile function.\par \par Increasing exercise, healthy eating habits, getting adequate amount of sleep, all of those can be helpful in attaining normal erections.\par \par \par Evaluation of erectile dysfunction is here to work patient's needs and goals.  Typically will start with just oral medication with medication like Cialis or Viagra.  Use of those medications was explained to the patient.  Difference between on demand and daily use was discussed.  Viagra needs to be taken on empty stomach.\par \par Mechanism of action of erectile dysfunction medication was also discussed.\par \par In men who have failed to respond to oral therapy or who have inadequate response level we then moved to penile Doppler ultrasound after penile injection.\par \par Injection of vasculogenic medication, specifically combination of papaverine, phentolamine and prostaglandin E1 is necessary to achieve adequate erection during penile Doppler ultrasound.  If penile Doppler ultrasound is scheduled then Trimix prescription is sent to the pharmacy and patient is made aware that he needs to bring the medication on the day of procedure as we cannot start the medication.\par \par Risks of Trimix injection as well as penile Doppler ultrasound like prolonged erection from the cavernosal injection of Trimix or embarrassment during the penile Doppler ultrasound were discussed with the patient.\par \par Depending on the results of penile Doppler ultrasound we may recommend that the patient see his cardiologist to have cardiac evaluation especially if blood flow through the cavernosal arteries is abnormal.\par \par If venous leak was found or arteriovenous shunting was identified in we discussed with patient management of these conditions.  Specifically venous leak can be sometimes corrected by modified venous stripping using Hawkins technique.  In everything fails we can place penile prosthesis however this is the last choice and last step in treating of erectile dysfunction.\par \par Use of self injection is often recommended after patient achieve adequate erection during the penile Doppler ultrasound.\par \par Correction of high hemoglobin A1c, high lipids, low testosterone and cessation of smoking are always recommended.\par \par Prescriptions were given.  Side effects were explained.  Patient will follow-up with me as scheduled.\par \par Risk of priapism was explained.\par \par \par total time 42 min\par

## 2022-09-13 NOTE — HISTORY OF PRESENT ILLNESS
[FreeTextEntry1] : 62M w/ hx of PCa any 4+3 s/p RALP. Presents today for ED.\par Pathology reviewed \par no extension\par March 2022 \par Normal erections in the past \par no DM \par no HTN \par no CVA \par \par \par Has been taking 50mg of Sildenafil. Has moderate erections but cant penetrate. Not satisfied with effect.\par \par No nocturnal erections. Better erections with masturbation but still not rigid enough for masturbation. \par \par No penile curvature. Has been using vacuum device. \par \par Orgasm present \par \par Brought his erecaid - didn’t know how to use

## 2022-09-13 NOTE — PHYSICAL EXAM
[Abdomen Soft] : soft [Abdomen Tenderness] : non-tender [Costovertebral Angle Tenderness] : no ~M costovertebral angle tenderness [Edema] : no peripheral edema [] : no respiratory distress [Respiration, Rhythm And Depth] : normal respiratory rhythm and effort [Exaggerated Use Of Accessory Muscles For Inspiration] : no accessory muscle use [Oriented To Time, Place, And Person] : oriented to person, place, and time [Affect] : the affect was normal [Mood] : the mood was normal [Not Anxious] : not anxious [Normal Station and Gait] : the gait and station were normal for the patient's age [No Focal Deficits] : no focal deficits [No Palpable Adenopathy] : no palpable adenopathy [Urethral Meatus] : meatus normal [Urinary Bladder Findings] : the bladder was normal on palpation [Scrotum] : the scrotum was normal [Testes Mass (___cm)] : there were no testicular masses [FreeTextEntry1] : No plaques palpated

## 2022-09-14 ENCOUNTER — APPOINTMENT (OUTPATIENT)
Dept: UROLOGY | Facility: CLINIC | Age: 62
End: 2022-09-14

## 2022-09-14 LAB
CHOLEST SERPL-MCNC: 248 MG/DL
CRP SERPL HS-MCNC: 2.3 MG/L
ESTIMATED AVERAGE GLUCOSE: 94 MG/DL
HBA1C MFR BLD HPLC: 4.9 %
HDLC SERPL-MCNC: 37 MG/DL
LDLC SERPL CALC-MCNC: 164 MG/DL
NONHDLC SERPL-MCNC: 211 MG/DL
TRIGL SERPL-MCNC: 232 MG/DL

## 2022-09-19 LAB
TESTOST FREE SERPL-MCNC: 9.5 PG/ML
TESTOST SERPL-MCNC: 474 NG/DL

## 2022-09-23 ENCOUNTER — APPOINTMENT (OUTPATIENT)
Dept: CARDIOLOGY | Facility: CLINIC | Age: 62
End: 2022-09-23

## 2022-09-23 ENCOUNTER — NON-APPOINTMENT (OUTPATIENT)
Age: 62
End: 2022-09-23

## 2022-09-23 VITALS
OXYGEN SATURATION: 96 % | DIASTOLIC BLOOD PRESSURE: 78 MMHG | SYSTOLIC BLOOD PRESSURE: 130 MMHG | HEART RATE: 66 BPM | RESPIRATION RATE: 16 BRPM | HEIGHT: 67 IN | WEIGHT: 164 LBS | BODY MASS INDEX: 25.74 KG/M2 | TEMPERATURE: 97.2 F

## 2022-09-23 PROCEDURE — 93000 ELECTROCARDIOGRAM COMPLETE: CPT

## 2022-09-23 PROCEDURE — 99204 OFFICE O/P NEW MOD 45 MIN: CPT

## 2022-09-28 RX ORDER — PAPAVER/PHENTOLAMINE/ALPROSTAD 150-5-50
150-5-50 VIAL (EA) INTRACAVERNOSAL
Qty: 1 | Refills: 11 | Status: DISCONTINUED | COMMUNITY
Start: 2022-09-13 | End: 2022-09-28

## 2022-10-19 ENCOUNTER — LABORATORY RESULT (OUTPATIENT)
Age: 62
End: 2022-10-19

## 2022-10-24 ENCOUNTER — RESULT REVIEW (OUTPATIENT)
Age: 62
End: 2022-10-24

## 2022-10-24 ENCOUNTER — APPOINTMENT (OUTPATIENT)
Dept: UROLOGY | Facility: CLINIC | Age: 62
End: 2022-10-24

## 2022-10-24 VITALS
HEART RATE: 65 BPM | WEIGHT: 160 LBS | BODY MASS INDEX: 25.11 KG/M2 | DIASTOLIC BLOOD PRESSURE: 78 MMHG | HEIGHT: 67 IN | SYSTOLIC BLOOD PRESSURE: 118 MMHG | RESPIRATION RATE: 17 BRPM | TEMPERATURE: 97.6 F

## 2022-10-24 DIAGNOSIS — R10.2 PELVIC AND PERINEAL PAIN: ICD-10-CM

## 2022-10-24 PROCEDURE — 99213 OFFICE O/P EST LOW 20 MIN: CPT

## 2022-10-24 NOTE — HISTORY OF PRESENT ILLNESS
[FreeTextEntry1] : 10/24/2022: Reason for Visit: Prostate Cancer follow up \par \par Mr. WHITE is a 62 year old male presenting today s/p roboticassisted laparoscopic radical prostatectomy for prostate cancer pt3b with modified pelvic lymph node dissection on 7/7/22. His PSA on 10/19/2022 was <0.01 ng/mL. Pt reports. Pt denies urinary stress incontinence.has an excellent uriinary stream.  He sees Dr Moore to manage ED. He reports partial erection, but not enough tumescence for penetration. He was prescribed SIldenafil and a penile vacuum pump. He reports a discomfort in the umbilical area, which intensifies when he is standing up. He describes it as a downward pressure. On physical examination, a hernia was not appreciated on palpation.\par \par Assessment: [id number]; Yearly PSA surveillance \par \par Plan:  Pt will have a CT scan to assess hernia.PSA in one year/ He will RTO in a year\par \par \par I counseled the patient. I discussed the various etiologies of his symptoms. Risks and alternatives were discussed. I answered the patient questions. The patient will follow-up as directed and will contact me with any questions or concerns. Thank you for the opportunity to participate in the care of this patient. I'll keep you updated on his progress.

## 2022-10-24 NOTE — PHYSICAL EXAM
[General Appearance - Well Developed] : well developed [General Appearance - Well Nourished] : well nourished [Normal Appearance] : normal appearance [Well Groomed] : well groomed [General Appearance - In No Acute Distress] : no acute distress [Oriented To Time, Place, And Person] : oriented to person, place, and time [Skin Color & Pigmentation] : normal skin color and pigmentation [Affect] : the affect was normal [Mood] : the mood was normal [Not Anxious] : not anxious [Normal Station and Gait] : the gait and station were normal for the patient's age

## 2022-10-26 RX ORDER — OMEPRAZOLE 40 MG/1
40 CAPSULE, DELAYED RELEASE ORAL
Qty: 90 | Refills: 0 | Status: ACTIVE | COMMUNITY
Start: 2022-10-26 | End: 1900-01-01

## 2022-10-27 ENCOUNTER — APPOINTMENT (OUTPATIENT)
Dept: UROLOGY | Facility: CLINIC | Age: 62
End: 2022-10-27

## 2022-10-27 VITALS
HEART RATE: 83 BPM | RESPIRATION RATE: 16 BRPM | WEIGHT: 167 LBS | BODY MASS INDEX: 26.21 KG/M2 | DIASTOLIC BLOOD PRESSURE: 83 MMHG | TEMPERATURE: 97.8 F | OXYGEN SATURATION: 95 % | SYSTOLIC BLOOD PRESSURE: 157 MMHG | HEIGHT: 67 IN

## 2022-10-27 DIAGNOSIS — N48.33 PRIAPISM, DRUG-INDUCED: ICD-10-CM

## 2022-10-27 DIAGNOSIS — Z87.438 PERSONAL HISTORY OF OTHER DISEASES OF MALE GENITAL ORGANS: ICD-10-CM

## 2022-10-27 PROCEDURE — 54235 NJX CORPORA CAVERNOSA RX AGT: CPT

## 2022-10-27 PROCEDURE — 93980 PENILE VASCULAR STUDY: CPT

## 2022-10-27 PROCEDURE — 54220 IRRG CRPRA CAVRNOSA PRIAPISM: CPT

## 2022-10-27 PROCEDURE — 99215 OFFICE O/P EST HI 40 MIN: CPT | Mod: 25

## 2022-10-27 NOTE — HISTORY OF PRESENT ILLNESS
[FreeTextEntry1] : Here for PDUS and follow up of ED \par orals not helping enough for penetration \par \par 62M w/ hx of PCa [place] 4+3 s/p RALP. Presents today for ED.\par Pathology reviewed \par extension extracapsular present and \par seminal vesicles and left posterior midland \par \par RALP July 2022 \par Normal erections in the past \par no DM \par no HTN \par no CVA \par \par No change to 5 mg/20 mg cialis when it comes to quality of erection \par \par \par Has been taking 50mg of Sildenafil. Has moderate erections but cant penetrate. Not satisfied with effect.\par \par No nocturnal erections. Better erections with masturbation but still not rigid enough for masturbation. \par \par No penile curvature. Has been using vacuum device. \par \par Orgasm present \par \par Brought his ErecAid - didn’t know how to use

## 2022-10-27 NOTE — ASSESSMENT
[FreeTextEntry1] : ED \par organic \par denervation injury\par start 10 to 20 units of trimix \par \par Intracavernosal penile injection teaching.\par Patient is here for the penile injection teaching.  He failed oral therapy for erectile dysfunction.\par \par We reviewed risks, benefits, alternatives, complications and the procedure itself.\par \par Specifically we discussed with the patient that the medication used for intracavernosal injection therapy consists of 3 or 4 different medication, specifically prostaglandin E1, papaverine, phentolamine, and occasionally very low dose of atropine.  The medication for penile injection therapy is prepared by the pharmacy.  Only special pharmacy is prepared at medication.  Each of the medication dilates vessels in the penis using different mechanisms.  Only one of the components of that preparation is approved by FDA for penile injection therapy however it has to be used in higher dose which can increase pain and intracavernosal scarring.  The approved component of the Trimix is prostaglandin E1.  It is also known as Caverject.\par \par The medication is injected by the patient in the shaft of the penis on the left or right side.  The appropriate site of the infection injection was demonstrated on the model of the penis.  Patient should not inject into the dorsal aspect of the penis or underneath the penis as he may inject into the vessels or urethra.\par \par The risk of prolonged correction, also numbness priapism was explained to the patient.  If patient has erection more than 2 hours he should take from 2-4 Sudafed's of 30 mg each total 120 mg.  Maximum dose of Sudafed per days 240 mg.  If the erection pressure persists still after Sudafed he needs to contact our office within working hours or go to the nearest ER.\par \par Patient should never have erection longer than 4 hours.  Irreversible damage to the penis can occur if priapism persists.  Patient understood the risk of priapism.\par \par Prescription was given.  Prescription for syringes was given as well.  Patient will contact us with any questions.  \par \par Total time spend teaching the patient 41 min \par \par Developed priapism for 1 h \par required injection and drainage of priapism performed by me in office \par \par I called patient after an hour and erection resolved \par \par Upon review of pathology findings suggestive of neurovascular bundle resection \par \par Lipids - abnormal - see Dr. Marley \par \par \par Dear Dr. Cameron \par I saw him today for PDUS. \par It is interesting, but he has classic denervation injury\par Developed priapism with standard 20 units of Trimix which in most of RALP patients would result in minimal to moderate response with detumescence within 30 min. \par \par I had to drain and inject him. \par No to a minimal response from oral medications with paradoxical priapism after a low dose of trimix - is almost pathognomic for denervation injury.\par \par Overall not the best sign for ED recovery, but he is early in the process and with a good sense of humor, \par He will do low-dose ICI 5 to 10 units max.

## 2022-10-28 RX ORDER — PHENYLEPHRINE HCL IN 0.9% NACL 0.5 MG/5ML
0.5-0.9 SYRINGE (ML) INTRAVENOUS
Refills: 0 | Status: COMPLETED | OUTPATIENT
Start: 2022-10-28

## 2022-11-01 ENCOUNTER — APPOINTMENT (OUTPATIENT)
Dept: CT IMAGING | Facility: IMAGING CENTER | Age: 62
End: 2022-11-01

## 2022-11-01 ENCOUNTER — OUTPATIENT (OUTPATIENT)
Dept: OUTPATIENT SERVICES | Facility: HOSPITAL | Age: 62
LOS: 1 days | End: 2022-11-01
Payer: COMMERCIAL

## 2022-11-01 DIAGNOSIS — Z98.890 OTHER SPECIFIED POSTPROCEDURAL STATES: Chronic | ICD-10-CM

## 2022-11-01 DIAGNOSIS — R10.2 PELVIC AND PERINEAL PAIN: ICD-10-CM

## 2022-11-01 PROCEDURE — 74150 CT ABDOMEN W/O CONTRAST: CPT

## 2022-11-01 PROCEDURE — 74150 CT ABDOMEN W/O CONTRAST: CPT | Mod: 26

## 2022-11-02 ENCOUNTER — NON-APPOINTMENT (OUTPATIENT)
Age: 62
End: 2022-11-02

## 2022-11-02 DIAGNOSIS — R06.83 SNORING: ICD-10-CM

## 2022-11-04 ENCOUNTER — APPOINTMENT (OUTPATIENT)
Dept: CARDIOLOGY | Facility: CLINIC | Age: 62
End: 2022-11-04

## 2022-11-04 VITALS
DIASTOLIC BLOOD PRESSURE: 83 MMHG | BODY MASS INDEX: 26.21 KG/M2 | HEIGHT: 67 IN | OXYGEN SATURATION: 96 % | SYSTOLIC BLOOD PRESSURE: 129 MMHG | TEMPERATURE: 98 F | RESPIRATION RATE: 16 BRPM | WEIGHT: 167 LBS | HEART RATE: 74 BPM

## 2022-11-04 DIAGNOSIS — R06.09 OTHER FORMS OF DYSPNEA: ICD-10-CM

## 2022-11-04 PROCEDURE — 99213 OFFICE O/P EST LOW 20 MIN: CPT | Mod: 25

## 2022-11-04 PROCEDURE — 93015 CV STRESS TEST SUPVJ I&R: CPT

## 2022-11-04 PROCEDURE — 93306 TTE W/DOPPLER COMPLETE: CPT

## 2022-11-04 RX ORDER — SILDENAFIL 100 MG/1
100 TABLET, FILM COATED ORAL
Qty: 30 | Refills: 3 | Status: COMPLETED | COMMUNITY
Start: 2022-07-21 | End: 2022-11-04

## 2022-11-04 RX ORDER — SILDENAFIL 50 MG/1
50 TABLET ORAL
Qty: 30 | Refills: 3 | Status: COMPLETED | COMMUNITY
Start: 2022-07-21 | End: 2022-11-04

## 2022-11-04 RX ORDER — PSEUDOEPHEDRINE HCL 30 MG/1
30 TABLET, FILM COATED ORAL
Qty: 60 | Refills: 1 | Status: COMPLETED | COMMUNITY
Start: 2022-10-27 | End: 2022-11-04

## 2022-11-04 RX ORDER — CALCIUM CARB/VITAMIN D3/VIT K1 500-100-40
31G X 5/16" TABLET,CHEWABLE ORAL
Qty: 50 | Refills: 4 | Status: COMPLETED | COMMUNITY
Start: 2022-10-27 | End: 2022-11-04

## 2022-11-04 NOTE — REASON FOR VISIT
[CV Risk Factors and Non-Cardiac Disease] : CV risk factors and non-cardiac disease [Hyperlipidemia] : hyperlipidemia [FreeTextEntry3] : Dr. Mishra [FreeTextEntry1] : This is a 62 year old male patient with past medical history of asthma, GERD, prostate cancer s/p radical prostatectomy, s/p B/L knee surgery who presents today for [facility] cardiac prevention consultation.\par \par Patient was referred by his urologist Dr. Mishra who diagnosed him with erectile dysfunction. Patient was treated with Tri-mix intracavernosal solution injection and he is prescribed Cialis 5mg four times a week on alternating days with Cialis 20mg three times a week.\par \par Patient's bloodwork from his visit with Dr. Mishra on 9/13/22 demonstrated cholesterol 248, triglyceride 232, HDL 37, LDL calculated 164, non- and A1C 4.9%. \par \par Patient states he is generally feeling well today and he denies chest pain, SOB, dizziness, lightheadedness, headache, and recent episode of syncope. However, patient does report occasional dyspnea on exertion. \par \par As part of his workup, patient will schedule a coronary artery calcium score.

## 2022-11-04 NOTE — PHYSICAL EXAM
[Well Developed] : well developed [Well Nourished] : well nourished [No Acute Distress] : no acute distress [Normal Conjunctiva] : normal conjunctiva [Normal Venous Pressure] : normal venous pressure [No Carotid Bruit] : no carotid bruit [Normal S1, S2] : normal S1, S2 [No Rub] : no rub [5th Left ICS - MCL] : palpated at the 5th LICS in the midclavicular line [Normal] : normal [No Precordial Heave] : no precordial heave was noted [Normal Rate] : normal [Normal S1] : normal S1 [Normal S2] : normal S2 [No Gallop] : no gallop heard [I] : a grade 1 [No Pitting Edema] : no pitting edema present [2+] : left 2+ [No Abnormalities] : the abdominal aorta was not enlarged and no bruit was heard [Clear Lung Fields] : clear lung fields [Good Air Entry] : good air entry [No Respiratory Distress] : no respiratory distress  [Soft] : abdomen soft [Non Tender] : non-tender [No Masses/organomegaly] : no masses/organomegaly [Normal Bowel Sounds] : normal bowel sounds [Normal Gait] : normal gait [No Edema] : no edema [No Cyanosis] : no cyanosis [No Clubbing] : no clubbing [No Varicosities] : no varicosities [No Rash] : no rash [No Skin Lesions] : no skin lesions [Moves all extremities] : moves all extremities [No Focal Deficits] : no focal deficits [Normal Speech] : normal speech [Alert and Oriented] : alert and oriented [Normal memory] : normal memory [S3] : no S3 [S4] : no S4 [Right Carotid Bruit] : no bruit heard over the right carotid [Left Carotid Bruit] : no bruit heard over the left carotid [Right Femoral Bruit] : no bruit heard over the right femoral artery [Left Femoral Bruit] : no bruit heard over the left femoral artery

## 2022-11-04 NOTE — REASON FOR VISIT
[CV Risk Factors and Non-Cardiac Disease] : CV risk factors and non-cardiac disease [Hyperlipidemia] : hyperlipidemia [FreeTextEntry3] : Dr. Mishra [FreeTextEntry1] : This is a 62 year old male patient with past medical history of asthma, GERD, prostate cancer s/p radical prostatectomy, s/p B/L knee surgery who presents today for [facility] cardiac prevention consultation.\par \par Patient was referred by his urologist Dr. Mishra who diagnosed him with erectile dysfunction. Patient was treated with Tri-mix intracavernosal solution injection and he is prescribed Cialis 5mg four times a week on alternating days with Cialis 20mg three times a week.\par \par Patient's bloodwork from his visit with Dr. Mishra on 9/13/22 demonstrated cholesterol 248, triglyceride 232, HDL 37, LDL calculated 164, non- and A1C 4.9%. \par \par Patient states he is generally feeling well today and he denies chest pain, SOB, dizziness, lightheadedness, headache, and recent episode of syncope. However, patient does report occasional dyspnea on exertion. \par \par As part of his workup, patient will schedule a coronary artery calcium score.

## 2022-11-04 NOTE — DISCUSSION/SUMMARY
[FreeTextEntry1] : This is a 62-year-old male with past medical history significant for asthma, prostate cancer status post radical prostatectomy, erectile dysfunction, status post bilateral knee surgery, GERD, who comes in for men's health cardiac prevention program consultation.\par He denies chest pain, shortness of breath, dizziness or syncope.  He may get occasional dyspnea on exertion\par He was born in [place] and has no history of rheumatic fever.  He does not drink excessive caffeine or alcohol.\par His cardiac risk factors include hypercholesterolemia.\par Electrocardiogram done September 23, 2022 demonstrated normal sinus rhythm rate 66 bpm is otherwise unremarkable.\par Lipid panel done September 13, 2022 demonstrated cholesterol 248, triglycerides 232, HDL of 37, LDL calculated 164 mg/dL and non-HDL cholesterol 211 mg/dL.\par The patient had been prescribed Lipitor in the past but felt that he can control his cholesterol with diet.\par The patient will start on Crestor 20 mg daily for primary prevention.\par We will schedule coronary artery calcium score to further evaluate his cardiovascular risk.\par He will schedule an exercise stress test to rule out significant coronary artery disease.\par He will schedule an echo Doppler examination to evaluate his murmur, left ventricular function, chamber size, and rule out hypertrophy.\par He will follow-up with me after above-noted diagnostic tests are completed.\par I have also recommended he work with our registered dietitian to improve his lipid profile and caloric intake.\par He will follow-up with his urologist regarding his erectile dysfunction and follow-up with me as noted above.\par The patient understands that aerobic exercises must be increased to 40 minutes 4 times per week. A detailed discussion of lifestyle modification was done today. The patient has a good understanding of the diagnosis, and treatment plan. Lifestyle modification was also outlined.\par \par Thank you for allowing to participate in care of your patient.  I look forward to working with you again in the future.\par

## 2022-11-04 NOTE — DISCUSSION/SUMMARY
[FreeTextEntry1] : This is a 62-year-old male with past medical history significant for asthma, prostate cancer status post radical prostatectomy, erectile dysfunction, status post bilateral knee surgery, GERD, who comes in for men's health cardiac prevention program follow-up evaluation.\par He denies chest pain, shortness of breath, dizziness or syncope.  He may get occasional dyspnea on exertion\par He was born in [place] and has no history of rheumatic fever.  He does not drink excessive caffeine or alcohol.\par His cardiac risk factors include hypercholesterolemia.\par The patient had a normal exercise stress test November 4, 2022:\par The patient will continue his current diet and exercise program.  He will have new blood work done prior to next visit.\par The patient may require ventral hernia surgery.  He has prior abdominal surgical scars and now reports that his intestines are protruding.  He is being evaluated by surgery in the next few weeks.\par Electrocardiogram done September 23, 2022 demonstrated normal sinus rhythm rate 66 bpm is otherwise unremarkable.\par Lipid panel done September 13, 2022 demonstrated cholesterol 248, triglycerides 232, HDL of 37, LDL calculated 164 mg/dL and non-HDL cholesterol 211 mg/dL.\par The patient had been prescribed Lipitor in the past but felt that he can control his cholesterol with diet.\par The patient will start on Crestor 20 mg daily for primary prevention.\par I have also recommended he work with our registered dietitian to improve his lipid profile and caloric intake.\par He will follow-up with his urologist regarding his erectile dysfunction and follow-up with me as noted above.\par The patient understands that aerobic exercises must be increased to 40 minutes 4 times per week. A detailed discussion of lifestyle modification was done today. The patient has a good understanding of the diagnosis, and treatment plan. Lifestyle modification was also outlined.\par \par Thank you for allowing to participate in care of your patient.  I look forward to working with you again in the future.\par

## 2022-11-09 ENCOUNTER — APPOINTMENT (OUTPATIENT)
Dept: SURGERY | Facility: CLINIC | Age: 62
End: 2022-11-09

## 2022-11-09 VITALS
OXYGEN SATURATION: 96 % | BODY MASS INDEX: 26.92 KG/M2 | HEIGHT: 67 IN | SYSTOLIC BLOOD PRESSURE: 124 MMHG | TEMPERATURE: 98.1 F | HEART RATE: 67 BPM | WEIGHT: 171.5 LBS | DIASTOLIC BLOOD PRESSURE: 81 MMHG

## 2022-11-09 PROCEDURE — 99203 OFFICE O/P NEW LOW 30 MIN: CPT

## 2022-11-14 ENCOUNTER — EMERGENCY (EMERGENCY)
Facility: HOSPITAL | Age: 62
LOS: 1 days | Discharge: LEFT BEFORE DISCHARGE | End: 2022-11-14
Admitting: EMERGENCY MEDICINE

## 2022-11-14 VITALS
DIASTOLIC BLOOD PRESSURE: 99 MMHG | SYSTOLIC BLOOD PRESSURE: 148 MMHG | OXYGEN SATURATION: 100 % | HEART RATE: 99 BPM | RESPIRATION RATE: 18 BRPM | TEMPERATURE: 98 F

## 2022-11-14 DIAGNOSIS — Z98.890 OTHER SPECIFIED POSTPROCEDURAL STATES: Chronic | ICD-10-CM

## 2022-11-14 PROCEDURE — L9991: CPT

## 2022-11-14 NOTE — ED ADULT TRIAGE NOTE - CHIEF COMPLAINT QUOTE
Pt c/o priapism x 4 hours. Had first time papaverine injection for erectile dysfunction today at 6pm, took sudafed with no relief. Prostates surgery in July. hx of prostate cancer, asthma

## 2022-11-14 NOTE — ED ADULT TRIAGE NOTE - EXPLANATION OF PATIENT'S REASON FOR LEAVING
Patient states no longer has a erection, denies any pain and discomfort. Patient encouraged to stay in case of rebound, states will return if necessary

## 2022-11-15 ENCOUNTER — TRANSCRIPTION ENCOUNTER (OUTPATIENT)
Age: 62
End: 2022-11-15

## 2022-11-18 ENCOUNTER — TRANSCRIPTION ENCOUNTER (OUTPATIENT)
Age: 62
End: 2022-11-18

## 2022-11-27 PROBLEM — R06.09 DYSPNEA ON EXERTION: Status: ACTIVE | Noted: 2022-09-23

## 2022-12-08 ENCOUNTER — OUTPATIENT (OUTPATIENT)
Dept: OUTPATIENT SERVICES | Facility: HOSPITAL | Age: 62
LOS: 1 days | Discharge: HOME | End: 2022-12-08

## 2022-12-08 VITALS
RESPIRATION RATE: 17 BRPM | TEMPERATURE: 97 F | HEIGHT: 67 IN | DIASTOLIC BLOOD PRESSURE: 90 MMHG | OXYGEN SATURATION: 96 % | WEIGHT: 179.68 LBS | HEART RATE: 68 BPM | SYSTOLIC BLOOD PRESSURE: 135 MMHG

## 2022-12-08 DIAGNOSIS — K43.2 INCISIONAL HERNIA WITHOUT OBSTRUCTION OR GANGRENE: ICD-10-CM

## 2022-12-08 DIAGNOSIS — Z01.818 ENCOUNTER FOR OTHER PREPROCEDURAL EXAMINATION: ICD-10-CM

## 2022-12-08 DIAGNOSIS — Z98.890 OTHER SPECIFIED POSTPROCEDURAL STATES: Chronic | ICD-10-CM

## 2022-12-08 LAB
ANION GAP SERPL CALC-SCNC: 5 MMOL/L — SIGNIFICANT CHANGE UP (ref 5–17)
BASOPHILS # BLD AUTO: 0.07 K/UL — SIGNIFICANT CHANGE UP (ref 0–0.2)
BASOPHILS NFR BLD AUTO: 1.2 % — SIGNIFICANT CHANGE UP (ref 0–2)
BUN SERPL-MCNC: 19 MG/DL — SIGNIFICANT CHANGE UP (ref 7–23)
CALCIUM SERPL-MCNC: 9.2 MG/DL — SIGNIFICANT CHANGE UP (ref 8.5–10.1)
CHLORIDE SERPL-SCNC: 110 MMOL/L — HIGH (ref 96–108)
CO2 SERPL-SCNC: 26 MMOL/L — SIGNIFICANT CHANGE UP (ref 22–31)
CREAT SERPL-MCNC: 1.03 MG/DL — SIGNIFICANT CHANGE UP (ref 0.5–1.3)
EGFR: 83 ML/MIN/1.73M2 — SIGNIFICANT CHANGE UP
EOSINOPHIL # BLD AUTO: 0.22 K/UL — SIGNIFICANT CHANGE UP (ref 0–0.5)
EOSINOPHIL NFR BLD AUTO: 3.9 % — SIGNIFICANT CHANGE UP (ref 0–6)
GLUCOSE SERPL-MCNC: 97 MG/DL — SIGNIFICANT CHANGE UP (ref 70–99)
HCT VFR BLD CALC: 43.2 % — SIGNIFICANT CHANGE UP (ref 39–50)
HGB BLD-MCNC: 14.1 G/DL — SIGNIFICANT CHANGE UP (ref 13–17)
IMM GRANULOCYTES NFR BLD AUTO: 0.4 % — SIGNIFICANT CHANGE UP (ref 0–0.9)
LYMPHOCYTES # BLD AUTO: 2.3 K/UL — SIGNIFICANT CHANGE UP (ref 1–3.3)
LYMPHOCYTES # BLD AUTO: 40.9 % — SIGNIFICANT CHANGE UP (ref 13–44)
MCHC RBC-ENTMCNC: 31.5 PG — SIGNIFICANT CHANGE UP (ref 27–34)
MCHC RBC-ENTMCNC: 32.6 G/DL — SIGNIFICANT CHANGE UP (ref 32–36)
MCV RBC AUTO: 96.6 FL — SIGNIFICANT CHANGE UP (ref 80–100)
MONOCYTES # BLD AUTO: 0.58 K/UL — SIGNIFICANT CHANGE UP (ref 0–0.9)
MONOCYTES NFR BLD AUTO: 10.3 % — SIGNIFICANT CHANGE UP (ref 2–14)
NEUTROPHILS # BLD AUTO: 2.44 K/UL — SIGNIFICANT CHANGE UP (ref 1.8–7.4)
NEUTROPHILS NFR BLD AUTO: 43.3 % — SIGNIFICANT CHANGE UP (ref 43–77)
NRBC # BLD: 0 /100 WBCS — SIGNIFICANT CHANGE UP (ref 0–0)
PLATELET # BLD AUTO: 251 K/UL — SIGNIFICANT CHANGE UP (ref 150–400)
POTASSIUM SERPL-MCNC: 3.8 MMOL/L — SIGNIFICANT CHANGE UP (ref 3.5–5.3)
POTASSIUM SERPL-SCNC: 3.8 MMOL/L — SIGNIFICANT CHANGE UP (ref 3.5–5.3)
RBC # BLD: 4.47 M/UL — SIGNIFICANT CHANGE UP (ref 4.2–5.8)
RBC # FLD: 11.8 % — SIGNIFICANT CHANGE UP (ref 10.3–14.5)
SODIUM SERPL-SCNC: 141 MMOL/L — SIGNIFICANT CHANGE UP (ref 135–145)
WBC # BLD: 5.63 K/UL — SIGNIFICANT CHANGE UP (ref 3.8–10.5)
WBC # FLD AUTO: 5.63 K/UL — SIGNIFICANT CHANGE UP (ref 3.8–10.5)

## 2022-12-08 PROCEDURE — 93010 ELECTROCARDIOGRAM REPORT: CPT

## 2022-12-08 NOTE — H&P PST ADULT - NSICDXPASTSURGICALHX_GEN_ALL_CORE_FT
PAST SURGICAL HISTORY:  H/O arthroscopy of left knee 8 years ago    H/O arthroscopy of right knee 5 years ago    History of prostate surgery     S/P anal fissurectomy

## 2022-12-08 NOTE — H&P PST ADULT - ASSESSMENT
incisional abdominal hernia  CAPRINI SCORE    AGE RELATED RISK FACTORS                                                       MOBILITY RELATED FACTORS  [x ] Age 41-60 years                                            (1 Point)                  [ ] Bed rest                                                        (1 Point)  [ ] Age: 61-74 years                                           (2 Points)                [ ] Plaster cast                                                   (2 Points)  [ ] Age= 75 years                                              (3 Points)                 [ ] Bed bound for more than 72 hours                   (2 Points)    DISEASE RELATED RISK FACTORS                                               GENDER SPECIFIC FACTORS  [ ] Edema in the lower extremities                       (1 Point)                  [ ] Pregnancy                                                     (1 Point)  [ ] Varicose veins                                               (1 Point)                  [ ] Post-partum < 6 weeks                                   (1 Point)             x ] BMI > 25 Kg/m2                                            (1 Point)                  [ ] Hormonal therapy  or oral contraception            (1 Point)                 [ ] Sepsis (in the previous month)                        (1 Point)                  [ ] History of pregnancy complications  [ ] Pneumonia or serious lung disease                                               [ ] Unexplained or recurrent                       (1 Point)           (in the previous month)                               (1 Point)  [ ] Abnormal pulmonary function test                     (1 Point)                 SURGERY RELATED RISK FACTORS  [ ] Acute myocardial infarction                              (1 Point)                 [ ]  Section                                            (1 Point)  [ ] Congestive heart failure (in the previous month)  (1 Point)                 [ ] Minor surgery                                                 (1 Point)   [ ] Inflammatory bowel disease                             (1 Point)                 [ ] Arthroscopic surgery                                        (2 Points)  [ ] Central venous access                                    (2 Points)                x[ ] General surgery lasting more than 45 minutes   (2 Points)       [ ] Stroke (in the previous month)                          (5 Points)               [ ] Elective arthroplasty                                        (5 Points)                                                                                                                                               HEMATOLOGY RELATED FACTORS                                                 TRAUMA RELATED RISK FACTORS  [ ] Prior episodes of VTE                                     (3 Points)                 [ ] Fracture of the hip, pelvis, or leg                       (5 Points)  [ ] Positive family history for VTE                         (3 Points)                 [ ] Acute spinal cord injury (in the previous month)  (5 Points)  [ ] Prothrombin 77903 A                                      (3 Points)                 [ ] Paralysis  (less than 1 month)                          (5 Points)  [ ] Factor V Leiden                                             (3 Points)                 [ ] Multiple Trauma within 1 month                         (5 Points)  [ ] Lupus anticoagulants                                     (3 Points)                                                           [ ] Anticardiolipin antibodies                                (3 Points)                                                       [ ] High homocysteine in the blood                      (3 Points)                                             [ ] Other congenital or acquired thrombophilia       (3 Points)                                                [ ] Heparin induced thrombocytopenia                  (3 Points)                                          Total Score [  4        ]  Caprini Score 0-2: Low risk, No VTE Prophylaxis required for most patient's, encourage ambulation  Caprini Score 3-6: At Risk, Pharmacologic VTE prophylaxis is indicated for most patients ( in the absence of a contraindication)  Caprini Score Greater than or = 7: High Risk , pharmacologic VTE is indicated for most patients ( in the absence of a contraindication)    Caprini score indicates that the patient is high risk for VTE event ( score 6 or greater). Surgical patient's in this group will benefit from both pharmacologic prophylaxis and intermittent compression devices . Surgical team will determine the balance between VTE  risk and bleeding risk and other clinical considerations

## 2022-12-08 NOTE — H&P PST ADULT - HISTORY OF PRESENT ILLNESS
61 yo male with incisional abdominal hernia - scheduled for incisional hernia repair   denies recent travels in the past 30 days. No fever, SOB, cough, flu like symptoms or body rash- covid screen  covid vaccine completed

## 2022-12-08 NOTE — H&P PST ADULT - PROBLEM SELECTOR PLAN 2
Preop instructions provided including NPO status. Hibiclens wash for infection control. Patient aware to stop NSAID, OTC herbals  for 7-10 days, needs to be accompanied  by adult upon discharge.  Patient verbalized understanding  patient instructed on having covid19 swab 3-5 days prior to surgery

## 2022-12-09 RX ORDER — SODIUM CHLORIDE 9 MG/ML
3 INJECTION INTRAMUSCULAR; INTRAVENOUS; SUBCUTANEOUS EVERY 8 HOURS
Refills: 0 | Status: DISCONTINUED | OUTPATIENT
Start: 2022-12-22 | End: 2023-01-05

## 2022-12-21 ENCOUNTER — TRANSCRIPTION ENCOUNTER (OUTPATIENT)
Age: 62
End: 2022-12-21

## 2022-12-22 ENCOUNTER — OUTPATIENT (OUTPATIENT)
Dept: OUTPATIENT SERVICES | Facility: HOSPITAL | Age: 62
LOS: 1 days | Discharge: HOME | End: 2022-12-22

## 2022-12-22 ENCOUNTER — APPOINTMENT (OUTPATIENT)
Dept: SURGERY | Facility: HOSPITAL | Age: 62
End: 2022-12-22

## 2022-12-22 ENCOUNTER — TRANSCRIPTION ENCOUNTER (OUTPATIENT)
Age: 62
End: 2022-12-22

## 2022-12-22 VITALS
RESPIRATION RATE: 16 BRPM | HEART RATE: 60 BPM | OXYGEN SATURATION: 98 % | SYSTOLIC BLOOD PRESSURE: 124 MMHG | DIASTOLIC BLOOD PRESSURE: 81 MMHG | TEMPERATURE: 98 F

## 2022-12-22 VITALS
DIASTOLIC BLOOD PRESSURE: 84 MMHG | TEMPERATURE: 98 F | WEIGHT: 164.91 LBS | HEART RATE: 65 BPM | OXYGEN SATURATION: 95 % | HEIGHT: 67 IN | RESPIRATION RATE: 16 BRPM | SYSTOLIC BLOOD PRESSURE: 126 MMHG

## 2022-12-22 DIAGNOSIS — Z98.890 OTHER SPECIFIED POSTPROCEDURAL STATES: Chronic | ICD-10-CM

## 2022-12-22 PROCEDURE — 49560: CPT | Mod: GC

## 2022-12-22 PROCEDURE — 49568: CPT

## 2022-12-22 DEVICE — MESH VENTRAL PATCH 6.6 CM: Type: IMPLANTABLE DEVICE | Status: FUNCTIONAL

## 2022-12-22 RX ORDER — OXYCODONE HYDROCHLORIDE 5 MG/1
1 TABLET ORAL
Qty: 8 | Refills: 0
Start: 2022-12-22

## 2022-12-22 RX ORDER — SODIUM CHLORIDE 9 MG/ML
1000 INJECTION, SOLUTION INTRAVENOUS
Refills: 0 | Status: DISCONTINUED | OUTPATIENT
Start: 2022-12-22 | End: 2022-12-22

## 2022-12-22 RX ORDER — ALPRAZOLAM 0.25 MG
1 TABLET ORAL
Qty: 0 | Refills: 0 | DISCHARGE

## 2022-12-22 RX ORDER — FENTANYL CITRATE 50 UG/ML
50 INJECTION INTRAVENOUS
Refills: 0 | Status: DISCONTINUED | OUTPATIENT
Start: 2022-12-22 | End: 2022-12-22

## 2022-12-22 RX ORDER — DUPILUMAB 300 MG/2ML
0 INJECTION, SOLUTION SUBCUTANEOUS
Qty: 0 | Refills: 0 | DISCHARGE

## 2022-12-22 RX ORDER — OMEPRAZOLE 10 MG/1
1 CAPSULE, DELAYED RELEASE ORAL
Qty: 0 | Refills: 0 | DISCHARGE

## 2022-12-22 RX ORDER — ONDANSETRON 8 MG/1
4 TABLET, FILM COATED ORAL ONCE
Refills: 0 | Status: DISCONTINUED | OUTPATIENT
Start: 2022-12-22 | End: 2022-12-22

## 2022-12-22 RX ORDER — ALUMINUM HYDROXIDE AND MAGNESIUM TRISILICATE 80; 14.2 MG/1; MG/1
2 TABLET, CHEWABLE ORAL
Qty: 0 | Refills: 0 | DISCHARGE

## 2022-12-22 NOTE — ASU DISCHARGE PLAN (ADULT/PEDIATRIC) - ASU DC SPECIAL INSTRUCTIONSFT
ice packs on-off for 24hrs    f/u 1 week with Dr. Singh      Take 1000mg tylenol + 400mg motrin or advil every 6hrs for pain. Add 1 oxycodone if needed for severe pain

## 2022-12-22 NOTE — ASU PATIENT PROFILE, ADULT - MEDICATION ADMINISTRATION INFO, PROFILE
Patient: Randall Tang    Procedure Summary     Date:  06/16/20 Room / Location:  [facility] ENDOSCOPY 1 /  [facility]    Anesthesia Start:  1106 Anesthesia Stop:  1115    Procedure:  Esophagogastroduodenoscopy with possible biopsy, polypectomy, dilation (N/A Esophagus) Diagnosis:       Other dysphagia      Bloating      Breast mass      (Other dysphagia [R13.19])      (Bloating [R14.0])      (Breast mass [N63.0])    Surgeon:  Karlee Rao DO Provider:  Ayush Reece CRNA    Anesthesia Type:  MAC ASA Status:  2          Anesthesia Type: MAC    Vitals  Vitals Value Taken Time   /67 6/16/2020 12:00 PM   Temp     Pulse 74 6/16/2020 12:00 PM   Resp 12 6/16/2020 11:43 AM   SpO2 99 % 6/16/2020 12:00 PM           Post Anesthesia Care and Evaluation    Patient location during evaluation: PHASE II  Patient participation: complete - patient participated  Level of consciousness: awake and alert  Pain score: 0  Pain management: adequate  Airway patency: patent  Anesthetic complications: No anesthetic complications    Cardiovascular status: acceptable  Respiratory status: acceptable  Hydration status: acceptable       no concerns

## 2022-12-22 NOTE — ASU DISCHARGE PLAN (ADULT/PEDIATRIC) - NS MD DC FALL RISK RISK
For information on Fall & Injury Prevention, visit: [email or web address] OR  [email or web address] OR  https://www.cdc.gov/steadi/patient.html

## 2022-12-22 NOTE — BRIEF OPERATIVE NOTE - NSICDXBRIEFPROCEDURE_GEN_ALL_CORE_FT
PROCEDURES:  Open repair of incisional hernia using synthetic mesh 22-Dec-2022 07:57:11  Austin Patricio

## 2022-12-24 DIAGNOSIS — K43.2 INCISIONAL HERNIA WITHOUT OBSTRUCTION OR GANGRENE: ICD-10-CM

## 2022-12-26 ENCOUNTER — NON-APPOINTMENT (OUTPATIENT)
Age: 62
End: 2022-12-26

## 2022-12-27 ENCOUNTER — APPOINTMENT (OUTPATIENT)
Dept: PULMONOLOGY | Facility: CLINIC | Age: 62
End: 2022-12-27

## 2022-12-27 PROCEDURE — 99214 OFFICE O/P EST MOD 30 MIN: CPT | Mod: 95

## 2022-12-27 RX ORDER — AZITHROMYCIN 250 MG/1
250 TABLET, FILM COATED ORAL
Qty: 6 | Refills: 0 | Status: DISCONTINUED | COMMUNITY
Start: 2022-12-15

## 2022-12-27 NOTE — ASSESSMENT
[FreeTextEntry1] : Mr. WHITE is a 62 year old male with a history of originally from [place], cervical facet syndrome, deviated septum, chronic sinus issues, herniated disk, GERD, allergies, HLD, eosinophilic asthma, elevated IgE, allergy, GERD, bronchomalacia who comes into the office today via video call for pulmonary evaluation for chronic cough, wheezing, and SOB - improved (at baseline) - s/p sinus surgery (deviated septum)/ prostate surgery (cancer)/ hernia surgery- stable\par \par The patient's shortness of breath is multifactorial due to:\par -pulmonary disease \par      -severe persistent asthma\par      -eosinophilic asthma\par      -steroid dependent asthma\par      -Tracheomalacia\par -poor breathing mechanics \par -overweight/out of shape\par -?cardiac disease (doubt)\par \par Problem 1: Severe Persistent Asthma (improved)\par -continue Singulair 10 mg before bed \par -continue Trelegy 200 1 inhalation QD or Breo Ellipta 200 at 1 inhalation QD (whichever insurance allows0\par -continue Ventolin rescue inhaler 2 inhalations before exercise, Q6H \par -Asthma is believed to be caused by inherited (genetic) and environmental factor, but its exact cause is unknown. Asthma may be triggered by allergens, lung infections, or irritants in the air. Asthma triggers are different for each person\par -Inhaler technique reviewed as well as oral hygiene techniques reviewed with patient. Avoidance of cold air, extremes of temperature, rescue inhaler should be used before exercise. Order of medication reviewed with patient. Recommended use of a cool mist humidifier in the bedroom.\par \par Problem 2: Steroid Dependent Asthma (controlled)\par -Patient is a candidate for Dupixent. S/p Dupixent 12/2020 - move to [id number] or [id number]\par -Dupixent is a prescription medicine used with other asthma medicines for the maintenance treatment of moderate-to-severe asthma in people aged 12 years and older whose asthma is not controlled with their current asthma medicines. Dupixent helps prevent severe asthma attacks (exacerbations) and can improve your breathing. Dupixent may also help reduce the amount of oral corticosteroids you need while preventing severe asthma attacks and improving your breathing. Dupixent is not used to treat sudden breathing problems. Risks and side effect of Dupixent were discussed and reviewed with patient.\par \par Problem 3: Eosinophilic asthma\par -Patient is a candidate for Nucala or Fasenra\par -The safety and efficacy of Nucala was established in three double-blind, randomized, placebo-controlled trials in patients with severe asthma. Compared to a placebo, patients with severe asthma receiving Nucala had fewer exacerbation requiring hospitalization and/or emergency department visits, and a longer time to first exacerbation. In addition, patients with severe asthma receiving Nucala or Fasenra experienced greater reductions in their daily maintenance oral corticosteroid dose, while maintaining asthma control compared with patients receiving placebo. Treatment with Nucala did not result in a significant improvement in lung function, as measured by the volume of air exhaled by patients in one second. The most common side effects include: headache, injection site reactions, back pain, weakness, and fatigue; hypersensitivity reactions can occur within hours or days including swelling of the face, mouth, and tongue, fainting, dizziness, hives, breathing problems, and rash; herpes zoster infections have occurred. The drug is a monoclonal antibody that inhibits interleukin-5 which helps regular eosinophils, a type of white blood cell that contributes to asthma. The over-production of eosinophils can cause inflammation in the lungs, increasing the frequency of asthma attacks. Patients must also take other medications, including high dose inhaled corticosteroids and at least one additional asthma drug.\par \par Problem 4: Chronic Allergy / Sinus\par -continue Xhance 1 sniff BID - move to once per day\par -continue Olopatadine 0.6% at 1 sniff/nostril BID \par -continue Xyzal 5 mg qHS \par Environmental measures for allergies were encouraged including mattress and pillow cover, air purifier, and environmental controls. \par \par Problem 4A: Deviated Septum\par -Dr. Villa \par \par Problem 5: Bronchomalacia (quiet)\par -s/p dynamic Ct of the chest with Dr. Hernandez\par Tracheomalacia is usually acquired in adults and common causes include damage by tracheostomy or endotracheal intubation damaging the tracheal cartilage with increase risk with multiple intubations, prolonged intubation, and concurrent high dose steroid therapy; external chest wall trauma and surgery; chronic compression of the trachea by benign etiologies (eg, benign mediastinal goiter) or malignancy; relapsing polychondritis; or recurrent infection. Tracheomalacia can be asymptomatic, however signs or symptoms can develop as the severity of the airway narrowing progresses with major symptoms include dyspnea, cough, and sputum retention. Other symptoms include severe paroxysms of coughing, wheezing or stridor, barking cough and may be exacerbated by forced expiration, cough, and valsalva maneuver. Tracheomalacia is diagnosed by a bronchoscopic visualization of dynamic airway collapse on dynamic chest CT. Therapy is warranted in symptomatic patients with severe tracheomalacia and includes surgical repair as tracheobronchoplasty. The patient was referred to Dr. Zaid Hernandez or Dr. Allen Fuentes, at [facility] for a surgical consult. \par \par Problem 6: GERD\par -continue Protonix 40 mg before breakfast \par -Rule of 2s: avoid eating too much, eating too fast, eating too late, eating too spicy, eating too lousy, eating two hours before bed.\par -Things to avoid including overeating, spicy foods, tight clothing, eating within three hours of bed, this list is not all inclusive. \par -For treatment of reflux, possible options discussed including diet control, H2 blockers, PPIs, as well as coating motility agents discussed as treatment options. Timing of meals and proximity of last meal to sleep were discussed. If symptoms persist, a formal gastrointestinal evaluation is needed.\par \par Problem 7: R/o PIO (? present)\par -Recommended to complete a home sleep study due to elevated MP class, snoring, large neck size\par Sleep apnea is associated with adverse clinical consequences which an affect most organ systems. Cardiovascular disease risk includes arrhythmias, atrial fibrillation, hypertension, coronary artery disease, and stroke. Metabolic disorders include diabetes type 2, non-alcoholic fatty liver disease. Mood disorder especially depression; and cognitive decline especially in the elderly. Associations with chronic reflux/Guerrero’s esophagus some but not all inclusive. \par -Reasons include arousal consistent with hypopnea; respiratory events most prominent in REM sleep or supine position; therefore sleep staging and body position are important for accurate diagnosis and estimation of AHI. \par \par Problem 8: Poor Mechanics of Breathing\par -Recommended Wim Hof and Buteyko breathing techniques \par - Proper breathing techniques were reviewed with an emphasis of exhalation. Patient instructed to breath in for 1 second and out for four seconds. Patient was encouraged to not talk while walking. \par \par Problem 9: Overweight (improved) \par -Weight loss, exercise, and diet control were discussed and are highly encouraged. Treatment options were given such as, aqua therapy, and contacting a nutritionist. Recommended to use the elliptical, stationary bike, less use of treadmill. Mindful eating was explained to the patient Obesity is associated with worsening asthma, shortness of breath, and potential for cardiac disease, diabetes, and other underlying medical conditions. \par \par Problem 10: Cardiac\par -recommended to follow up with a cardiologist\par \par Problem 11: health maintenance\par -s/p COVID 19 vaccine Moderna x 3\par -s/p influenza vaccine 2022\par -recommended strep pneumonia vaccines after age 65: Prevnar-13 vaccine, followed by Pneumo vaccine 23 one year following\par -s/p Shingrix vaccine\par -recommended early intervention for URIs\par -recommended regular osteoporosis evaluations\par -recommended early dermatological evaluations\par -recommended after the age of 50 to the age of 70, colonoscopy every 5 years \par \par  Follow up in 6-8 weeks\par -he  is recommended to call with any changes, questions, or concerns.

## 2022-12-27 NOTE — REASON FOR VISIT
[Follow-Up] : a follow-up visit [Spouse] : spouse [TextBox_44] : via video call - severe persistent / steroid dependent / eosinophilic asthma, chronic sinus / allergies, GERD, bronchomalacia, ?PIO

## 2022-12-27 NOTE — HISTORY OF PRESENT ILLNESS
[Home] : at home, [unfilled] , at the time of the visit. [Medical Office: ([facility])___] : at the medical office located in  [Verbal consent obtained from patient] : the patient, [unfilled] [TextBox_4] : Mr. WHITE is a 62 year old male with a history of allergies, asthma, chronic cough, eosinophilic asthma, GERD, pulmonary nodules, snoring, SOB, bronchomalacia, and wheezing presenting to the office today via video call for follow up pulmonary evaluation. His chief complaint is \par \par -he notes feeling generally well \par -s/p hernia repair above belly button and recovering well\par -he notes energy levels are good \par -he notes bowels are regular \par -he denies exercising due to surgery\par -he notes vision is stable \par -he denies dysphonia \par -he notes morning sinus congestion and PND\par -he denies taking any new medications, vitamins, or supplements \par \par -he denies any headaches, nausea, vomiting, fever, chills, sweats, chest pain, chest pressure, coughing, wheezing, palpitations, constipation, diarrhea, dizziness, dysphagia, heartburn, reflux, itchy eyes, itchy ears, leg swelling, leg pain, arthralgias, myalgias, or sour taste in the mouth.

## 2022-12-27 NOTE — ADDENDUM
[FreeTextEntry1] : Documented by NARDA Villagomez acting as a scribe for Dr. Zeyad Hernandez on 12/27/2022 .\par \par All medical record entries made by the Scribe were at my, Dr. Zeyad Hernandez's, direction and personally dictated by me on 12/27/2022. I have reviewed the chart and agree that the record accurately reflects my personal performance of the history, physical exam, assessment and plan. I have also personally directed, reviewed, and agree with the discharge instructions.

## 2022-12-28 ENCOUNTER — APPOINTMENT (OUTPATIENT)
Dept: SURGERY | Facility: CLINIC | Age: 62
End: 2022-12-28

## 2022-12-28 VITALS
OXYGEN SATURATION: 94 % | WEIGHT: 171 LBS | TEMPERATURE: 97.8 F | BODY MASS INDEX: 26.84 KG/M2 | SYSTOLIC BLOOD PRESSURE: 137 MMHG | DIASTOLIC BLOOD PRESSURE: 95 MMHG | HEIGHT: 67 IN | HEART RATE: 98 BPM

## 2022-12-28 PROCEDURE — 99024 POSTOP FOLLOW-UP VISIT: CPT

## 2022-12-28 NOTE — ASSESSMENT
[FreeTextEntry1] : patient is doing well, currently without complaints.\par \par \par \par incision is c/d/i and healing well\par \par \par \par \par f/u prn

## 2023-01-12 ENCOUNTER — LABORATORY RESULT (OUTPATIENT)
Age: 63
End: 2023-01-12

## 2023-01-18 ENCOUNTER — APPOINTMENT (OUTPATIENT)
Dept: CARDIOLOGY | Facility: CLINIC | Age: 63
End: 2023-01-18
Payer: COMMERCIAL

## 2023-01-18 VITALS
RESPIRATION RATE: 16 BRPM | BODY MASS INDEX: 27.15 KG/M2 | HEART RATE: 86 BPM | TEMPERATURE: 98.1 F | OXYGEN SATURATION: 98 % | HEIGHT: 67 IN | WEIGHT: 173 LBS

## 2023-01-18 VITALS
SYSTOLIC BLOOD PRESSURE: 131 MMHG | TEMPERATURE: 97.8 F | RESPIRATION RATE: 15 BRPM | HEART RATE: 80 BPM | OXYGEN SATURATION: 99 % | BODY MASS INDEX: 27.1 KG/M2 | HEIGHT: 67 IN | DIASTOLIC BLOOD PRESSURE: 82 MMHG

## 2023-01-18 PROCEDURE — 99214 OFFICE O/P EST MOD 30 MIN: CPT

## 2023-01-18 RX ORDER — TADALAFIL 20 MG/1
20 TABLET ORAL
Qty: 12 | Refills: 3 | Status: COMPLETED | OUTPATIENT
Start: 2022-09-13 | End: 2023-01-18

## 2023-01-18 RX ORDER — TADALAFIL 5 MG/1
5 TABLET ORAL
Qty: 30 | Refills: 6 | Status: COMPLETED | OUTPATIENT
Start: 2022-09-13 | End: 2023-01-18

## 2023-01-18 NOTE — DISCUSSION/SUMMARY
[FreeTextEntry1] : This is a 62-year-old male with past medical history significant for asthma, prostate cancer status post radical prostatectomy, erectile dysfunction, status post bilateral knee surgery, GERD, who comes in for men's health cardiac prevention program follow-up evaluation.\par He denies chest pain, shortness of breath, dizziness or syncope.  \par He was born in [place] and has no history of rheumatic fever.  He does not drink excessive caffeine or alcohol.\par His cardiac risk factors include hypercholesterolemia.\par Blood work done January 12, 2023 demonstrated cholesterol 170, HDL 34, triglycerides 95, LDL calculated 117, non-HDL cholesterol 136, hemoglobin A1c of 4.8.\par The patient's LDL target is less than 100 mg/dL.\par I recommended he add Zetia 10 mg daily to his current dose of Crestor 20 mg/day.  He will have blood work done in 6 to 8 weeks on combination therapy.  Further recommendation made at that time.\par He will follow-up with his primary care physician, and neurologist.\par Echo Doppler examination done November 4, 2022 demonstrated minimal pulmonic valve regurgitation, minimal tricuspid valve regurgitation, physiologic mitral valve regurgitation, normal left ventricular ejection fraction of 63%.\par \par The patient had a normal exercise stress test November 4, 2022:\par The patient will continue his current diet and exercise program.  He will have new blood work done prior to next visit.\par The patient may require ventral hernia surgery.  He has prior abdominal surgical scars and now reports that his intestines are protruding.  He is being evaluated by surgery in the next few weeks.\par Electrocardiogram done September 23, 2022 demonstrated normal sinus rhythm rate 66 bpm is otherwise unremarkable.\par Lipid panel done September 13, 2022 demonstrated cholesterol 248, triglycerides 232, HDL of 37, LDL calculated 164 mg/dL and non-HDL cholesterol 211 mg/dL.\par The patient had been prescribed Lipitor in the past but felt that he can control his cholesterol with diet.\par The patient will start on Crestor 20 mg daily for primary prevention.\par I have also recommended he work with our registered dietitian to improve his lipid profile and caloric intake.\par He will follow-up with his urologist regarding his erectile dysfunction and follow-up with me as noted above.\par The patient understands that aerobic exercises must be increased to 40 minutes 4 times per week. A detailed discussion of lifestyle modification was done today. The patient has a good understanding of the diagnosis, and treatment plan. Lifestyle modification was also outlined.\par \par Thank you for allowing to participate in care of your patient.  I look forward to working with you again in the future.\par

## 2023-01-18 NOTE — REASON FOR VISIT
[CV Risk Factors and Non-Cardiac Disease] : CV risk factors and non-cardiac disease [Hyperlipidemia] : hyperlipidemia [FreeTextEntry3] : Dr. Mishra [FreeTextEntry1] : This is a 62 year old male patient with past medical history of asthma, GERD, prostate cancer s/p radical prostatectomy, s/p B/L knee surgery who presents today for \par \par Patient states he is generally feeling well today and he denies chest pain, SOB, dizziness, lightheadedness, headache, and recent episode of syncope. However, patient does report occasional dyspnea on exertion. \par \par As part of his workup, patient will schedule a coronary artery calcium score.

## 2023-01-19 ENCOUNTER — TRANSCRIPTION ENCOUNTER (OUTPATIENT)
Age: 63
End: 2023-01-19

## 2023-03-16 ENCOUNTER — NON-APPOINTMENT (OUTPATIENT)
Age: 63
End: 2023-03-16

## 2023-03-20 ENCOUNTER — APPOINTMENT (OUTPATIENT)
Dept: UROLOGY | Facility: CLINIC | Age: 63
End: 2023-03-20
Payer: COMMERCIAL

## 2023-03-20 PROCEDURE — 99214 OFFICE O/P EST MOD 30 MIN: CPT

## 2023-03-21 NOTE — PHYSICAL EXAM
[Skin Color & Pigmentation] : normal skin color and pigmentation [Oriented To Time, Place, And Person] : oriented to person, place, and time [Affect] : the affect was normal [Mood] : the mood was normal [Not Anxious] : not anxious [Normal Station and Gait] : the gait and station were normal for the patient's age [FreeTextEntry1] : Upon physical examination, no abnormalities appreciated. testes were non tender.

## 2023-03-21 NOTE — HISTORY OF PRESENT ILLNESS
[FreeTextEntry1] : 10/24/2022: Mr. WHITE is a 62 year old male presenting today s/p roboticassisted laparoscopic radical prostatectomy for prostate cancer pt3b with modified pelvic lymph node dissection on 7/7/22. His PSA on 10/19/2022 was <0.01 ng/mL. Pt reports. Pt denies urinary stress incontinence.has an excellent uriinary stream.  He sees Dr Moore to manage ED. He reports partial erection, but not enough tumescence for penetration. He was prescribed SIldenafil and a penile vacuum pump. He reports a discomfort in the umbilical area, which intensifies when he is standing up. He describes it as a downward pressure. On physical examination, a hernia was not appreciated on palpation.\par Assessment: [id number]; Yearly PSA surveillance \par Plan:  Pt will have a CT scan to assess hernia.PSA in one year/ He will RTO in a year\par \par 03/20/2023: Mr. WHITE is a 63 year old male presenting today s/p roboticassisted laparoscopic radical prostatectomy for prostate cancer pt3b with modified pelvic lymph node dissection on 7/7/22. His hernia has been recently repaired. He complains today of bilateral testes pain. He describes the pain as delocalized, moving from testicle to the next. he is unable to reproduce the pain upon palpation. \par Upon physical examination, no abnormalities appreciated. testes were non tender. \par \par PSA undetectable\par \par Assessment:pT3B N0Mx prostate cancer s/p prostatectomy.  Bilateral testes pain; no work up at the moment unless the pain is localized. \par \par Plan: Follow up in October 2023.

## 2023-03-21 NOTE — ADDENDUM
[FreeTextEntry1] : This note was authored by Bucky Staton working as a scribe for Dr. Ermias Cameron. I, Dr. Ermias Cameron have reviewed the content of this note and confirm it is true and accurate. I personally performed the history and physical examination and made all the decisions. 03/20/2023

## 2023-04-12 ENCOUNTER — RX RENEWAL (OUTPATIENT)
Age: 63
End: 2023-04-12

## 2023-04-18 NOTE — H&P PST ADULT - NEGATIVE CARDIOVASCULAR SYMPTOMS
Bilateral Rotation Flap Text: The defect edges were debeveled with a #15 scalpel blade. Given the location of the defect, shape of the defect and the proximity to free margins a bilateral rotation flap was deemed most appropriate. Using a sterile surgical marker, an appropriate rotation flap was drawn incorporating the defect and placing the expected incisions within the relaxed skin tension lines where possible. The area thus outlined was incised deep to adipose tissue with a #15 scalpel blade. The skin margins were undermined to an appropriate distance in all directions utilizing iris scissors. Following this, the designed flap was carried over into the primary defect and sutured into place. no chest pain/no dyspnea on exertion/no palpitations

## 2023-04-19 ENCOUNTER — TRANSCRIPTION ENCOUNTER (OUTPATIENT)
Age: 63
End: 2023-04-19

## 2023-04-27 ENCOUNTER — APPOINTMENT (OUTPATIENT)
Dept: UROLOGY | Facility: CLINIC | Age: 63
End: 2023-04-27

## 2023-04-27 ENCOUNTER — TRANSCRIPTION ENCOUNTER (OUTPATIENT)
Age: 63
End: 2023-04-27

## 2023-04-27 ENCOUNTER — APPOINTMENT (OUTPATIENT)
Dept: PULMONOLOGY | Facility: CLINIC | Age: 63
End: 2023-04-27
Payer: COMMERCIAL

## 2023-04-27 VITALS
HEIGHT: 67 IN | RESPIRATION RATE: 15 BRPM | WEIGHT: 165 LBS | DIASTOLIC BLOOD PRESSURE: 80 MMHG | HEART RATE: 84 BPM | OXYGEN SATURATION: 95 % | SYSTOLIC BLOOD PRESSURE: 118 MMHG | BODY MASS INDEX: 25.9 KG/M2 | TEMPERATURE: 97.1 F

## 2023-04-27 DIAGNOSIS — R91.8 OTHER NONSPECIFIC ABNORMAL FINDING OF LUNG FIELD: ICD-10-CM

## 2023-04-27 DIAGNOSIS — J39.8 OTHER SPECIFIED DISEASES OF UPPER RESPIRATORY TRACT: ICD-10-CM

## 2023-04-27 DIAGNOSIS — R06.02 SHORTNESS OF BREATH: ICD-10-CM

## 2023-04-27 DIAGNOSIS — K21.9 GASTRO-ESOPHAGEAL REFLUX DISEASE W/OUT ESOPHAGITIS: ICD-10-CM

## 2023-04-27 PROCEDURE — 94729 DIFFUSING CAPACITY: CPT

## 2023-04-27 PROCEDURE — 94010 BREATHING CAPACITY TEST: CPT

## 2023-04-27 PROCEDURE — 99214 OFFICE O/P EST MOD 30 MIN: CPT | Mod: 25

## 2023-04-27 PROCEDURE — 94727 GAS DIL/WSHOT DETER LNG VOL: CPT

## 2023-04-27 PROCEDURE — 95012 NITRIC OXIDE EXP GAS DETER: CPT

## 2023-04-27 RX ORDER — LEVOCETIRIZINE DIHYDROCHLORIDE 5 MG/1
5 TABLET ORAL
Qty: 1 | Refills: 1 | Status: ACTIVE | COMMUNITY
Start: 2023-04-27 | End: 1900-01-01

## 2023-04-27 NOTE — PROCEDURE
[FreeTextEntry1] : Full PFT reveals normal flows; FEV1 was 3.35 L which is 111% of predicted; normal lung volumes; normal diffusion at 23.0 , which is 109 % of predicted; normal flow volume loop.\par PFTs were performed to evaluate for SOB and Asthma \par \par FENO was ; 25  normal value being less than 25 Fractional exhaled nitric oxide (FENO) is regarded as a simple, noninvasive method for assessing eosinophilic airway inflammation. Produced by a variety of cells within the lung, nitric oxide (NO) concentrations are generally low in healthy individuals. However, high concentrations of NO appear to be involved in nonspecific host defense mechanisms and chronic inflammatory diseases such as asthma. The American Thoracic Society (ATS) therefore has recommended using FENO to aid in the diagnosis and monitoring of eosinophilic airway inflammation and asthma, and for identifying steroid responsive individuals whose chronic respiratory symptoms may be airway inflammation. \par

## 2023-04-27 NOTE — ADDENDUM
[FreeTextEntry1] : Documented by Trey Mcgregor as a scribe for Dr. Zeyad Hernandez on 04/27/2023   \par \par All medical record entries made by the Scribe were at my, Dr. Zeyad Hernandez's, direction and personally dictated by me on 04/27/2023 . I have reviewed the chart and agree that the record accurately reflects my personal performance of the history, physical exam, assessment and plan. I have also personally directed, reviewed, and agree with the discharge instructions.

## 2023-04-27 NOTE — ASSESSMENT
[FreeTextEntry1] : Mr. WHITE is a 63 year old male with a history of originally from [place], cervical facet syndrome, deviated septum, chronic sinus issues, herniated disk, GERD, allergies, HLD, eosinophilic asthma, elevated IgE, allergy, GERD, bronchomalacia who comes into the office today for pulmonary evaluation for chronic cough, wheezing, and SOB - improved (at baseline) - s/p sinus surgery (deviated septum)/ prostate surgery (cancer)/ hernia surgery- stable - S/p COVID - 19 2/2023, viral mild Allergy (4/2023)\par \par The patient's shortness of breath is multifactorial due to:\par -pulmonary disease \par      -severe persistent asthma\par      -eosinophilic asthma\par      -steroid dependent asthma\par      -Tracheomalacia\par -poor breathing mechanics \par -overweight/out of shape\par -?cardiac disease (doubt)\par \par Problem 1: Severe Persistent Asthma (improved)\par -continue Singulair 10 mg before bed \par -continue Trelegy 200 1 inhalation QD or Breo Ellipta 200 at 1 inhalation QD (whichever insurance allows0\par -continue Ventolin rescue inhaler 2 inhalations before exercise, Q6H \par -Asthma is believed to be caused by inherited (genetic) and environmental factor, but its exact cause is unknown. Asthma may be triggered by allergens, lung infections, or irritants in the air. Asthma triggers are different for each person\par -Inhaler technique reviewed as well as oral hygiene techniques reviewed with patient. Avoidance of cold air, extremes of temperature, rescue inhaler should be used before exercise. Order of medication reviewed with patient. Recommended use of a cool mist humidifier in the bedroom.\par \par Problem 2: Steroid Dependent Asthma (controlled)\par -Patient is a candidate for Dupixent. S/p Dupixent 12/2020 - move to [id number] or [id number]\par -Dupixent is a prescription medicine used with other asthma medicines for the maintenance treatment of moderate-to-severe asthma in people aged 12 years and older whose asthma is not controlled with their current asthma medicines. Dupixent helps prevent severe asthma attacks (exacerbations) and can improve your breathing. Dupixent may also help reduce the amount of oral corticosteroids you need while preventing severe asthma attacks and improving your breathing. Dupixent is not used to treat sudden breathing problems. Risks and side effect of Dupixent were discussed and reviewed with patient.\par \par Problem 3: Eosinophilic asthma\par -Patient is a candidate for Nucala or Fasenra\par -The safety and efficacy of Nucala was established in three double-blind, randomized, placebo-controlled trials in patients with severe asthma. Compared to a placebo, patients with severe asthma receiving Nucala had fewer exacerbation requiring hospitalization and/or emergency department visits, and a longer time to first exacerbation. In addition, patients with severe asthma receiving Nucala or Fasenra experienced greater reductions in their daily maintenance oral corticosteroid dose, while maintaining asthma control compared with patients receiving placebo. Treatment with Nucala did not result in a significant improvement in lung function, as measured by the volume of air exhaled by patients in one second. The most common side effects include: headache, injection site reactions, back pain, weakness, and fatigue; hypersensitivity reactions can occur within hours or days including swelling of the face, mouth, and tongue, fainting, dizziness, hives, breathing problems, and rash; herpes zoster infections have occurred. The drug is a monoclonal antibody that inhibits interleukin-5 which helps regular eosinophils, a type of white blood cell that contributes to asthma. The over-production of eosinophils can cause inflammation in the lungs, increasing the frequency of asthma attacks. Patients must also take other medications, including high dose inhaled corticosteroids and at least one additional asthma drug.\par \par Problem 4: Chronic Allergy / Sinus (Active)\par -continue Xhance 1 sniff BID - move to once per day\par -continue Olopatadine 0.6% at 1 sniff/nostril BID \par -continue Xyzal 5 mg qHS \par Environmental measures for allergies were encouraged including mattress and pillow cover, air purifier, and environmental controls. \par \par Problem 4A: Deviated Septum\par -Dr. Villa \par \par Problem 5: Bronchomalacia (quiet)\par -s/p dynamic Ct of the chest with Dr. Hernandez\par Tracheomalacia is usually acquired in adults and common causes include damage by tracheostomy or endotracheal intubation damaging the tracheal cartilage with increase risk with multiple intubations, prolonged intubation, and concurrent high dose steroid therapy; external chest wall trauma and surgery; chronic compression of the trachea by benign etiologies (eg, benign mediastinal goiter) or malignancy; relapsing polychondritis; or recurrent infection. Tracheomalacia can be asymptomatic, however signs or symptoms can develop as the severity of the airway narrowing progresses with major symptoms include dyspnea, cough, and sputum retention. Other symptoms include severe paroxysms of coughing, wheezing or stridor, barking cough and may be exacerbated by forced expiration, cough, and valsalva maneuver. Tracheomalacia is diagnosed by a bronchoscopic visualization of dynamic airway collapse on dynamic chest CT. Therapy is warranted in symptomatic patients with severe tracheomalacia and includes surgical repair as tracheobronchoplasty. The patient was referred to Dr. Zaid Hernandez or Dr. Allen Fuentes, at [facility] for a surgical consult. \par \par Problem 6: GERD\par -continue Protonix 40 mg before breakfast \par -Rule of 2s: avoid eating too much, eating too fast, eating too late, eating too spicy, eating too lousy, eating two hours before bed.\par -Things to avoid including overeating, spicy foods, tight clothing, eating within three hours of bed, this list is not all inclusive. \par -For treatment of reflux, possible options discussed including diet control, H2 blockers, PPIs, as well as coating motility agents discussed as treatment options. Timing of meals and proximity of last meal to sleep were discussed. If symptoms persist, a formal gastrointestinal evaluation is needed.\par \par Problem 7: R/o PIO (? present)\par -Recommended to complete a home sleep study due to elevated MP class, snoring, large neck size\par Sleep apnea is associated with adverse clinical consequences which an affect most organ systems. Cardiovascular disease risk includes arrhythmias, atrial fibrillation, hypertension, coronary artery disease, and stroke. Metabolic disorders include diabetes type 2, non-alcoholic fatty liver disease. Mood disorder especially depression; and cognitive decline especially in the elderly. Associations with chronic reflux/Guerrero’s esophagus some but not all inclusive. \par -Reasons include arousal consistent with hypopnea; respiratory events most prominent in REM sleep or supine position; therefore sleep staging and body position are important for accurate diagnosis and estimation of AHI. \par \par Problem 8: Poor Mechanics of Breathing\par -Recommended Wim Hof and Buteyko breathing techniques \par - Proper breathing techniques were reviewed with an emphasis of exhalation. Patient instructed to breath in for 1 second and out for four seconds. Patient was encouraged to not talk while walking. \par \par Problem 9: Overweight (improved) \par -Weight loss, exercise, and diet control were discussed and are highly encouraged. Treatment options were given such as, aqua therapy, and contacting a nutritionist. Recommended to use the elliptical, stationary bike, less use of treadmill. Mindful eating was explained to the patient Obesity is associated with worsening asthma, shortness of breath, and potential for cardiac disease, diabetes, and other underlying medical conditions. \par \par Problem 10: Cardiac\par -recommended to follow up with a cardiologist\par \par Problem 11: health maintenance\par -S/p COVID - 19 2/2023\par -s/p COVID 19 vaccine Moderna x 3\par -s/p influenza vaccine 2022\par -recommended strep pneumonia vaccines after age 65: Prevnar-13 vaccine, followed by Pneumo vaccine 23 one year following\par -s/p Shingrix vaccine\par -recommended early intervention for URIs\par -recommended regular osteoporosis evaluations\par -recommended early dermatological evaluations\par -recommended after the age of 50 to the age of 70, colonoscopy every 5 years \par \par  Follow up in 6-8 weeks\par -he  is recommended to call with any changes, questions, or concerns.

## 2023-05-08 ENCOUNTER — APPOINTMENT (OUTPATIENT)
Dept: CARDIOLOGY | Facility: CLINIC | Age: 63
End: 2023-05-08

## 2023-05-24 ENCOUNTER — LABORATORY RESULT (OUTPATIENT)
Age: 63
End: 2023-05-24

## 2023-05-30 ENCOUNTER — TRANSCRIPTION ENCOUNTER (OUTPATIENT)
Age: 63
End: 2023-05-30

## 2023-06-05 ENCOUNTER — TRANSCRIPTION ENCOUNTER (OUTPATIENT)
Age: 63
End: 2023-06-05

## 2023-06-12 ENCOUNTER — APPOINTMENT (OUTPATIENT)
Dept: UROLOGY | Facility: CLINIC | Age: 63
End: 2023-06-12
Payer: COMMERCIAL

## 2023-06-12 VITALS
TEMPERATURE: 97.8 F | DIASTOLIC BLOOD PRESSURE: 90 MMHG | HEIGHT: 67 IN | SYSTOLIC BLOOD PRESSURE: 134 MMHG | WEIGHT: 165 LBS | RESPIRATION RATE: 17 BRPM | HEART RATE: 72 BPM | BODY MASS INDEX: 25.9 KG/M2

## 2023-06-12 PROCEDURE — 99214 OFFICE O/P EST MOD 30 MIN: CPT

## 2023-06-12 NOTE — ADDENDUM
[FreeTextEntry1] : This note was authored by Bucky Staton working as a scribe for Dr. Ermias Cameron. I, Dr. Ermias Cameron have reviewed the content of this note and confirm it is true and accurate. I personally performed the history and physical examination and made all the decisions. 06/12/2023

## 2023-06-12 NOTE — REVIEW OF SYSTEMS
[Erectile Dysfunction] : erectile dysfunction [Negative] : Heme/Lymph [Incontinence] : no incontinence

## 2023-06-12 NOTE — HISTORY OF PRESENT ILLNESS
[FreeTextEntry1] : 10/24/2022: Mr. WHITE is a 62 year old male presenting today s/p roboticassisted laparoscopic radical prostatectomy for prostate cancer pt3b with modified pelvic lymph node dissection on 7/7/22. His PSA on 10/19/2022 was <0.01 ng/mL. Pt reports. Pt denies urinary stress incontinence.has an excellent uriinary stream.  He sees Dr Moore to manage ED. He reports partial erection, but not enough tumescence for penetration. He was prescribed SIldenafil and a penile vacuum pump. He reports a discomfort in the umbilical area, which intensifies when he is standing up. He describes it as a downward pressure. On physical examination, a hernia was not appreciated on palpation.\par Assessment: [id number]; Yearly PSA surveillance \par Plan:  Pt will have a CT scan to assess hernia.PSA in one year/ He will RTO in a year\par \par 03/20/2023: Mr. WHITE is a 63 year old male presenting today s/p roboticassisted laparoscopic radical prostatectomy for prostate cancer pt3b with modified pelvic lymph node dissection on 7/7/22. His hernia has been recently repaired. He complains today of bilateral testes pain. He describes the pain as delocalized, moving from testicle to the next. he is unable to reproduce the pain upon palpation. \par Upon physical examination, no abnormalities appreciated. testes were non tender. \par PSA undetectable\par ASSESSMENT:pT3B N0Mx prostate cancer s/p prostatectomy.  Bilateral testes pain; no work up at the moment unless the pain is localized. \par PLAN: Follow up in October 2023.  \par \par 06/12/2023: Mr. WHITE is a 63 year old male  s/p roboticassisted laparoscopic radical prostatectomy for prostate cancer pt3b with modified pelvic lymph node dissection on 7/7/22 presenting today for a follow up. He reports his urinary stream is good and strong, better than before the procedure. He denies stress and urgen incontinence and any other urinary difficulties. He recounts his erections were strong before surgery and now it is at 70 percent of what it was before. He notes that his erections have been improving steadily after the procedure. He uses penile injections which makes his erections even stronger (he says 100 percent). He has tried sildenafil citrate 100 mg which do not provide any improvement, unlike the penile injection. His latest PSA from an outside laboratory on 03/03/2023 was undetectable. \par \par ASSESSMENT: pT3B N0Mx prostate cancer s/p prostatectomy; undetectable PSA on March 2023, no TALIA and good erections\par \par PLAN: Follow up in one year with PSA screen before visit. \par \par I counseled the patient. I discussed the various etiologies of his symptoms. Risks and alternatives were discussed. I answered the patient questions. The patient will follow-up as directed and will contact me with any questions or concerns. Thank you for the opportunity to participate in the care of this patient. I'll keep you updated on his progress.

## 2023-06-29 ENCOUNTER — APPOINTMENT (OUTPATIENT)
Dept: UROLOGY | Facility: CLINIC | Age: 63
End: 2023-06-29
Payer: COMMERCIAL

## 2023-06-29 VITALS — DIASTOLIC BLOOD PRESSURE: 83 MMHG | SYSTOLIC BLOOD PRESSURE: 126 MMHG | HEART RATE: 107 BPM | TEMPERATURE: 97.7 F

## 2023-06-29 PROCEDURE — 99213 OFFICE O/P EST LOW 20 MIN: CPT

## 2023-06-29 NOTE — ASSESSMENT
[FreeTextEntry1] : 06/12/2023: Mr. WHITE is a 63 year old male s/p robotic_assisted laparoscopic radical prostatectomy for prostate cancer pt3b with modified pelvic lymph node dissection on 7/7/22 presenting today for a follow up. He reports his urinary stream is good and strong, better than before the procedure. He denies stress and urgen incontinence and any other urinary difficulties. He recounts his erections were strong before surgery and now it is at 70 percent of what it was before. He notes that his erections have been improving steadily after the procedure. He uses penile injections which makes his erections even stronger with minimal amount (5 units) (he says 100 percent). He has tried sildenafil citrate 100 mg which do not provide any improvement, unlike the penile injection. His latest PSA from an outside laboratory on 03/03/2023 was undetectable.

## 2023-07-12 ENCOUNTER — APPOINTMENT (OUTPATIENT)
Dept: CARDIOLOGY | Facility: CLINIC | Age: 63
End: 2023-07-12
Payer: COMMERCIAL

## 2023-07-12 ENCOUNTER — NON-APPOINTMENT (OUTPATIENT)
Age: 63
End: 2023-07-12

## 2023-07-12 VITALS
WEIGHT: 163 LBS | BODY MASS INDEX: 25.58 KG/M2 | HEART RATE: 75 BPM | OXYGEN SATURATION: 96 % | TEMPERATURE: 98.1 F | SYSTOLIC BLOOD PRESSURE: 122 MMHG | HEIGHT: 67 IN | DIASTOLIC BLOOD PRESSURE: 80 MMHG

## 2023-07-12 DIAGNOSIS — I73.9 PERIPHERAL VASCULAR DISEASE, UNSPECIFIED: ICD-10-CM

## 2023-07-12 PROCEDURE — 93922 UPR/L XTREMITY ART 2 LEVELS: CPT

## 2023-07-12 PROCEDURE — 93000 ELECTROCARDIOGRAM COMPLETE: CPT

## 2023-07-12 PROCEDURE — 99214 OFFICE O/P EST MOD 30 MIN: CPT

## 2023-07-12 NOTE — DISCUSSION/SUMMARY
[FreeTextEntry1] : This is a 63-year-old male with past medical history significant for asthma, prostate cancer status post radical prostatectomy, erectile dysfunction, status post bilateral knee surgery, GERD, who comes in for men's health cardiac prevention program follow-up evaluation.\par He denies chest pain, shortness of breath, dizziness or syncope.  He may get occasional palpitation.\par He was born in [place] and has no history of rheumatic fever.  He does not drink excessive caffeine or alcohol. His cardiac risk factors include hypercholesterolemia.\par Blood work done January 12, 2023 demonstrated cholesterol 170, HDL 34, triglycerides 95, LDL calculated 117, non-HDL cholesterol 136, hemoglobin A1c of 4.8.\par Electrocardiogram done July 12, 2023 demonstrated normal sinus rhythm at a rate of 64 and is otherwise unremarkable.\par Ankle-brachial index done July 12, 2023 was within normal limits.\par Lipid profile done May 24, 2023 demonstrated cholesterol 189, HDL of 44, triglycerides 127, LDL calculated 119, non-HDL cholesterol 145 mg/dL with an LDL direct of 126 mg/dL.\par The patient will continue on his current dose of Crestor 20 mg daily and Zetia 10 mg/day.\par He will have follow-up blood work in 4 months.\par He is still concerned about cardiovascular risk and I recommend he schedule coronary artery calcium score.  Further recommendation will made after that test is completed.\par The patient's LDL target is less than 100 mg/dL.\par  Further recommendation made at that time.\par He will follow-up with his primary care physician, and neurologist.\par Echo Doppler examination done November 4, 2022 demonstrated minimal pulmonic valve regurgitation, minimal tricuspid valve regurgitation, physiologic mitral valve regurgitation, normal left ventricular ejection fraction of 63%.\par \par The patient had a normal exercise stress test November 4, 2022:.\par The patient may require ventral hernia surgery.  He has prior abdominal surgical scars and now reports that his intestines are protruding.  He is being evaluated by surgery in the next few weeks.\par Electrocardiogram done September 23, 2022 demonstrated normal sinus rhythm rate 66 bpm is otherwise unremarkable.\par Lipid panel done September 13, 2022 demonstrated cholesterol 248, triglycerides 232, HDL of 37, LDL calculated 164 mg/dL and non-HDL cholesterol 211 mg/dL.\par The patient had been prescribed Lipitor in the past but felt that he can control his cholesterol with diet.\par The patient will start on Crestor 20 mg daily for primary prevention.\par I have also recommended he work with our registered dietitian to improve his lipid profile and caloric intake.\par He will follow-up with his urologist regarding his erectile dysfunction and follow-up with me as noted above.\par The patient understands that aerobic exercises must be increased to 40 minutes 4 times per week. A detailed discussion of lifestyle modification was done today. The patient has a good understanding of the diagnosis, and treatment plan. Lifestyle modification was also outlined.\par \par Thank you for allowing to participate in care of your patient.  I look forward to working with you again in the future.

## 2023-07-12 NOTE — REASON FOR VISIT
[CV Risk Factors and Non-Cardiac Disease] : CV risk factors and non-cardiac disease [Hyperlipidemia] : hyperlipidemia [FreeTextEntry1] : This is a 63-year-old male patient with past medical history of asthma, GERD, prostate cancer s/p radical prostatectomy, s/p B/L knee surgery who presents today for follow up cardiac evaluation. \par \par The patient is doing well today and denies any complaints. He remains on Crestor 20 mg and Zetia 10 mg daily which he is tolerating well. He reports a balanced diet and mountain bikes regularly for exercise. \par \par He may have some occasional claudication and palpitations, otherwise denies any chest pain, shortness of breath, CORONADO, dizziness, lower extremity swelling or syncope. \par \par Labs done 5/24/23: Total cholesterol 189, triglycerides 127, LDL calc 119, LDL direct 126, HDL 44\par \par Echo done 11/4/2022: LV EF 63%, trace mitral valve regurgitation, minimal tricuspid regurgitation, minimal pulmonic regurgitation.\par \par He underwent normal exercise stress test 11/4/2022\par \par He was born in [place] and has no history of rheumatic fever. He does not drink excessive caffeine or alcohol. He denies history of smoking.\par \par His cardiac risk factors include hypercholesterolemia.

## 2023-08-07 PROBLEM — I73.9 CLAUDICATION: Status: ACTIVE | Noted: 2023-07-12

## 2023-08-09 ENCOUNTER — TRANSCRIPTION ENCOUNTER (OUTPATIENT)
Age: 63
End: 2023-08-09

## 2023-09-05 ENCOUNTER — APPOINTMENT (OUTPATIENT)
Dept: PULMONOLOGY | Facility: CLINIC | Age: 63
End: 2023-09-05

## 2023-09-05 RX ORDER — EPINEPHRINE 0.3 MG/.3ML
0.3 INJECTION INTRAMUSCULAR
Qty: 1 | Refills: 0 | Status: COMPLETED | COMMUNITY
Start: 2020-12-22 | End: 2023-09-05

## 2023-09-20 ENCOUNTER — TRANSCRIPTION ENCOUNTER (OUTPATIENT)
Age: 63
End: 2023-09-20

## 2023-11-16 LAB — PSA SERPL-MCNC: 0.03 NG/ML

## 2023-12-04 ENCOUNTER — LABORATORY RESULT (OUTPATIENT)
Age: 63
End: 2023-12-04

## 2023-12-04 ENCOUNTER — APPOINTMENT (OUTPATIENT)
Dept: CARDIOLOGY | Facility: CLINIC | Age: 63
End: 2023-12-04
Payer: COMMERCIAL

## 2023-12-04 VITALS
SYSTOLIC BLOOD PRESSURE: 121 MMHG | OXYGEN SATURATION: 97 % | WEIGHT: 165 LBS | RESPIRATION RATE: 16 BRPM | DIASTOLIC BLOOD PRESSURE: 81 MMHG | HEIGHT: 67 IN | HEART RATE: 97 BPM | TEMPERATURE: 98 F | BODY MASS INDEX: 25.9 KG/M2

## 2023-12-04 PROCEDURE — 99214 OFFICE O/P EST MOD 30 MIN: CPT

## 2023-12-06 ENCOUNTER — TRANSCRIPTION ENCOUNTER (OUTPATIENT)
Age: 63
End: 2023-12-06

## 2023-12-06 ENCOUNTER — NON-APPOINTMENT (OUTPATIENT)
Age: 63
End: 2023-12-06

## 2023-12-26 ENCOUNTER — NON-APPOINTMENT (OUTPATIENT)
Age: 63
End: 2023-12-26

## 2023-12-27 ENCOUNTER — NON-APPOINTMENT (OUTPATIENT)
Age: 63
End: 2023-12-27

## 2024-01-10 ENCOUNTER — APPOINTMENT (OUTPATIENT)
Dept: UROLOGY | Facility: CLINIC | Age: 64
End: 2024-01-10
Payer: COMMERCIAL

## 2024-01-10 VITALS
HEIGHT: 67 IN | TEMPERATURE: 98.2 F | DIASTOLIC BLOOD PRESSURE: 90 MMHG | WEIGHT: 168 LBS | SYSTOLIC BLOOD PRESSURE: 132 MMHG | RESPIRATION RATE: 16 BRPM | HEART RATE: 76 BPM | BODY MASS INDEX: 26.37 KG/M2

## 2024-01-10 DIAGNOSIS — Z90.79 ACQUIRED ABSENCE OF OTHER GENITAL ORGAN(S): ICD-10-CM

## 2024-01-10 PROCEDURE — 99213 OFFICE O/P EST LOW 20 MIN: CPT

## 2024-01-11 PROBLEM — Z90.79 S/P PROSTATECTOMY: Status: ACTIVE | Noted: 2022-07-14

## 2024-01-11 NOTE — PHYSICAL EXAM
[General Appearance - Well Developed] : well developed [General Appearance - Well Nourished] : well nourished [Edema] : no peripheral edema [Exaggerated Use Of Accessory Muscles For Inspiration] : no accessory muscle use [Abdomen Soft] : soft [Abdomen Tenderness] : non-tender [Abdomen Mass (___ Cm)] : no abdominal mass palpated [Abdomen Hernia] : no hernia was discovered [Urethral Meatus] : meatus normal [Penis Abnormality] : normal circumcised penis [Urinary Bladder Findings] : the bladder was normal on palpation [Scrotum] : the scrotum was normal [Epididymis] : the epididymides were normal [Testes Tenderness] : no tenderness of the testes [Testes Mass (___cm)] : there were no testicular masses [Normal Station and Gait] : the gait and station were normal for the patient's age [] : no rash [No Focal Deficits] : no focal deficits [Oriented To Time, Place, And Person] : oriented to person, place, and time [de-identified] : some discomfort caput left epi whih is enlraged though tail WNL

## 2024-01-11 NOTE — ASSESSMENT
[FreeTextEntry1] : have seen pain like this before -  plan for Scrotal US - in office to see if ahs a cyst - may be candidate for vasectomy and leave end open to allow drainage for the pain  Office visit afterwards

## 2024-01-11 NOTE — HISTORY OF PRESENT ILLNESS
[Disease: _____________________] : Disease: [unfilled] [T: ___] : T[unfilled] [N: ___] : N[unfilled] [M: ___] : M[unfilled] [FreeTextEntry1] : patient of [place] seen for PSA elevation.  His PSA was 3.15 on July 21, 2020.  Repeat PSA on March 29 was 5.37 and a confirmatory PSA on May 2 was 5.2.    MRI of the prostate noted small prostate with 2 lesions. biopsy notes mix of grade 3 and 1 with 1 core 4. no LUTs or ED  Had RALP by [facility] 7/2 for pT3B disease  1/10/24 - Mr. Ordoñez returns today for a CC of left dull testicular pain for 3-4 weeks intermittently -  "4" on the pain scale. Was seen 6 months ago for same. Pt. states pain now increased from 6 months ago. No penile discharge. No palpable mass or veins as per patient. No pain for the last week. No pain at present.  Denies LUTs. Denies hematuria.  Still c/o ED - sees Dr. You for same. Denies constipation.

## 2024-02-10 DIAGNOSIS — N52.31 ERECTILE DYSFUNCTION FOLLOWING RADICAL PROSTATECTOMY: ICD-10-CM

## 2024-02-12 ENCOUNTER — NON-APPOINTMENT (OUTPATIENT)
Age: 64
End: 2024-02-12

## 2024-02-13 ENCOUNTER — APPOINTMENT (OUTPATIENT)
Dept: UROLOGY | Facility: CLINIC | Age: 64
End: 2024-02-13
Payer: COMMERCIAL

## 2024-02-13 PROCEDURE — 99215 OFFICE O/P EST HI 40 MIN: CPT

## 2024-02-13 NOTE — HISTORY OF PRESENT ILLNESS
[FreeTextEntry1] : The patient-doctor. relationship has been established in a face-to-face fashion on real-time video audio HIPAA compliant communication using telemedicine software. The patient was at home and the physician was in his office. The patient's identity has been confirmed.  The patient was previously emailed a copy of the telemedicine consent.  The patient has had a chance to review and has now given verbal consent and has requested care to be assessed and treated through telemedicine. The patient understands there may be limitations in this process and that they need not need further follow-up care in the office and/or hospital settings. We were unable to use the My[employer] platform and an alternative platform was utilized.  The patient was at home and I was in the office.  Verbal consent was given on Tuesday, February 13, 2024 at 9:30 AM by the patient.  It was requested by the physician.  A written consent was previously sent for the patient to sign and return.  Patient is a 63-year-old gentleman who presents with a chief complaint of erectile dysfunction. He had robotic prostatectomy in June 2022.  He states that this has been present for the past 2 years. It causes both he and his partner great stress.  I reviewed the questionnaire he completed in detail. His erections are not modified with the degree of sexual stimulation.   He states that his erections presently are often less than 5 out of 10 in both tumescence and rigidity, insufficient for penetration.   He often ejaculates through a flaccid phallus.  He has difficulty maintaining an erection. He describes a normal libido.  His sexual dysfunction occurs with both sexual relations and masturbation.  His erections are not improved with PDE5 inhibitors.  He has tried Trimix and by max in the past.  He appears to have a neurologic issue since 0.1 cc of Trimix resulted in priapism.  His partner is understanding and was unable to be with him at the visit today. He is not  and has adult children.    The patient denies fevers, chills, nausea and/or vomiting and no unexplained weight loss.  His past medical history is non-contributory.  In his present occupation he has no known toxin exposure. He does not smoke and drinks socially.  He has no known drug allergies. His review of systems and past medical history demonstrates no significant urologic issues (see patient completed questionnaire). His family history demonstrates no significant urologic issues.

## 2024-02-13 NOTE — ASSESSMENT
[FreeTextEntry1] : This pleasant gentleman presents for evaluation of his sexual dysfunction.  I have requested several blood studies and a urine analysis.  I will make further recommendations when the results return. I have suggested a diagnostic injection and duplex ultrasound to evaluate his penile vasculature. We discussed his evaluation today and possible options for therapy depending upon the results of his testing.  I will be better able to make more specific recommendations after additional test results return.  1. Review blood tests on follow up 2. Penile duplex study on follow up.  I would start with a very small dose of 0.025 cc and go up from there as necessary.  He has had priapism in the past using 0.1 cc of the Trimix.  Telehealth Consultation: 40 minutes  20 minutes reviewing his history and discussing prior results.  20 minutes discussing various treatment options, writing medication prescriptions, requests for lab testing and writing his note. There was also additional time in preparing for the visit and assisting the patient with technology issues he was having with the telehealth platform.

## 2024-02-24 PROBLEM — N52.9 MALE ERECTILE DISORDER OF ORGANIC ORIGIN: Status: ACTIVE | Noted: 2024-02-10

## 2024-02-28 LAB
ANION GAP SERPL CALC-SCNC: 10 MMOL/L
BUN SERPL-MCNC: 20 MG/DL
CALCIUM SERPL-MCNC: 9.8 MG/DL
CHLORIDE SERPL-SCNC: 105 MMOL/L
CO2 SERPL-SCNC: 26 MMOL/L
CREAT SERPL-MCNC: 1.06 MG/DL
EGFR: 79 ML/MIN/1.73M2
GLUCOSE SERPL-MCNC: 99 MG/DL
POTASSIUM SERPL-SCNC: 4.4 MMOL/L
SODIUM SERPL-SCNC: 141 MMOL/L

## 2024-02-29 ENCOUNTER — OUTPATIENT (OUTPATIENT)
Dept: OUTPATIENT SERVICES | Facility: HOSPITAL | Age: 64
LOS: 1 days | End: 2024-02-29
Payer: COMMERCIAL

## 2024-02-29 ENCOUNTER — TRANSCRIPTION ENCOUNTER (OUTPATIENT)
Age: 64
End: 2024-02-29

## 2024-02-29 ENCOUNTER — APPOINTMENT (OUTPATIENT)
Dept: UROLOGY | Facility: CLINIC | Age: 64
End: 2024-02-29
Payer: COMMERCIAL

## 2024-02-29 DIAGNOSIS — R35.0 FREQUENCY OF MICTURITION: ICD-10-CM

## 2024-02-29 DIAGNOSIS — Z98.890 OTHER SPECIFIED POSTPROCEDURAL STATES: Chronic | ICD-10-CM

## 2024-02-29 DIAGNOSIS — N52.9 MALE ERECTILE DYSFUNCTION, UNSPECIFIED: ICD-10-CM

## 2024-02-29 LAB
ALBUMIN SERPL ELPH-MCNC: 4.7 G/DL
ALP BLD-CCNC: 58 U/L
ALT SERPL-CCNC: 24 U/L
AST SERPL-CCNC: 20 U/L
BILIRUB DIRECT SERPL-MCNC: 0.3 MG/DL
BILIRUB INDIRECT SERPL-MCNC: 1.2 MG/DL
BILIRUB SERPL-MCNC: 1.6 MG/DL
CHOLEST SERPL-MCNC: 103 MG/DL
HDLC SERPL-MCNC: 40 MG/DL
LDLC SERPL CALC-MCNC: 46 MG/DL
LDLC SERPL DIRECT ASSAY-MCNC: 49 MG/DL
NONHDLC SERPL-MCNC: 63 MG/DL
PROT SERPL-MCNC: 7 G/DL
TRIGL SERPL-MCNC: 82 MG/DL

## 2024-02-29 PROCEDURE — G2211 COMPLEX E/M VISIT ADD ON: CPT

## 2024-02-29 PROCEDURE — 54235 NJX CORPORA CAVERNOSA RX AGT: CPT

## 2024-02-29 PROCEDURE — ZZZZZ: CPT

## 2024-02-29 PROCEDURE — 99214 OFFICE O/P EST MOD 30 MIN: CPT

## 2024-02-29 PROCEDURE — 93980 PENILE VASCULAR STUDY: CPT

## 2024-02-29 PROCEDURE — 93980 PENILE VASCULAR STUDY: CPT | Mod: 26

## 2024-02-29 NOTE — HISTORY OF PRESENT ILLNESS
[FreeTextEntry1] : The patient-doctor. relationship has been established in a face-to-face fashion on real-time video audio HIPAA compliant communication using telemedicine software. The patient, Oleksandr Ordoñez was at home and participated in the telephonic visit with the Physician Bijan You MD PhD who was in his medical office. The patient's identity has been confirmed.  The patient was previously emailed a copy of the telemedicine consent.  The patient has had a chance to review and has now given verbal consent and has requested care to be assessed and treated through telemedicine. The patient understands there may be limitations in this process and that they need not need further follow-up care in the office and/or hospital settings.   Verbal consent was given on Thursday, February 29, 2024 at 9 AM by the patient.  It was requested by the physician.  A written consent was previously sent for the patient to sign and return.  The patient returns for follow-up to review his recent blood studies and discuss options for therapy of his erectile dysfunction.  PMH: Patient is a 63-year-old gentleman who presents with a chief complaint of erectile dysfunction. He had robotic prostatectomy in June 2022.  He states that this has been present for the past 2 years. It causes both he and his partner great stress.  I reviewed the questionnaire he completed in detail. His erections are not modified with the degree of sexual stimulation.   He states that his erections presently are often less than 5 out of 10 in both tumescence and rigidity, insufficient for penetration.   He often ejaculates through a flaccid phallus.  He has difficulty maintaining an erection. He describes a normal libido.  His sexual dysfunction occurs with both sexual relations and masturbation.  His erections are not improved with PDE5 inhibitors.  He has tried Trimix and by max in the past.  He appears to have a neurologic issue since 0.1 cc of Trimix resulted in priapism.  His partner is understanding and was unable to be with him at the visit today. He is not  and has adult children.    The patient denies fevers, chills, nausea and/or vomiting and no unexplained weight loss.  His past medical history is non-contributory.  In his present occupation he has no known toxin exposure. He does not smoke and drinks socially.  He has no known drug allergies. His review of systems and past medical history demonstrates no significant urologic issues (see patient completed questionnaire). His family history demonstrates no significant urologic issues.

## 2024-03-01 LAB
25(OH)D3 SERPL-MCNC: 34.4 NG/ML
ALBUMIN SERPL ELPH-MCNC: 4.6 G/DL
ALP BLD-CCNC: 61 U/L
ALT SERPL-CCNC: 22 U/L
ANION GAP SERPL CALC-SCNC: 13 MMOL/L
APO A-I SERPL-MCNC: 120 MG/DL
APO A-I/APO B SERPL: 0.51 RATIO
APO B SERPL-MCNC: 62 MG/DL
APPEARANCE: CLEAR
AST SERPL-CCNC: 20 U/L
BILIRUB SERPL-MCNC: 1.6 MG/DL
BILIRUBIN URINE: NEGATIVE
BLOOD URINE: NEGATIVE
BUN SERPL-MCNC: 20 MG/DL
CALCIUM SERPL-MCNC: 9.9 MG/DL
CHLORIDE SERPL-SCNC: 105 MMOL/L
CHOLEST SERPL-MCNC: 102 MG/DL
CO2 SERPL-SCNC: 24 MMOL/L
COLOR: YELLOW
CREAT SERPL-MCNC: 1.07 MG/DL
CRP SERPL HS-MCNC: 0.84 MG/L
EGFR: 78 ML/MIN/1.73M2
ESTIMATED AVERAGE GLUCOSE: 88 MG/DL
ESTRADIOL SERPL-MCNC: 26 PG/ML
GLUCOSE QUALITATIVE U: NEGATIVE MG/DL
GLUCOSE SERPL-MCNC: 98 MG/DL
HBA1C MFR BLD HPLC: 4.7 %
HCT VFR BLD CALC: 43.8 %
HDLC SERPL-MCNC: 41 MG/DL
HGB BLD-MCNC: 14.6 G/DL
KETONES URINE: NEGATIVE MG/DL
LDLC SERPL CALC-MCNC: 44 MG/DL
LEUKOCYTE ESTERASE URINE: NEGATIVE
LH SERPL-ACNC: 4.1 IU/L
MCHC RBC-ENTMCNC: 32.2 PG
MCHC RBC-ENTMCNC: 33.3 GM/DL
MCV RBC AUTO: 96.7 FL
NITRITE URINE: NEGATIVE
NONHDLC SERPL-MCNC: 61 MG/DL
PH URINE: 6
PLATELET # BLD AUTO: 225 K/UL
POTASSIUM SERPL-SCNC: 4.6 MMOL/L
PROLACTIN SERPL-MCNC: 14.2 NG/ML
PROT SERPL-MCNC: 7.1 G/DL
PROTEIN URINE: NEGATIVE MG/DL
RBC # BLD: 4.53 M/UL
RBC # FLD: 11.8 %
SODIUM SERPL-SCNC: 142 MMOL/L
SPECIFIC GRAVITY URINE: 1.02
TRIGL SERPL-MCNC: 81 MG/DL
TSH SERPL-ACNC: 2.32 UIU/ML
UROBILINOGEN URINE: 0.2 MG/DL
WBC # FLD AUTO: 5.22 K/UL

## 2024-03-04 DIAGNOSIS — N52.9 MALE ERECTILE DYSFUNCTION, UNSPECIFIED: ICD-10-CM

## 2024-03-04 DIAGNOSIS — C61 MALIGNANT NEOPLASM OF PROSTATE: ICD-10-CM

## 2024-03-09 NOTE — PHYSICAL EXAM
[General Appearance - Well Nourished] : well nourished [General Appearance - Well Developed] : well developed [Normal Appearance] : normal appearance [Well Groomed] : well groomed [Heart Rate And Rhythm] : heart rate and rhythm were normal [General Appearance - In No Acute Distress] : no acute distress [Arterial Pulses Normal] : the pedal pulses were normal [Edema] : no peripheral edema [Respiration, Rhythm And Depth] : normal respiratory rhythm and effort [Exaggerated Use Of Accessory Muscles For Inspiration] : no accessory muscle use [Auscultation Breath Sounds / Voice Sounds] : lungs were clear to auscultation bilaterally [Chest Palpation] : palpation of the chest revealed no abnormalities [Abdomen Soft] : soft [Bowel Sounds] : normal bowel sounds [Lungs Percussion] : the lungs were normal to percussion [Abdomen Tenderness] : non-tender [Abdomen Hernia] : no hernia was discovered [Abdomen Mass (___ Cm)] : no abdominal mass palpated [Costovertebral Angle Tenderness] : no ~M costovertebral angle tenderness [Urethral Meatus] : meatus normal [Urinary Bladder Findings] : the bladder was normal on palpation [Epididymis] : the epididymides were normal [Testes Tenderness] : no tenderness of the testes [Rectal Exam - Seminal Vesicles] : the seminal vesicles were normal [Normal Station and Gait] : the gait and station were normal for the patient's age [Anus Abnormality] : the anus and perineum were normal [Testes Mass (___cm)] : there were no testicular masses [Skin Turgor] : supple [Skin Color & Pigmentation] : normal skin color and pigmentation [Skin Lesions] : no skin lesions [] : no rash [No Focal Deficits] : no focal deficits [Sensation] : the sensory exam was normal to light touch and pinprick [Oriented To Time, Place, And Person] : oriented to person, place, and time [Motor Exam] : the motor exam was normal [Affect] : the affect was normal [Mood] : the mood was normal [Not Anxious] : not anxious [Cervical Lymph Nodes Enlarged Posterior Bilaterally] : posterior cervical [No Palpable Adenopathy] : no palpable adenopathy [Cervical Lymph Nodes Enlarged Anterior Bilaterally] : anterior cervical [Axillary Lymph Nodes Enlarged Bilaterally] : axillary [Supraclavicular Lymph Nodes Enlarged Bilaterally] : supraclavicular [Femoral Lymph Nodes Enlarged Bilaterally] : femoral [Inguinal Lymph Nodes Enlarged Bilaterally] : inguinal

## 2024-03-14 ENCOUNTER — APPOINTMENT (OUTPATIENT)
Dept: UROLOGY | Facility: CLINIC | Age: 64
End: 2024-03-14
Payer: COMMERCIAL

## 2024-03-14 VITALS
DIASTOLIC BLOOD PRESSURE: 77 MMHG | WEIGHT: 167 LBS | BODY MASS INDEX: 26.21 KG/M2 | HEIGHT: 67 IN | SYSTOLIC BLOOD PRESSURE: 115 MMHG | HEART RATE: 80 BPM

## 2024-03-14 PROCEDURE — G2211 COMPLEX E/M VISIT ADD ON: CPT

## 2024-03-14 PROCEDURE — 99214 OFFICE O/P EST MOD 30 MIN: CPT

## 2024-03-14 RX ORDER — PAPAVERINE HYDROCHLORIDE 30 MG/ML
30 INJECTION, SOLUTION INTRAVENOUS
Qty: 2 | Refills: 0 | Status: ACTIVE | COMMUNITY
Start: 2024-02-13 | End: 1900-01-01

## 2024-03-14 NOTE — HISTORY OF PRESENT ILLNESS
[FreeTextEntry1] : The patient returns for follow-up of his erectile dysfunction.  PMH: Patient is a 63-year-old gentleman who presents with a chief complaint of erectile dysfunction. He had robotic prostatectomy in June 2022.  He states that this has been present for the past 2 years. It causes both he and his partner great stress.  I reviewed the questionnaire he completed in detail. His erections are not modified with the degree of sexual stimulation.   He states that his erections presently are often less than 5 out of 10 in both tumescence and rigidity, insufficient for penetration.   He often ejaculates through a flaccid phallus.  He has difficulty maintaining an erection. He describes a normal libido.  His sexual dysfunction occurs with both sexual relations and masturbation.  His erections are not improved with PDE5 inhibitors.  He has tried Trimix and by max in the past.  He appears to have a neurologic issue since 0.1 cc of Trimix resulted in priapism.  His partner is understanding and was unable to be with him at the visit today. He is not  and has adult children.    The patient denies fevers, chills, nausea and/or vomiting and no unexplained weight loss.  His past medical history is non-contributory.  In his present occupation he has no known toxin exposure. He does not smoke and drinks socially.  He has no known drug allergies. His review of systems and past medical history demonstrates no significant urologic issues (see patient completed questionnaire). His family history demonstrates no significant urologic issues.

## 2024-03-14 NOTE — ASSESSMENT
[FreeTextEntry1] : The patient returns for follow-up of his erectile dysfunction.  Penile duplex ultrasound study was performed starting at a very small dose of 0.025 cc of the Trimix.  He has had priapism in the past with dose as low as 0.1 cc.  This study demonstrated neurogenic dysfunction.  He has been injecting by next and it has been intermittently good and intermittently bad.  Patient returned for his [first] penile injection training.  He stated that he has not been able to obtain an erection sufficient for penetration with PDE5 inhibitors.  He was not interested in using a vacuum constriction device at this time. He wanted to try penile injection therapy.  We reviewed the procedure with the relative risks and benefits. A copy of the informed consent is on file. I assisted him in injecting [0.05] cc of the TriMix into the [right] corpora. At [30] minutes he had [90] % tumescence and [80] % rigidity. His erection dissipated prior to his leaving the office.  We discussed the use of a pharmacologic agent in the diagnostic evaluation of organic and in special instances psychogenic erectile dysfunction.  He was made fully aware that several medications used may not be approved by the [employer] for this purpose, although they may be well recognized and accepted by the American Urologic Association as a treatment and diagnostic tool for impotence.  He understands that there still remains a need to standardize the indications, contraindications and dosage parameters for the testing as well as treatment purposes of this procedure. He understands that the TriMix is a compounded medication and that there are other, FDA approved, injectable medications available for  use.   He received a brochure on injection therapy and I have taken particular care in reviewing carefully all potential complications of this procedure and made him  fully aware that even a death has been reported in conjunction with this therapy.  Never-the-less, weighing all risks and benefits that this procedure affords, he was willing to proceed with the use of a intracavernosal injection of the pharmacological agent prescribed and either compounded by a pharmaceutical company, pharmacist or by Dr. You as part of the diagnostic evaluation and/or use of this pharmacologic agent as a treatment for  erectile dysfunction. He was made aware that a prolonged erection (priapism) might result from penile injections and that it must be treated within four hours to prevent damage to the erectile mechanism of his penis. He acknowledged that he must notify Dr. You (or the covering doctor) and have this condition treated within fours hours should it occur. He had the opportunity to ask any questions all of which were answered to his satisfaction.   He will call with any concerns or questions. I will see him back in follow up prior to dispensing additional medication.  He will be injecting 0.05 cc of the Trimix and we will discuss the efficacy in 4 to 6 weeks.  Consultation: 35 minutes reviewing his history and performing a physical examination. Reviewing the procedure for penile injection therapy, writing for prescription medications ,discussing treatment options and writing his note. There was also additional time in preparation for today's visit.

## 2024-03-26 ENCOUNTER — OUTPATIENT (OUTPATIENT)
Dept: OUTPATIENT SERVICES | Facility: HOSPITAL | Age: 64
LOS: 1 days | End: 2024-03-26
Payer: COMMERCIAL

## 2024-03-26 ENCOUNTER — APPOINTMENT (OUTPATIENT)
Dept: UROLOGY | Facility: CLINIC | Age: 64
End: 2024-03-26
Payer: COMMERCIAL

## 2024-03-26 DIAGNOSIS — Z98.890 OTHER SPECIFIED POSTPROCEDURAL STATES: Chronic | ICD-10-CM

## 2024-03-26 DIAGNOSIS — N50.819 TESTICULAR PAIN, UNSPECIFIED: ICD-10-CM

## 2024-03-26 DIAGNOSIS — C61 MALIGNANT NEOPLASM OF PROSTATE: ICD-10-CM

## 2024-03-26 DIAGNOSIS — R35.0 FREQUENCY OF MICTURITION: ICD-10-CM

## 2024-03-26 PROCEDURE — G2211 COMPLEX E/M VISIT ADD ON: CPT

## 2024-03-26 PROCEDURE — 99212 OFFICE O/P EST SF 10 MIN: CPT

## 2024-03-26 PROCEDURE — 76870 US EXAM SCROTUM: CPT | Mod: 26

## 2024-03-26 PROCEDURE — 76870 US EXAM SCROTUM: CPT

## 2024-03-26 NOTE — HISTORY OF PRESENT ILLNESS
[FreeTextEntry1] : patient of DC seen for PSA elevation.  His PSA was 3.15 on July 21, 2020.  Repeat PSA on March 29 was 5.37 and a confirmatory PSA on May 2 was 5.2.    MRI of the prostate noted small prostate with 2 lesions. biopsy notes mix of grade 3 and 1 with 1 core 4. no LUTs or ED  Had RALP by [facility] 7/2 for pT3B disease  1/10/24 - Mr. Ordoñez returns today for a CC of left dull testicular pain for 3-4 weeks intermittently -  "4" on the pain scale. Was seen 6 months ago for same. Pt. states pain now increased from 6 months ago. No penile discharge. No palpable mass or veins as per patient. No pain for the last week. No pain at present.  Denies LUTs. Denies hematuria.  Still c/o ED - sees Dr. You for same. Denies constipation.  3/24 - testis pain better - comes and goes and not as intense. ULS today unremarkable. seeing [facility] for ED. Vpoiding fine with no incontinence

## 2024-03-28 LAB — PSA, POST - PROSTATECTOMY: 0.04 NG/ML

## 2024-04-24 ENCOUNTER — APPOINTMENT (OUTPATIENT)
Dept: PULMONOLOGY | Facility: CLINIC | Age: 64
End: 2024-04-24
Payer: COMMERCIAL

## 2024-04-24 VITALS
HEART RATE: 69 BPM | WEIGHT: 169 LBS | HEIGHT: 67 IN | OXYGEN SATURATION: 95 % | TEMPERATURE: 97.1 F | SYSTOLIC BLOOD PRESSURE: 118 MMHG | RESPIRATION RATE: 16 BRPM | BODY MASS INDEX: 26.53 KG/M2 | DIASTOLIC BLOOD PRESSURE: 80 MMHG

## 2024-04-24 DIAGNOSIS — J82.83 EOSINOPHILIC ASTHMA: ICD-10-CM

## 2024-04-24 DIAGNOSIS — J30.9 ALLERGIC RHINITIS, UNSPECIFIED: ICD-10-CM

## 2024-04-24 DIAGNOSIS — J45.909 UNSPECIFIED ASTHMA, UNCOMPLICATED: ICD-10-CM

## 2024-04-24 DIAGNOSIS — K21.9 GASTRO-ESOPHAGEAL REFLUX DISEASE W/OUT ESOPHAGITIS: ICD-10-CM

## 2024-04-24 DIAGNOSIS — J45.50 SEVERE PERSISTENT ASTHMA, UNCOMPLICATED: ICD-10-CM

## 2024-04-24 PROCEDURE — 94010 BREATHING CAPACITY TEST: CPT

## 2024-04-24 PROCEDURE — 95012 NITRIC OXIDE EXP GAS DETER: CPT

## 2024-04-24 PROCEDURE — 99214 OFFICE O/P EST MOD 30 MIN: CPT | Mod: 25

## 2024-04-24 NOTE — PROCEDURE
[FreeTextEntry1] : SPI: WNL FEV1/predicted: 91.1% PFTs were performed to evaluate for SOB and Asthma   FENO was ; 24  normal value being less than 25 Fractional exhaled nitric oxide (FENO) is regarded as a simple, noninvasive method for assessing eosinophilic airway inflammation. Produced by a variety of cells within the lung, nitric oxide (NO) concentrations are generally low in healthy individuals. However, high concentrations of NO appear to be involved in nonspecific host defense mechanisms and chronic inflammatory diseases such as asthma. The American Thoracic Society (ATS) therefore has recommended using FENO to aid in the diagnosis and monitoring of eosinophilic airway inflammation and asthma, and for identifying steroid responsive individuals whose chronic respiratory symptoms may be airway inflammation.

## 2024-04-24 NOTE — HISTORY OF PRESENT ILLNESS
[TextBox_4] : Mr. WHITE is a 63 year old male with a history of allergies, asthma, chronic cough, eosinophilic asthma, GERD, pulmonary nodules, snoring, SOB, bronchomalacia, and wheezing presenting to the office today via video call for follow up pulmonary evaluation. His chief complaint is   -he notes S/p Covid - 19 in February -he notes this month again he had a cough and PND -he notes he was having problems with insurance so for a month he went without dupixent  -he notes vision is stable  -he notes he exercises (treadmill) -he notes sleep is good  -he notes he sleeps around 6-7 hrs  -he notes energy levels are very good   -patient denies any headaches, nausea, vomiting, fever, chills, sweats, chest pain, chest pressure, palpitations, coughing, wheezing, fatigue, diarrhea, constipation, dysphagia, myalgias, dizziness, leg swelling, leg pain, itchy eyes, itchy ears, heartburn, reflux or sour taste in the mouth   TODAY'S VISIT 4/24/24 Mr. WHITE is a 64 year old male with a history of allergies, asthma, chronic cough, eosinophilic asthma, GERD, pulmonary nodules, snoring, SOB, bronchomalacia, and wheezing presenting to the office today for follow up pulmonary evaluation. His chief complaint is:  -reports he is in his normal state of health, no new concerns or recent illness -reports he is here today for medication renewal -reports he's been doing well on Dupixent   -denies pulmonary issues

## 2024-04-24 NOTE — ASSESSMENT
[FreeTextEntry1] : Mr. WHITE is a 64 year old male with a history of originally from [place], cervical facet syndrome, deviated septum, chronic sinus issues, herniated disk, GERD, allergies, HLD, eosinophilic asthma, elevated IgE, allergy, GERD, bronchomalacia who comes into the office today for a follow up visit and medication renewal. He is stable from a pulmonary standpoint  Problem 1: Severe Persistent Asthma quiet -continue Singulair 10 mg before bed  -continue Trelegy 200 1 inhalation QD or Breo Ellipta 200 at 1 inhalation QD (whichever insurance allows0 -continue Ventolin rescue inhaler 2 inhalations before exercise, Q6H  -Asthma is believed to be caused by inherited (genetic) and environmental factor, but its exact cause is unknown. Asthma may be triggered by allergens, lung infections, or irritants in the air. Asthma triggers are different for each person  Avoidance of cold air, extremes of temperature, rescue inhaler should be used before exercise. Order of medication reviewed with patient. Recommended use of a cool mist humidifier in the bedroom.  Problem 2: Steroid Dependent Asthma (controlled) -Patient is a candidate for Dupixent. S/p Dupixent 12/2020 - move to [id number] or [id number] -Dupixent is a prescription medicine used with other asthma medicines for the maintenance treatment of moderate-to-severe asthma in people aged 12 years and older whose asthma is not controlled with their current asthma medicines. Dupixent helps prevent severe asthma attacks (exacerbations) and can improve your breathing. Dupixent may also help reduce the amount of oral corticosteroids you need while preventing severe asthma attacks and improving your breathing. Dupixent is not used to treat sudden breathing problems. Risks and side effect of Dupixent were discussed and reviewed with patient.  Problem 3: Eosinophilic asthma -Patient taking Dupixent-The safety and efficacy of Nucala was established in three double-blind, randomized, placebo-controlled trials in patients with severe asthma. Compared to a placebo, patients with severe asthma receiving Nucala had fewer exacerbation requiring hospitalization and/or emergency department visits, and a longer time to first exacerbation. In addition, patients with severe asthma receiving Nucala or Fasenra experienced greater reductions in their daily maintenance oral corticosteroid dose, while maintaining asthma control compared with patients receiving placebo. Treatment with Nucala did not result in a significant improvement in lung function, as measured by the volume of air exhaled by patients in one second. The most common side effects include: headache, injection site reactions, back pain, weakness, and fatigue; hypersensitivity reactions can occur within hours or days including swelling of the face, mouth, and tongue, fainting, dizziness, hives, breathing problems, and rash; herpes zoster infections have occurred. The drug is a monoclonal antibody that inhibits interleukin-5 which helps regular eosinophils, a type of white blood cell that contributes to asthma. The over-production of eosinophils can cause inflammation in the lungs, increasing the frequency of asthma attacks. Patients must also take other medications, including high dose inhaled corticosteroids and at least one additional asthma drug.  Problem 4: Chronic Allergy / Sinus (Active) -continue Xhance 1 sniff BID - move to once per day -continue Olopatadine 0.6% at 1 sniff/nostril BID  -continue Xyzal 5 mg qHS  Environmental measures for allergies were encouraged including mattress and pillow cover, air purifier, and environmental controls.   Problem 4A: Deviated Septum -Dr. Villa   Problem 5: Bronchomalacia (quiet) -s/p dynamic Ct of the chest with Dr. Hernandez Tracheomalacia is usually acquired in adults and common causes include damage by tracheostomy or endotracheal intubation damaging the tracheal cartilage with increase risk with multiple intubations, prolonged intubation, and concurrent high dose steroid therapy; external chest wall trauma and surgery; chronic compression of the trachea by benign etiologies (eg, benign mediastinal goiter) or malignancy; relapsing polychondritis; or recurrent infection. Tracheomalacia can be asymptomatic, however signs or symptoms can develop as the severity of the airway narrowing progresses with major symptoms include dyspnea, cough, and sputum retention. Other symptoms include severe paroxysms of coughing, wheezing or stridor, barking cough and may be exacerbated by forced expiration, cough, and valsalva maneuver. Tracheomalacia is diagnosed by a bronchoscopic visualization of dynamic airway collapse on dynamic chest CT. Therapy is warranted in symptomatic patients with severe tracheomalacia and includes surgical repair as tracheobronchoplasty. The patient was referred to Dr. Zaid Hernandez or Dr. Allen Fuentes, at [facility] for a surgical consult.   Problem 6: GERD -continue Protonix 40 mg before breakfast  -Rule of 2s: avoid eating too much, eating too fast, eating too late, eating too spicy, eating too lousy, eating two hours before bed. -Things to avoid including overeating, spicy foods, tight clothing, eating within three hours of bed, this list is not all inclusive.  -For treatment of reflux, possible options discussed including diet control, H2 blockers, PPIs, as well as coating motility agents discussed as treatment options. Timing of meals and proximity of last meal to sleep were discussed. If symptoms persist, a formal gastrointestinal evaluation is needed.  Problem 7: R/o PIO (? present) -HSS pending -Recommended to complete a home sleep study due to elevated MP class, snoring, large neck size Sleep apnea is associated with adverse clinical consequences which an affect most organ systems. Cardiovascular disease risk includes arrhythmias, atrial fibrillation, hypertension, coronary artery disease, and stroke. Metabolic disorders include diabetes type 2, non-alcoholic fatty liver disease. Mood disorder especially depression; and cognitive decline especially in the elderly. Associations with chronic reflux/Guerrero's esophagus some but not all inclusive.  -Reasons include arousal consistent with hypopnea; respiratory events most prominent in REM sleep or supine position; therefore sleep staging and body position are important for accurate diagnosis and estimation of AHI.   Problem 10: Cardiac -recommended to follow up with a cardiologist  Problem 11: health maintenance -S/p COVID - 19 2/2023 -s/p COVID 19 vaccine Moderna x 3 -s/p influenza vaccine 2022 -recommended strep pneumonia vaccines after age 65: Prevnar-13 vaccine, followed by Pneumo vaccine 23 one year following -s/p Shingrix vaccine -recommended early intervention for URIs -recommended regular osteoporosis evaluations -recommended early dermatological evaluations -recommended after the age of 50 to the age of 70, colonoscopy every 5 years    Follow up in 6 months with Dr. Hernandez -he is recommended to call with any changes, questions, or concerns.

## 2024-04-24 NOTE — PHYSICAL EXAM
[No Acute Distress] : no acute distress [Well Nourished] : well nourished [Well Groomed] : well groomed [No Deformities] : no deformities [Well Developed] : well developed [Normal Oropharynx] : normal oropharynx [III] : Mallampati Class: III [Normal Appearance] : normal appearance [No Neck Mass] : no neck mass [Normal Rate/Rhythm] : normal rate/rhythm [Normal S1, S2] : normal s1, s2 [No Murmurs] : no murmurs [No Resp Distress] : no resp distress [Clear to Auscultation Bilaterally] : clear to auscultation bilaterally [No Abnormalities] : no abnormalities [Benign] : benign [Normal Gait] : normal gait [No Clubbing] : no clubbing [No Cyanosis] : no cyanosis [FROM] : FROM [Normal Color/ Pigmentation] : normal color/ pigmentation [No Focal Deficits] : no focal deficits [Oriented x3] : oriented x3 [Normal Affect] : normal affect [No Edema] : no edema [No Rubs] : no rubs [No Gallops] : no gallops [Not Tender] : not tender [Soft] : soft [Normal Bowel Sounds] : normal bowel sounds [TextBox_68] : I:E 1:3, clear

## 2024-04-24 NOTE — REASON FOR VISIT
[Follow-Up] : a follow-up visit [Asthma] : asthma [TextBox_44] : CHRISTINE WHITE is being seen for a follow-up visit for severe persistent / steroid dependent / eosinophilic asthma, chronic sinus / allergies, GERD, bronchomalacia, ?PIO.

## 2024-04-25 ENCOUNTER — RX CHANGE (OUTPATIENT)
Age: 64
End: 2024-04-25

## 2024-04-25 RX ORDER — ROSUVASTATIN CALCIUM 20 MG/1
20 TABLET, FILM COATED ORAL
Qty: 90 | Refills: 1 | Status: DISCONTINUED | COMMUNITY
Start: 2022-09-23 | End: 2024-04-25

## 2024-04-25 RX ORDER — EZETIMIBE 10 MG/1
10 TABLET ORAL DAILY
Qty: 90 | Refills: 1 | Status: DISCONTINUED | COMMUNITY
Start: 2023-01-18 | End: 2024-04-25

## 2024-04-26 RX ORDER — EZETIMIBE 10 MG/1
10 TABLET ORAL
Qty: 90 | Refills: 1 | Status: ACTIVE | COMMUNITY
Start: 1900-01-01 | End: 1900-01-01

## 2024-04-26 RX ORDER — ROSUVASTATIN CALCIUM 20 MG/1
20 TABLET, FILM COATED ORAL
Qty: 90 | Refills: 1 | Status: ACTIVE | COMMUNITY
Start: 1900-01-01 | End: 1900-01-01

## 2024-05-09 ENCOUNTER — RX RENEWAL (OUTPATIENT)
Age: 64
End: 2024-05-09

## 2024-05-21 RX ORDER — DUPILUMAB 300 MG/2ML
300 INJECTION, SOLUTION SUBCUTANEOUS
Qty: 8 | Refills: 6 | Status: ACTIVE | COMMUNITY
Start: 2021-12-23 | End: 1900-01-01

## 2024-06-05 ENCOUNTER — LABORATORY RESULT (OUTPATIENT)
Age: 64
End: 2024-06-05

## 2024-06-05 ENCOUNTER — NON-APPOINTMENT (OUTPATIENT)
Age: 64
End: 2024-06-05

## 2024-06-05 ENCOUNTER — APPOINTMENT (OUTPATIENT)
Dept: CARDIOLOGY | Facility: CLINIC | Age: 64
End: 2024-06-05
Payer: COMMERCIAL

## 2024-06-05 VITALS
SYSTOLIC BLOOD PRESSURE: 114 MMHG | OXYGEN SATURATION: 93 % | DIASTOLIC BLOOD PRESSURE: 80 MMHG | RESPIRATION RATE: 16 BRPM | TEMPERATURE: 98.1 F | WEIGHT: 169 LBS | BODY MASS INDEX: 26.53 KG/M2 | HEIGHT: 67 IN

## 2024-06-05 DIAGNOSIS — R01.1 CARDIAC MURMUR, UNSPECIFIED: ICD-10-CM

## 2024-06-05 DIAGNOSIS — E78.5 HYPERLIPIDEMIA, UNSPECIFIED: ICD-10-CM

## 2024-06-05 DIAGNOSIS — R00.2 PALPITATIONS: ICD-10-CM

## 2024-06-05 PROCEDURE — G2211 COMPLEX E/M VISIT ADD ON: CPT

## 2024-06-05 PROCEDURE — 93000 ELECTROCARDIOGRAM COMPLETE: CPT

## 2024-06-05 PROCEDURE — 99214 OFFICE O/P EST MOD 30 MIN: CPT

## 2024-06-05 NOTE — DISCUSSION/SUMMARY
[FreeTextEntry1] : This is a 64-year-old male with past medical history significant for asthma, prostate cancer status post COVID-19 infection May 2024, status post radical prostatectomy, erectile dysfunction, status post bilateral knee surgery, GERD, who comes in for men's health cardiac prevention program follow-up evaluation. He denies chest pain, shortness of breath, dizziness or syncope.  He may get occasional palpitation. He was born in [place] and has no history of rheumatic fever.  He does not drink excessive caffeine or alcohol. His cardiac risk factors include hypercholesterolemia. Electrocardiogram done June 5, 2024 demonstrated normal sinus rhythm rate of 70 bpm and is otherwise unremarkable. Lipid panel done February 28, 2024 demonstrated cholesterol 103, HDL of 40, triglycerides 82, LDL calculated 46, non-HDL cholesterol 63, LDL direct 49 mg/dL.  Lipid profile done May 24, 2023 demonstrated cholesterol 189, HDL 44, triglycerides 127, LDL direct 126 mg/dL, non-HDL cholesterol 145 mg/dL.  Blood work done January 12, 2023 demonstrated cholesterol 170, HDL 34, triglycerides 95, LDL calculated 117, non-HDL cholesterol 136, hemoglobin A1c of 4.8. Electrocardiogram done July 12, 2023 demonstrated normal sinus rhythm at a rate of 64 and is otherwise unremarkable. Ankle-brachial index done July 12, 2023 was within normal limits. Lipid profile done May 24, 2023 demonstrated cholesterol 189, HDL of 44, triglycerides 127, LDL calculated 119, non-HDL cholesterol 145 mg/dL with an LDL direct of 126 mg/dL. The patient will continue on his current dose of Crestor 20 mg daily and Zetia 10 mg/day. Echo Doppler examination done November 4, 2022 demonstrated minimal pulmonic valve regurgitation, minimal tricuspid valve regurgitation, physiologic mitral valve regurgitation, normal left ventricular ejection fraction of 63%. The patient had a normal exercise stress test November 4, 2022:. The patient may require ventral hernia surgery.  He has prior abdominal surgical scars and now reports that his intestines are protruding.  He is being evaluated by surgery in the next few weeks. Electrocardiogram done September 23, 2022 demonstrated normal sinus rhythm rate 66 bpm is otherwise unremarkable. Lipid panel done September 13, 2022 demonstrated cholesterol 248, triglycerides 232, HDL of 37, LDL calculated 164 mg/dL and non-HDL cholesterol 211 mg/dL.  I have also recommended he work with our registered dietitian to improve his lipid profile and caloric intake. He will follow-up with his urologist regarding his erectile dysfunction and follow-up with me as noted above. The patient understands that aerobic exercises must be increased to 40 minutes 4 times per week. A detailed discussion of lifestyle modification was done today. The patient has a good understanding of the diagnosis, and treatment plan. Lifestyle modification was also outlined.  Thank you for allowing to participate in care of your patient.  I look forward to working with you again in the future.

## 2024-06-05 NOTE — REASON FOR VISIT
[CV Risk Factors and Non-Cardiac Disease] : CV risk factors and non-cardiac disease [FreeTextEntry1] : This is a 64-year-old male patient with past medical history of asthma, GERD, prostate cancer s/p radical prostatectomy, s/p B/L knee surgery who presents today for follow up cardiac evaluation. The patient is doing well today and denies any complaints. denies any chest pain, shortness of breath, CORONADO, dizziness, lower extremity swelling or syncope. He remains on Crestor 20 mg and Zetia 10 mg daily.   He was born in [place] and has no history of rheumatic fever. He does not drink excessive caffeine or alcohol. He denies history of smoking.   His cardiac risk factors include hypercholesterolemia. Labs 02/29/24: Triglyceride 82, Cholesterol 103, HDL 40, LDL 46, LDL direct 49, non HDL 63  Labs done 5/24/23: Total cholesterol 189, triglycerides 127, LDL calc 119, LDL direct 126, HDL 44 Echo done 11/4/2022: LV EF 63%, trace mitral valve regurgitation, minimal tricuspid regurgitation, minimal pulmonic regurgitation. He underwent normal exercise stress test 11/4/2022

## 2024-06-10 ENCOUNTER — APPOINTMENT (OUTPATIENT)
Dept: UROLOGY | Facility: CLINIC | Age: 64
End: 2024-06-10

## 2024-06-12 ENCOUNTER — APPOINTMENT (OUTPATIENT)
Dept: UROLOGY | Facility: CLINIC | Age: 64
End: 2024-06-12

## 2024-06-25 RX ORDER — ASPIRIN 81 MG/1
81 TABLET ORAL
Refills: 0 | Status: DISCONTINUED | COMMUNITY
Start: 2023-01-30 | End: 2024-06-25

## 2024-06-25 RX ORDER — OMEGA-3/DHA/EPA/FISH OIL 300-1000MG
1000 CAPSULE ORAL
Refills: 0 | Status: DISCONTINUED | COMMUNITY
Start: 2023-01-30 | End: 2024-06-25

## 2024-06-25 RX ORDER — DUPILUMAB 300 MG/2ML
300 INJECTION, SOLUTION SUBCUTANEOUS
Qty: 2 | Refills: 6 | Status: DISCONTINUED | COMMUNITY
Start: 2020-12-14 | End: 2024-06-25

## 2024-06-25 RX ORDER — ARGININE 500 MG
500 TABLET ORAL DAILY
Qty: 1 | Refills: 6 | Status: DISCONTINUED | COMMUNITY
Start: 2022-07-21 | End: 2024-06-25

## 2024-09-03 ENCOUNTER — APPOINTMENT (OUTPATIENT)
Dept: HEPATOLOGY | Facility: CLINIC | Age: 64
End: 2024-09-03
Payer: COMMERCIAL

## 2024-09-03 DIAGNOSIS — K76.0 FATTY (CHANGE OF) LIVER, NOT ELSEWHERE CLASSIFIED: ICD-10-CM

## 2024-09-03 PROCEDURE — 76981 USE PARENCHYMA: CPT

## 2024-09-04 PROBLEM — K76.0 FATTY LIVER: Status: ACTIVE | Noted: 2024-09-04

## 2024-09-06 ENCOUNTER — TRANSCRIPTION ENCOUNTER (OUTPATIENT)
Age: 64
End: 2024-09-06

## 2024-09-14 ENCOUNTER — OUTPATIENT (OUTPATIENT)
Dept: OUTPATIENT SERVICES | Facility: HOSPITAL | Age: 64
LOS: 1 days | End: 2024-09-14
Payer: COMMERCIAL

## 2024-09-14 ENCOUNTER — APPOINTMENT (OUTPATIENT)
Dept: ULTRASOUND IMAGING | Facility: IMAGING CENTER | Age: 64
End: 2024-09-14
Payer: COMMERCIAL

## 2024-09-14 DIAGNOSIS — K76.0 FATTY (CHANGE OF) LIVER, NOT ELSEWHERE CLASSIFIED: ICD-10-CM

## 2024-09-14 DIAGNOSIS — Z98.890 OTHER SPECIFIED POSTPROCEDURAL STATES: Chronic | ICD-10-CM

## 2024-09-14 PROCEDURE — 76700 US EXAM ABDOM COMPLETE: CPT

## 2024-09-14 PROCEDURE — 76700 US EXAM ABDOM COMPLETE: CPT | Mod: 26

## 2024-09-16 NOTE — PHYSICAL EXAM
[General Appearance - Well Developed] : well developed [General Appearance - Well Nourished] : well nourished [Normal Appearance] : normal appearance [Well Groomed] : well groomed [General Appearance - In No Acute Distress] : no acute distress [Heart Rate And Rhythm] : heart rate and rhythm were normal [Edema] : no peripheral edema [Arterial Pulses Normal] : the pedal pulses were normal [Respiration, Rhythm And Depth] : normal respiratory rhythm and effort [Exaggerated Use Of Accessory Muscles For Inspiration] : no accessory muscle use [Auscultation Breath Sounds / Voice Sounds] : lungs were clear to auscultation bilaterally [Chest Palpation] : palpation of the chest revealed no abnormalities [Lungs Percussion] : the lungs were normal to percussion [Bowel Sounds] : normal bowel sounds [Abdomen Soft] : soft [Abdomen Tenderness] : non-tender [Abdomen Mass (___ Cm)] : no abdominal mass palpated [Abdomen Hernia] : no hernia was discovered [Costovertebral Angle Tenderness] : no ~M costovertebral angle tenderness [Urethral Meatus] : meatus normal [Urinary Bladder Findings] : the bladder was normal on palpation [Rectal Exam - Seminal Vesicles] : the seminal vesicles were normal [Epididymis] : the epididymides were normal [Testes Tenderness] : no tenderness of the testes [Testes Mass (___cm)] : there were no testicular masses [Anus Abnormality] : the anus and perineum were normal [Normal Station and Gait] : the gait and station were normal for the patient's age [Skin Color & Pigmentation] : normal skin color and pigmentation [Skin Turgor] : supple [] : no rash [Skin Lesions] : no skin lesions [No Focal Deficits] : no focal deficits [Sensation] : the sensory exam was normal to light touch and pinprick [Motor Exam] : the motor exam was normal [Oriented To Time, Place, And Person] : oriented to person, place, and time [Affect] : the affect was normal [Mood] : the mood was normal [Not Anxious] : not anxious [No Palpable Adenopathy] : no palpable adenopathy [Cervical Lymph Nodes Enlarged Posterior Bilaterally] : posterior cervical [Cervical Lymph Nodes Enlarged Anterior Bilaterally] : anterior cervical [Supraclavicular Lymph Nodes Enlarged Bilaterally] : supraclavicular [Axillary Lymph Nodes Enlarged Bilaterally] : axillary [Femoral Lymph Nodes Enlarged Bilaterally] : femoral [Inguinal Lymph Nodes Enlarged Bilaterally] : inguinal

## 2024-09-17 ENCOUNTER — APPOINTMENT (OUTPATIENT)
Dept: UROLOGY | Facility: CLINIC | Age: 64
End: 2024-09-17
Payer: COMMERCIAL

## 2024-09-17 VITALS
HEART RATE: 73 BPM | DIASTOLIC BLOOD PRESSURE: 89 MMHG | SYSTOLIC BLOOD PRESSURE: 130 MMHG | RESPIRATION RATE: 17 BRPM | OXYGEN SATURATION: 96 %

## 2024-09-17 PROCEDURE — G2211 COMPLEX E/M VISIT ADD ON: CPT

## 2024-09-17 PROCEDURE — 99214 OFFICE O/P EST MOD 30 MIN: CPT

## 2024-09-17 NOTE — ASSESSMENT
[FreeTextEntry1] : ]The patient returns for follow-up of his erectile dysfunction. He would like to return to [facility] since the Trimix was lasting several hours.  Penile duplex ultrasound study was performed starting at a very small dose of 0.025 cc of the Trimix.  He has had priapism in the past with dose as low as 0.1 cc.  This study demonstrated neurogenic dysfunction.  He has been injecting trimix and it has been intermittently good and intermittently bad.  He will be injecting 0.05 cc of the Bimix.I will see him in 4 months in followup.  Consultation: 35 minutes reviewing his history and discussing prior results, discussing various treatment options, writing medication prescriptions, discand writing his note. There was also additional time in preparing for the visit.

## 2024-09-17 NOTE — HISTORY OF PRESENT ILLNESS
[FreeTextEntry1] : ]The patient returns for follow-up of his erectile dysfunction. He would like to return to [facility] since the Trimix was lasting several hours.  PMH: Patient initially presented with a chief complaint of erectile dysfunction. He had robotic prostatectomy in June 2022.  He states that this has been present for the past 2 years. It causes both he and his partner great stress.  I reviewed the questionnaire he completed in detail. His erections are not modified with the degree of sexual stimulation.   He states that his erections presently are often less than 5 out of 10 in both tumescence and rigidity, insufficient for penetration.   He often ejaculates through a flaccid phallus.  He has difficulty maintaining an erection. He describes a normal libido.  His sexual dysfunction occurs with both sexual relations and masturbation.  His erections are not improved with PDE5 inhibitors.  He has tried Trimix and by max in the past.  He appears to have a neurologic issue since 0.1 cc of Trimix resulted in priapism.  His partner is understanding and was unable to be with him at the visit today. He is not  and has adult children.    The patient denies fevers, chills, nausea and/or vomiting and no unexplained weight loss.  His past medical history is non-contributory.  In his present occupation he has no known toxin exposure. He does not smoke and drinks socially.  He has no known drug allergies. His review of systems and past medical history demonstrates no significant urologic issues (see patient completed questionnaire). His family history demonstrates no significant urologic issues.

## 2024-09-19 ENCOUNTER — TRANSCRIPTION ENCOUNTER (OUTPATIENT)
Age: 64
End: 2024-09-19

## 2024-10-29 ENCOUNTER — APPOINTMENT (OUTPATIENT)
Dept: UROLOGY | Facility: CLINIC | Age: 64
End: 2024-10-29

## 2024-11-06 ENCOUNTER — APPOINTMENT (OUTPATIENT)
Dept: CARDIOLOGY | Facility: CLINIC | Age: 64
End: 2024-11-06
Payer: COMMERCIAL

## 2024-11-06 ENCOUNTER — LABORATORY RESULT (OUTPATIENT)
Age: 64
End: 2024-11-06

## 2024-11-06 ENCOUNTER — NON-APPOINTMENT (OUTPATIENT)
Age: 64
End: 2024-11-06

## 2024-11-06 VITALS
HEART RATE: 68 BPM | OXYGEN SATURATION: 96 % | BODY MASS INDEX: 27.62 KG/M2 | WEIGHT: 176 LBS | RESPIRATION RATE: 18 BRPM | HEIGHT: 67 IN | TEMPERATURE: 98.1 F

## 2024-11-06 DIAGNOSIS — R01.1 CARDIAC MURMUR, UNSPECIFIED: ICD-10-CM

## 2024-11-06 DIAGNOSIS — R00.2 PALPITATIONS: ICD-10-CM

## 2024-11-06 DIAGNOSIS — E78.5 HYPERLIPIDEMIA, UNSPECIFIED: ICD-10-CM

## 2024-11-06 PROCEDURE — G2211 COMPLEX E/M VISIT ADD ON: CPT

## 2024-11-06 PROCEDURE — 99214 OFFICE O/P EST MOD 30 MIN: CPT

## 2024-11-06 PROCEDURE — 93000 ELECTROCARDIOGRAM COMPLETE: CPT

## 2025-01-30 ENCOUNTER — APPOINTMENT (OUTPATIENT)
Dept: UROLOGY | Facility: CLINIC | Age: 65
End: 2025-01-30
Payer: COMMERCIAL

## 2025-01-30 PROCEDURE — G2211 COMPLEX E/M VISIT ADD ON: CPT | Mod: 95

## 2025-01-30 PROCEDURE — 99214 OFFICE O/P EST MOD 30 MIN: CPT | Mod: 95

## 2025-03-04 ENCOUNTER — APPOINTMENT (OUTPATIENT)
Dept: UROLOGY | Facility: CLINIC | Age: 65
End: 2025-03-04

## 2025-03-10 ENCOUNTER — APPOINTMENT (OUTPATIENT)
Dept: PULMONOLOGY | Facility: CLINIC | Age: 65
End: 2025-03-10
Payer: COMMERCIAL

## 2025-03-10 ENCOUNTER — NON-APPOINTMENT (OUTPATIENT)
Age: 65
End: 2025-03-10

## 2025-03-10 VITALS
OXYGEN SATURATION: 96 % | DIASTOLIC BLOOD PRESSURE: 72 MMHG | HEART RATE: 83 BPM | HEIGHT: 67 IN | BODY MASS INDEX: 27 KG/M2 | RESPIRATION RATE: 18 BRPM | TEMPERATURE: 96.7 F | SYSTOLIC BLOOD PRESSURE: 112 MMHG | WEIGHT: 172 LBS

## 2025-03-10 DIAGNOSIS — R06.02 SHORTNESS OF BREATH: ICD-10-CM

## 2025-03-10 DIAGNOSIS — J45.50 SEVERE PERSISTENT ASTHMA, UNCOMPLICATED: ICD-10-CM

## 2025-03-10 DIAGNOSIS — J30.9 ALLERGIC RHINITIS, UNSPECIFIED: ICD-10-CM

## 2025-03-10 DIAGNOSIS — R06.83 SNORING: ICD-10-CM

## 2025-03-10 DIAGNOSIS — K21.9 GASTRO-ESOPHAGEAL REFLUX DISEASE W/OUT ESOPHAGITIS: ICD-10-CM

## 2025-03-10 DIAGNOSIS — J82.83 EOSINOPHILIC ASTHMA: ICD-10-CM

## 2025-03-10 PROCEDURE — 94010 BREATHING CAPACITY TEST: CPT

## 2025-03-10 PROCEDURE — 95012 NITRIC OXIDE EXP GAS DETER: CPT

## 2025-03-10 PROCEDURE — 94729 DIFFUSING CAPACITY: CPT

## 2025-03-10 PROCEDURE — 99214 OFFICE O/P EST MOD 30 MIN: CPT | Mod: 25

## 2025-03-10 PROCEDURE — 94727 GAS DIL/WSHOT DETER LNG VOL: CPT

## 2025-03-10 PROCEDURE — ZZZZZ: CPT

## 2025-03-21 RX ORDER — OLOPATADINE HYDROCHLORIDE AND MOMETASONE FUROATE 25; 665 UG/1; UG/1
665-25 SPRAY, METERED NASAL
Qty: 3 | Refills: 1 | Status: DISCONTINUED | COMMUNITY
Start: 2025-03-10 | End: 2025-03-21

## 2025-03-21 RX ORDER — OLOPATADINE HYDROCHLORIDE 665 UG/1
0.6 SPRAY, METERED NASAL
Qty: 1 | Refills: 3 | Status: ACTIVE | COMMUNITY
Start: 2025-03-21 | End: 1900-01-01

## 2025-03-21 RX ORDER — MOMETASONE 50 UG/1
50 SPRAY, METERED NASAL
Qty: 1 | Refills: 1 | Status: ACTIVE | COMMUNITY
Start: 2025-03-21 | End: 1900-01-01

## 2025-03-24 ENCOUNTER — RX CHANGE (OUTPATIENT)
Age: 65
End: 2025-03-24

## 2025-05-02 ENCOUNTER — TRANSCRIPTION ENCOUNTER (OUTPATIENT)
Age: 65
End: 2025-05-02

## 2025-05-02 ENCOUNTER — RX RENEWAL (OUTPATIENT)
Age: 65
End: 2025-05-02

## 2025-05-09 DIAGNOSIS — R01.1 CARDIAC MURMUR, UNSPECIFIED: ICD-10-CM

## 2025-05-09 RX ORDER — DUPILUMAB 300 MG/2ML
300 INJECTION, SOLUTION SUBCUTANEOUS
Qty: 4 | Refills: 5 | Status: ACTIVE | COMMUNITY
Start: 2025-05-02

## 2025-05-13 ENCOUNTER — NON-APPOINTMENT (OUTPATIENT)
Age: 65
End: 2025-05-13

## 2025-05-14 ENCOUNTER — APPOINTMENT (OUTPATIENT)
Dept: DERMATOLOGY | Facility: CLINIC | Age: 65
End: 2025-05-14
Payer: COMMERCIAL

## 2025-05-14 DIAGNOSIS — D22.9 MELANOCYTIC NEVI, UNSPECIFIED: ICD-10-CM

## 2025-05-14 DIAGNOSIS — L21.9 SEBORRHEIC DERMATITIS, UNSPECIFIED: ICD-10-CM

## 2025-05-14 DIAGNOSIS — L57.0 ACTINIC KERATOSIS: ICD-10-CM

## 2025-05-14 DIAGNOSIS — L98.0 PYOGENIC GRANULOMA: ICD-10-CM

## 2025-05-14 DIAGNOSIS — Z12.83 ENCOUNTER FOR SCREENING FOR MALIGNANT NEOPLASM OF SKIN: ICD-10-CM

## 2025-05-14 DIAGNOSIS — L81.4 OTHER MELANIN HYPERPIGMENTATION: ICD-10-CM

## 2025-05-14 DIAGNOSIS — R21 RASH AND OTHER NONSPECIFIC SKIN ERUPTION: ICD-10-CM

## 2025-05-14 PROCEDURE — 99203 OFFICE O/P NEW LOW 30 MIN: CPT | Mod: 25

## 2025-05-14 PROCEDURE — 17106 DSTR CUT VSC PRLF LES<10SQCM: CPT | Mod: 59

## 2025-05-14 PROCEDURE — 17000 DESTRUCT PREMALG LESION: CPT | Mod: 59

## 2025-05-14 RX ORDER — ROFLUMILAST 3 MG/G
0.3 CREAM TOPICAL
Qty: 1 | Refills: 5 | Status: ACTIVE | COMMUNITY
Start: 2025-05-14 | End: 1900-01-01

## 2025-05-14 RX ORDER — MUPIROCIN 20 MG/G
2 OINTMENT TOPICAL
Qty: 1 | Refills: 5 | Status: ACTIVE | COMMUNITY
Start: 2025-05-14 | End: 1900-01-01

## 2025-05-16 RX ORDER — TACROLIMUS 1 MG/G
0.1 OINTMENT TOPICAL
Qty: 1 | Refills: 4 | Status: ACTIVE | COMMUNITY
Start: 2025-05-14

## 2025-06-04 ENCOUNTER — APPOINTMENT (OUTPATIENT)
Dept: CARDIOLOGY | Facility: CLINIC | Age: 65
End: 2025-06-04
Payer: COMMERCIAL

## 2025-06-04 ENCOUNTER — LABORATORY RESULT (OUTPATIENT)
Age: 65
End: 2025-06-04

## 2025-06-04 VITALS
HEIGHT: 67 IN | OXYGEN SATURATION: 96 % | DIASTOLIC BLOOD PRESSURE: 78 MMHG | WEIGHT: 176.56 LBS | BODY MASS INDEX: 27.71 KG/M2 | SYSTOLIC BLOOD PRESSURE: 121 MMHG | TEMPERATURE: 98 F | HEART RATE: 68 BPM

## 2025-06-04 DIAGNOSIS — E78.5 HYPERLIPIDEMIA, UNSPECIFIED: ICD-10-CM

## 2025-06-04 DIAGNOSIS — R06.09 OTHER FORMS OF DYSPNEA: ICD-10-CM

## 2025-06-04 DIAGNOSIS — R01.1 CARDIAC MURMUR, UNSPECIFIED: ICD-10-CM

## 2025-06-04 PROCEDURE — 93015 CV STRESS TEST SUPVJ I&R: CPT

## 2025-06-04 PROCEDURE — 99213 OFFICE O/P EST LOW 20 MIN: CPT | Mod: 25

## 2025-06-04 PROCEDURE — 93306 TTE W/DOPPLER COMPLETE: CPT

## 2025-08-13 ENCOUNTER — TRANSCRIPTION ENCOUNTER (OUTPATIENT)
Age: 65
End: 2025-08-13

## 2025-08-15 LAB — PSA, POST - PROSTATECTOMY: 0.06 NG/ML

## (undated) DEVICE — POSITIONER STRAP ARMBOARD VELCRO TS-30

## (undated) DEVICE — PACK ROBOTIC LIJ

## (undated) DEVICE — VENODYNE/SCD SLEEVE CALF MEDIUM

## (undated) DEVICE — LUBRICATING JELLY ONESHOT 1.25OZ

## (undated) DEVICE — TUBING AIRSEAL TRI-LUMEN FILTERED

## (undated) DEVICE — TROCAR COVIDIEN VERSAONE BLADELESS FIXATION 5MM STANDARD

## (undated) DEVICE — ELCTR GROUNDING PAD ADULT COVIDIEN

## (undated) DEVICE — INSUFFLATION NDL COVIDIEN SURGINEEDLE VERESS 120MM

## (undated) DEVICE — FOLEY CATH 2-WAY 16FR 5CC SILICONE

## (undated) DEVICE — SUT VICRYL 3-0 27" KS UNDYED

## (undated) DEVICE — POSITIONER FOAM HEAD CRADLE (PINK)

## (undated) DEVICE — SYR CATH TIP 2 OZ

## (undated) DEVICE — SUT VLOC 180 0 6" GS-21 GREEN

## (undated) DEVICE — Device

## (undated) DEVICE — SUT PDS II 0 36" CT-1

## (undated) DEVICE — BLADE SCALPEL SAFETYLOCK #15

## (undated) DEVICE — DRAPE LAPAROTOMY TRANSVERSE

## (undated) DEVICE — FOLEY CATH 2-WAY 20FR 30CC UNCOATED SILICONE

## (undated) DEVICE — GLV 7.5 PROTEXIS (WHITE)

## (undated) DEVICE — ENDOCATCH 10MM SPECIMEN POUCH

## (undated) DEVICE — GLV 7.5 PROTEXIS (CREAM) MICRO

## (undated) DEVICE — WARMING BLANKET UPPER ADULT

## (undated) DEVICE — XI ARM CLIP APPLIER LARGE

## (undated) DEVICE — GLV 8 PROTEXIS (CREAM) MICRO

## (undated) DEVICE — PREP BETADINE SPONGE STICKS

## (undated) DEVICE — POSITIONER PINK PAD PIGAZZI SYSTEM

## (undated) DEVICE — GOWN XL

## (undated) DEVICE — POSITIONER PURPLE ARM ONE STEP (LARGE)

## (undated) DEVICE — SUT VLOC 90 3-0 6" CV-23 VIOLET

## (undated) DEVICE — SUT VICRYL 3-0 27" SH UNDYED

## (undated) DEVICE — DRAIN RESERVOIR FOR JACKSON PRATT 100CC CARDINAL

## (undated) DEVICE — FOLEY CATH 3-WAY 20FR 5CC LATEX LUBRICATH

## (undated) DEVICE — TUBING STRYKEFLOW II SUCTION / IRRIGATOR

## (undated) DEVICE — XI DRAPE ARM

## (undated) DEVICE — TIP METZENBAUM SCISSOR MONOPOLAR ENDOCUT (ORANGE)

## (undated) DEVICE — DRAIN JACKSON PRATT 7FR ROUND END NO TROCAR

## (undated) DEVICE — XI ARM FORCEP MARYLAND BIPOLAR

## (undated) DEVICE — NDL HYPO SAFE 22G X 1.5" (BLACK)

## (undated) DEVICE — ELCTR BOVIE BLADE .75"

## (undated) DEVICE — DRSG DERMABOND 0.7ML

## (undated) DEVICE — XI OBTURATOR OPTICAL BLADELESS 8MM

## (undated) DEVICE — SUT VICRYL 4-0 27" SH UNDYED

## (undated) DEVICE — DRSG STERISTRIPS 0.5 X 4"

## (undated) DEVICE — D HELP - CLEARVIEW CLEARIFY SYSTEM

## (undated) DEVICE — SYR LUER LOK 10CC

## (undated) DEVICE — XI DRAPE COLUMN

## (undated) DEVICE — PACK MINOR WITH LAP

## (undated) DEVICE — DRAPE TOWEL BLUE 17" X 24"

## (undated) DEVICE — BLADE SURGICAL #11 CARBON

## (undated) DEVICE — TROCAR SURGIQUEST AIRSEAL 12MMX100MM

## (undated) DEVICE — SUT VICRYL 0 27" UR-6

## (undated) DEVICE — XI SEAL UNIV 5- 8 MM

## (undated) DEVICE — XI ARM FORCEP PROGRASP 8MM

## (undated) DEVICE — GOWN XXXL

## (undated) DEVICE — SUT VLOC 90 3-0 6" CV-23 UNDYED

## (undated) DEVICE — SUT POLYSORB 0 60" TIES UNDYED

## (undated) DEVICE — SUT NYLON 2-0 18" FS

## (undated) DEVICE — SOL IRR BAG NS 0.9% 3000ML

## (undated) DEVICE — XI ARM SCISSOR MONO CURVED

## (undated) DEVICE — SUT VICRYL 3-0 27" RB-1 UNDYED

## (undated) DEVICE — BAG URINE W METER 2L

## (undated) DEVICE — XI NEEDLE DRIVER LARGE

## (undated) DEVICE — DRSG TEGADERM 2.5X3"

## (undated) DEVICE — FOLEY CATH 2-WAY 20FR 5CC SILASTIC

## (undated) DEVICE — XI TIP COVER

## (undated) DEVICE — SUT VICRYL 2-0 27" SH UNDYED

## (undated) DEVICE — DRAPE IOBAN 23" X 23"

## (undated) DEVICE — SUT CAPROSYN 4-0 P-12 UNDYED

## (undated) DEVICE — PACK PERI GYN

## (undated) DEVICE — SOL IRR BAG H2O 3000ML